# Patient Record
Sex: FEMALE | Race: WHITE | NOT HISPANIC OR LATINO | Employment: UNEMPLOYED | URBAN - METROPOLITAN AREA
[De-identification: names, ages, dates, MRNs, and addresses within clinical notes are randomized per-mention and may not be internally consistent; named-entity substitution may affect disease eponyms.]

---

## 2018-01-11 ENCOUNTER — OFFICE VISIT CONVERTED (OUTPATIENT)
Dept: SURGERY | Facility: CLINIC | Age: 53
End: 2018-01-11
Attending: SURGERY

## 2019-03-18 ENCOUNTER — OFFICE VISIT CONVERTED (OUTPATIENT)
Dept: PLASTIC SURGERY | Facility: CLINIC | Age: 54
End: 2019-03-18
Attending: PLASTIC SURGERY

## 2019-03-29 ENCOUNTER — CONVERSION ENCOUNTER (OUTPATIENT)
Dept: FAMILY MEDICINE CLINIC | Facility: CLINIC | Age: 54
End: 2019-03-29

## 2019-03-29 ENCOUNTER — OFFICE VISIT CONVERTED (OUTPATIENT)
Dept: FAMILY MEDICINE CLINIC | Facility: CLINIC | Age: 54
End: 2019-03-29
Attending: NURSE PRACTITIONER

## 2019-05-23 ENCOUNTER — PROCEDURE VISIT CONVERTED (OUTPATIENT)
Dept: PLASTIC SURGERY | Facility: CLINIC | Age: 54
End: 2019-05-23
Attending: PLASTIC SURGERY

## 2019-05-31 ENCOUNTER — OFFICE VISIT CONVERTED (OUTPATIENT)
Dept: PLASTIC SURGERY | Facility: CLINIC | Age: 54
End: 2019-05-31
Attending: PLASTIC SURGERY

## 2020-01-20 ENCOUNTER — OFFICE VISIT CONVERTED (OUTPATIENT)
Dept: FAMILY MEDICINE CLINIC | Facility: CLINIC | Age: 55
End: 2020-01-20
Attending: NURSE PRACTITIONER

## 2020-01-21 ENCOUNTER — HOSPITAL ENCOUNTER (OUTPATIENT)
Dept: FAMILY MEDICINE CLINIC | Facility: CLINIC | Age: 55
Discharge: HOME | End: 2020-01-21
Attending: NURSE PRACTITIONER

## 2020-01-21 LAB
ALBUMIN SERPL-MCNC: 4.4 G/DL (ref 3.5–5)
ALBUMIN/GLOB SERPL: 1.5 {RATIO} (ref 1.4–2.6)
ALP SERPL-CCNC: 50 U/L (ref 53–141)
ALT SERPL-CCNC: 11 U/L (ref 10–40)
ANION GAP SERPL CALC-SCNC: 15 MMOL/L (ref 8–19)
AST SERPL-CCNC: 18 U/L (ref 15–50)
BASOPHILS # BLD AUTO: 0.02 10*3/UL (ref 0–0.2)
BASOPHILS NFR BLD AUTO: 0.3 % (ref 0–3)
BILIRUB SERPL-MCNC: 0.48 MG/DL (ref 0.2–1.3)
BUN SERPL-MCNC: 14 MG/DL (ref 5–25)
BUN/CREAT SERPL: 26 {RATIO} (ref 6–20)
CALCIUM SERPL-MCNC: 9.7 MG/DL (ref 8.7–10.4)
CHLORIDE SERPL-SCNC: 100 MMOL/L (ref 99–111)
CHOLEST SERPL-MCNC: 170 MG/DL (ref 107–200)
CHOLEST/HDLC SERPL: 2.9 {RATIO} (ref 3–6)
CONV ABS IMM GRAN: 0.02 10*3/UL (ref 0–0.2)
CONV CO2: 26 MMOL/L (ref 22–32)
CONV IMMATURE GRAN: 0.3 % (ref 0–1.8)
CONV TOTAL PROTEIN: 7.4 G/DL (ref 6.3–8.2)
CREAT UR-MCNC: 0.53 MG/DL (ref 0.5–0.9)
DEPRECATED RDW RBC AUTO: 43.8 FL (ref 36.4–46.3)
EOSINOPHIL # BLD AUTO: 0.1 10*3/UL (ref 0–0.7)
EOSINOPHIL # BLD AUTO: 1.7 % (ref 0–7)
ERYTHROCYTE [DISTWIDTH] IN BLOOD BY AUTOMATED COUNT: 12.8 % (ref 11.7–14.4)
GFR SERPLBLD BASED ON 1.73 SQ M-ARVRAT: >60 ML/MIN/{1.73_M2}
GLOBULIN UR ELPH-MCNC: 3 G/DL (ref 2–3.5)
GLUCOSE SERPL-MCNC: 96 MG/DL (ref 65–99)
HCT VFR BLD AUTO: 41 % (ref 37–47)
HDLC SERPL-MCNC: 58 MG/DL (ref 40–60)
HGB BLD-MCNC: 13.3 G/DL (ref 12–16)
LDLC SERPL CALC-MCNC: 96 MG/DL (ref 70–100)
LYMPHOCYTES # BLD AUTO: 1.99 10*3/UL (ref 1–5)
LYMPHOCYTES NFR BLD AUTO: 33.4 % (ref 20–45)
MCH RBC QN AUTO: 30.2 PG (ref 27–31)
MCHC RBC AUTO-ENTMCNC: 32.4 G/DL (ref 33–37)
MCV RBC AUTO: 93 FL (ref 81–99)
MONOCYTES # BLD AUTO: 0.45 10*3/UL (ref 0.2–1.2)
MONOCYTES NFR BLD AUTO: 7.6 % (ref 3–10)
NEUTROPHILS # BLD AUTO: 3.38 10*3/UL (ref 2–8)
NEUTROPHILS NFR BLD AUTO: 56.7 % (ref 30–85)
NRBC CBCN: 0 % (ref 0–0.7)
OSMOLALITY SERPL CALC.SUM OF ELEC: 284 MOSM/KG (ref 273–304)
PLATELET # BLD AUTO: 308 10*3/UL (ref 130–400)
PMV BLD AUTO: 9.7 FL (ref 9.4–12.3)
POTASSIUM SERPL-SCNC: 4 MMOL/L (ref 3.5–5.3)
RBC # BLD AUTO: 4.41 10*6/UL (ref 4.2–5.4)
SODIUM SERPL-SCNC: 137 MMOL/L (ref 135–147)
TRIGL SERPL-MCNC: 79 MG/DL (ref 40–150)
TSH SERPL-ACNC: 0.68 M[IU]/L (ref 0.27–4.2)
VLDLC SERPL-MCNC: 16 MG/DL (ref 5–37)
WBC # BLD AUTO: 5.96 10*3/UL (ref 4.8–10.8)

## 2020-01-22 LAB
CONV HEPATITIS C AB WITH REFLEX TO CONFIRMATION: <0.1 S/CO RATIO (ref 0–0.9)
CONV HEPATITIS COMMENT: NORMAL

## 2020-01-24 ENCOUNTER — HOSPITAL ENCOUNTER (OUTPATIENT)
Dept: MRI IMAGING | Facility: HOSPITAL | Age: 55
Discharge: HOME | End: 2020-01-24
Attending: NURSE PRACTITIONER

## 2020-02-03 ENCOUNTER — OFFICE VISIT CONVERTED (OUTPATIENT)
Dept: NEUROSURGERY | Facility: CLINIC | Age: 55
End: 2020-02-03
Attending: PHYSICIAN ASSISTANT

## 2020-02-20 ENCOUNTER — HOSPITAL ENCOUNTER (OUTPATIENT)
Dept: MAMMOGRAPHY | Facility: HOSPITAL | Age: 55
Discharge: HOME | End: 2020-02-20
Attending: NURSE PRACTITIONER

## 2020-05-13 ENCOUNTER — OFFICE VISIT CONVERTED (OUTPATIENT)
Dept: FAMILY MEDICINE CLINIC | Facility: CLINIC | Age: 55
End: 2020-05-13
Attending: NURSE PRACTITIONER

## 2020-06-19 ENCOUNTER — OFFICE VISIT CONVERTED (OUTPATIENT)
Dept: FAMILY MEDICINE CLINIC | Facility: CLINIC | Age: 55
End: 2020-06-19
Attending: NURSE PRACTITIONER

## 2020-08-10 ENCOUNTER — HOSPITAL ENCOUNTER (OUTPATIENT)
Dept: URGENT CARE | Facility: CLINIC | Age: 55
Discharge: HOME | End: 2020-08-10
Attending: NURSE PRACTITIONER

## 2020-08-12 LAB — SARS-COV-2 RNA SPEC QL NAA+PROBE: DETECTED

## 2020-09-01 ENCOUNTER — HOSPITAL ENCOUNTER (OUTPATIENT)
Dept: URGENT CARE | Facility: CLINIC | Age: 55
Discharge: HOME | End: 2020-09-01
Attending: NURSE PRACTITIONER

## 2020-09-01 ENCOUNTER — HOSPITAL ENCOUNTER (OUTPATIENT)
Dept: FAMILY MEDICINE CLINIC | Facility: CLINIC | Age: 55
Discharge: HOME | End: 2020-09-01
Attending: NURSE PRACTITIONER

## 2020-09-01 ENCOUNTER — OFFICE VISIT CONVERTED (OUTPATIENT)
Dept: FAMILY MEDICINE CLINIC | Facility: CLINIC | Age: 55
End: 2020-09-01
Attending: NURSE PRACTITIONER

## 2020-09-03 LAB
BACTERIA SPEC AEROBE CULT: NORMAL
SARS-COV-2 RNA SPEC QL NAA+PROBE: NOT DETECTED

## 2020-09-16 ENCOUNTER — HOSPITAL ENCOUNTER (OUTPATIENT)
Dept: ULTRASOUND IMAGING | Facility: HOSPITAL | Age: 55
Discharge: HOME | End: 2020-09-16
Attending: NURSE PRACTITIONER

## 2020-09-25 ENCOUNTER — OFFICE VISIT CONVERTED (OUTPATIENT)
Dept: OTOLARYNGOLOGY | Facility: CLINIC | Age: 55
End: 2020-09-25
Attending: NURSE PRACTITIONER

## 2020-10-02 ENCOUNTER — OFFICE VISIT CONVERTED (OUTPATIENT)
Dept: OTOLARYNGOLOGY | Facility: CLINIC | Age: 55
End: 2020-10-02
Attending: NURSE PRACTITIONER

## 2021-02-02 ENCOUNTER — OFFICE VISIT CONVERTED (OUTPATIENT)
Dept: FAMILY MEDICINE CLINIC | Facility: CLINIC | Age: 56
End: 2021-02-02
Attending: NURSE PRACTITIONER

## 2021-02-04 ENCOUNTER — HOSPITAL ENCOUNTER (OUTPATIENT)
Dept: FAMILY MEDICINE CLINIC | Facility: CLINIC | Age: 56
Discharge: HOME | End: 2021-02-04
Attending: NURSE PRACTITIONER

## 2021-02-04 LAB
ALBUMIN SERPL-MCNC: 4.7 G/DL (ref 3.5–5)
ALBUMIN/GLOB SERPL: 1.6 {RATIO} (ref 1.4–2.6)
ALP SERPL-CCNC: 70 U/L (ref 53–141)
ALT SERPL-CCNC: 24 U/L (ref 10–40)
ANION GAP SERPL CALC-SCNC: 13 MMOL/L (ref 8–19)
AST SERPL-CCNC: 27 U/L (ref 15–50)
BASOPHILS # BLD AUTO: 0.02 10*3/UL (ref 0–0.2)
BASOPHILS NFR BLD AUTO: 0.4 % (ref 0–3)
BILIRUB SERPL-MCNC: 0.24 MG/DL (ref 0.2–1.3)
BUN SERPL-MCNC: 15 MG/DL (ref 5–25)
BUN/CREAT SERPL: 22 {RATIO} (ref 6–20)
CALCIUM SERPL-MCNC: 9.8 MG/DL (ref 8.7–10.4)
CHLORIDE SERPL-SCNC: 104 MMOL/L (ref 99–111)
CHOLEST SERPL-MCNC: 192 MG/DL (ref 107–200)
CHOLEST/HDLC SERPL: 3.9 {RATIO} (ref 3–6)
CONV ABS IMM GRAN: 0.01 10*3/UL (ref 0–0.2)
CONV CO2: 26 MMOL/L (ref 22–32)
CONV IMMATURE GRAN: 0.2 % (ref 0–1.8)
CONV TOTAL PROTEIN: 7.7 G/DL (ref 6.3–8.2)
CREAT UR-MCNC: 0.67 MG/DL (ref 0.5–0.9)
DEPRECATED RDW RBC AUTO: 40.9 FL (ref 36.4–46.3)
EOSINOPHIL # BLD AUTO: 0.17 10*3/UL (ref 0–0.7)
EOSINOPHIL # BLD AUTO: 3.3 % (ref 0–7)
ERYTHROCYTE [DISTWIDTH] IN BLOOD BY AUTOMATED COUNT: 12.5 % (ref 11.7–14.4)
GFR SERPLBLD BASED ON 1.73 SQ M-ARVRAT: >60 ML/MIN/{1.73_M2}
GLOBULIN UR ELPH-MCNC: 3 G/DL (ref 2–3.5)
GLUCOSE SERPL-MCNC: 79 MG/DL (ref 65–99)
HCT VFR BLD AUTO: 43.1 % (ref 37–47)
HDLC SERPL-MCNC: 49 MG/DL (ref 40–60)
HGB BLD-MCNC: 14.2 G/DL (ref 12–16)
LDLC SERPL CALC-MCNC: 102 MG/DL (ref 70–100)
LYMPHOCYTES # BLD AUTO: 2.01 10*3/UL (ref 1–5)
LYMPHOCYTES NFR BLD AUTO: 38.4 % (ref 20–45)
MCH RBC QN AUTO: 29.3 PG (ref 27–31)
MCHC RBC AUTO-ENTMCNC: 32.9 G/DL (ref 33–37)
MCV RBC AUTO: 88.9 FL (ref 81–99)
MONOCYTES # BLD AUTO: 0.41 10*3/UL (ref 0.2–1.2)
MONOCYTES NFR BLD AUTO: 7.8 % (ref 3–10)
NEUTROPHILS # BLD AUTO: 2.61 10*3/UL (ref 2–8)
NEUTROPHILS NFR BLD AUTO: 49.9 % (ref 30–85)
NRBC CBCN: 0 % (ref 0–0.7)
OSMOLALITY SERPL CALC.SUM OF ELEC: 288 MOSM/KG (ref 273–304)
PLATELET # BLD AUTO: 337 10*3/UL (ref 130–400)
PMV BLD AUTO: 9.8 FL (ref 9.4–12.3)
POTASSIUM SERPL-SCNC: 3.8 MMOL/L (ref 3.5–5.3)
RBC # BLD AUTO: 4.85 10*6/UL (ref 4.2–5.4)
SODIUM SERPL-SCNC: 139 MMOL/L (ref 135–147)
TRIGL SERPL-MCNC: 206 MG/DL (ref 40–150)
TSH SERPL-ACNC: 0.75 M[IU]/L (ref 0.27–4.2)
VLDLC SERPL-MCNC: 41 MG/DL (ref 5–37)
WBC # BLD AUTO: 5.23 10*3/UL (ref 4.8–10.8)

## 2021-03-03 ENCOUNTER — HOSPITAL ENCOUNTER (OUTPATIENT)
Dept: MAMMOGRAPHY | Facility: HOSPITAL | Age: 56
Discharge: HOME | End: 2021-03-03
Attending: NURSE PRACTITIONER

## 2021-05-10 NOTE — H&P
History and Physical      Patient Name: Leola Montana   Patient ID: [id number]   Sex: Female   YOB: 1965    Primary Care Provider: Lena EDWARD   Referring Provider: Lena EDWARD    Visit Date: September 25, 2020    Provider: CHEYANNE Subramanian   Location: [facility]   Location Address: [address] [place] [address], [address] [place] [address]  [id number]   Location Phone: [phone]          Chief Complaint     1.  Thyroid nodules    2.  Globus sensation       History Of Present Illness  Leola Montana is a 54 year old /White female who presents to the office today as a consult from Lena EDWARD.      She presents the clinic today for evaluation of thyroid nodules.  She reports that in August she was diagnosed with COVID.  She states that during her illness she developed a loss of sense of taste and smell as well as a globus sensation that would occur whenever she would drink liquid.  She reports that she has regained approximately 20% of her taste and smell but she still occasionally has a sensation in her throat when she drinks liquids that there is something there.  She denies any throat pain, dysphagia, changes to her voice or ear pain.  She is not a smoker and has had no unintended weight loss.  She recently had an ultrasound done of her thyroid.  This showed that the overall thyroid gland was of normal size.  Identified in the right lobe was a hypoechoic solid nodule without calcifications measuring 8 mm and a second nodule with similar features measuring 5 mm.  Within the left thyroid lobe there is a hypoechoic solid nodule without calcifications measuring 8 mm.  These nodules met criteria for Ti RADS 4 classification and a repeat ultrasound in 12 months was recommended.  She reports that she had a thyroid ultrasound approximately 20 years ago that revealed some small thyroid nodules but she has had no other imaging since that time.  She has  no history of thyroid dysfunction.  There is no family history of thyroid cancer and she has no personal history of radiation exposure.  She denies any compressive symptoms or overlying skin changes.  She does report having frequent tonsil stones.  She states that she had recurrent tonsillitis as a child but never had her tonsils removed.  She is not having any issues with recurrent tonsillitis or strep throat as an adult.  She did have a sore throat while she was sick with COVID but that has since resolved.  She denies any recent issues with acid reflux or heartburn.  She states that this used to be an issue prior to having her gallbladder removed 2 years ago since that time she does not have any symptoms of GERD.       Past Medical History  Arthritis; Cervical pain (neck); Facial aging; Headache; Menopausal symptoms; Neural foraminal stenosis of cervical spine; Thyroid nodule; Varicose veins of left lower extremity with pain         Past Surgical History  Cholecystecomy; Gallbladder         Medication List  ibuprofen 600 mg oral tablet         Allergy List  NO KNOWN DRUG ALLERGIES         Family Medical History  Family history of breast cancer; Family history of prostate cancer; Family history of lung cancer         Social History  Alcohol (Current some day); Caffeine (Current - status unknown); Exercises regularly; Homemaker; lives with spouse; ; Second hand smoke exposure (Never); Tobacco (Former)         Immunizations  Name Date Admin   Influenza          Review of Systems  · Constitutional  o Denies  o : fever, night sweats, weight loss  · Eyes  o Denies  o : discharge from eye, impaired vision  · HENT  o Admits  o : *See HPI  · Cardiovascular  o Denies  o : chest pain, irregular heart beats  · Respiratory  o Denies  o : shortness of breath, wheezing, coughing up blood  · Gastrointestinal  o Denies  o : heartburn, reflux, vomiting blood  · Genitourinary  o Denies  o :  "frequency  · Integument  o Denies  o : rash, skin dryness  · Neurologic  o Denies  o : seizures, loss of balance, loss of consciousness, dizziness  · Endocrine  o Denies  o : cold intolerance, heat intolerance  · Heme-Lymph  o Denies  o : easy bleeding, anemia      Vitals  Date Time BP Position Site L\R Cuff Size HR RR TEMP (F) WT  HT  BMI kg/m2 BSA m2 O2 Sat        09/25/2020 09:55 AM        97.4 140lbs 4oz 5'  5\" 23.34 1.71           Physical Examination  · Constitutional  o Appearance  o : well developed, well-nourished, alert and in no acute distress, voice clear and strong  · Head and Face  o Head  o :   § Inspection  § : no deformities or lesions  o Face  o :   § Inspection  § : No facial lesions; House-Brackmann I/VI bilaterally  § Palpation  § : No TMJ crepitus nor  muscle tenderness bilaterally  · Eyes  o Vision  o :   § Visual Fields  § : Extraocular movements are intact. No spontaneous or gaze-induced nystagmus.  o Conjunctivae  o : clear  o Sclerae  o : clear  o Pupils and Irises  o : pupils equal, round, and reactive to light.   · Ears, Nose, Mouth and Throat  o Ears  o :   § External Ears  § : appearance within normal limits, no lesions present  § Otoscopic Examination  § : tympanic membrane appearance within normal limits bilaterally without perforations, well-aerated middle ears  § Hearing  § : intact to conversational voice both ears  o Nose  o :   § External Nose  § : appearance normal  § Intranasal Exam  § : mucosa within normal limits, vestibules normal, no intranasal lesions present, septum midline, sinuses non tender to percussion  o Oral Cavity  o :   § Oral Mucosa  § : oral mucosa normal without pallor or cyanosis  § Lips  § : lip appearance normal  § Teeth  § : normal dentition for age  § Gums  § : gums pink, non-swollen, no bleeding present  § Tongue  § : tongue appearance normal; normal mobility  § Palate  § : hard palate normal, soft palate appearance normal with symmetric " mobility  o Throat  o :   § Oropharynx  § : no inflammation or lesions present, tonsils within normal limits  · Neck  o Inspection/Palpation  o : normal appearance, no masses or tenderness, trachea midline; thyroid size normal, nontender, no nodules or masses present on palpation, no overlying skin changes  · Respiratory  o Respiratory Effort  o : breathing unlabored  o Inspection of Chest  o : normal appearance, no retractions  · Lymphatic  o Neck  o : no lymphadenopathy present  o Supraclavicular Nodes  o : no lymphadenopathy present  o Preauricular Nodes  o : no lymphadenopathy present  · Skin and Subcutaneous Tissue  o General Inspection  o : Regarding face and neck - there are no rashes present, no lesions present, and no areas of discoloration  · Neurologic  o Cranial Nerves  o : cranial nerves II-XII are grossly intact bilaterally  o Gait and Station  o : normal gait, able to stand without diffculty  · Psychiatric  o Judgement and Insight  o : judgment and insight intact  o Mood and Affect  o : mood normal, affect appropriate          Assessment  · Thyroid nodule     241.0/E04.1  · Globus sensation     306.4/R09.89    Problems Reconciled  Plan  · Orders  o Ultrasound Thyroid. (64682) - 241.0/E04.1 - 09/25/2021  · Medications  o Neilmed Sinus Rinse Complete sinus irrigation packet with rinse device   SIG: Take 1 packet with rinse device by sinus irrigation route 2 times a day   DISP: (60) Packet with rinse devices with 2 refills  Prescribed on 09/25/2020     o fluticasone propionate 50 mcg/actuation nasal spray,suspension   SIG: spray 1 - 2 sprays (50 - 100 mcg) in each nostril by intranasal route once daily for 30 days   DISP: (1) 9.9 ml aer w/adap with 2 refills  Prescribed on 09/25/2020     o Medications have been Reconciled  o Transition of Care or Provider Policy  · Instructions  o She presents the clinic today for evaluation of thyroid nodules. She reports that in August she was diagnosed with COVID. She  states that during her illness she developed a loss of sense of taste and smell as well as a globus sensation that would occur whenever she would drink liquid. She reports that she has regained approximately 20% of her taste and smell but she still occasionally has a sensation in her throat when she drinks liquids that there is something there. She denies any throat pain, dysphagia, changes to her voice or ear pain. She is not a smoker and has had no unintended weight loss. She recently had an ultrasound done of her thyroid. This showed that the overall thyroid gland was of normal size. Identified in the right lobe was a hypoechoic solid nodule without calcifications measuring 8 mm and a second nodule with similar features measuring 5 mm. Within the left thyroid lobe there is a hypoechoic solid nodule without calcifications measuring 8 mm. These nodules met criteria for Ti RADS 4 classification and a repeat ultrasound in 12 months was recommended. She reports that she had a thyroid ultrasound approximately 20 years ago that revealed some small thyroid nodules but she has had no other imaging since that time. She has no history of thyroid dysfunction. There is no family history of thyroid cancer and she has no personal history of radiation exposure. She denies any compressive symptoms or overlying skin changes. She does report having frequent tonsil stones. She states that she had recurrent tonsillitis as a child but never had her tonsils removed. She is not having any issues with recurrent tonsillitis or strep throat as an adult. She did have a sore throat while she was sick with COVID but that has since resolved. She denies any recent issues with acid reflux or heartburn. She states that this used to be an issue prior to having her gallbladder removed 2 years ago since that time she does not have any symptoms of GERD. On examination today I do not see any tonsil stones present. Her tonsils are of normal size, 2+  and cryptic but not currently infected. Her thyroid is of normal size and I am not able to palpate any thyroid nodules. There is no cervical lymphadenopathy and no overlying skin changes. We discussed reasons for globus sensation including allergy related postnasal drainage and acid reflux symptoms. We have discussed performing a flexible nasal pharyngoscopy but at this time she defers. I will have her start doing baking soda and warm water gargles for tonsil stones and also using a nasal saline sinus rinse kit to help clean out her nose. She reports that she has previously used Flonase and would like a refill on this so I will have her use the Flonase after she does the nasal wash. In regards to her thyroid nodule we will schedule a repeat thyroid ultrasound in 12 months and I will plan to see her back after the scan.  o Electronically Identified Patient Education Materials Provided Electronically  · Correspondence  o ENT Letter to Referring MD (Lena EDWARD) - 09/25/2020            Electronically Signed by: CHEYANNE Subramanian -Author on September 25, 2020 10:42:23 AM

## 2021-05-12 ENCOUNTER — CONVERSION ENCOUNTER (OUTPATIENT)
Dept: FAMILY MEDICINE CLINIC | Facility: CLINIC | Age: 56
End: 2021-05-12

## 2021-05-12 ENCOUNTER — OFFICE VISIT CONVERTED (OUTPATIENT)
Dept: FAMILY MEDICINE CLINIC | Facility: CLINIC | Age: 56
End: 2021-05-12
Attending: NURSE PRACTITIONER

## 2021-05-13 NOTE — PROGRESS NOTES
Progress Note      Patient Name: Leola Montana   Patient ID: [id number]   Sex: Female   YOB: 1965    Primary Care Provider: Lena EDWARD   Referring Provider: Lena EDWARD    Visit Date: September 1, 2020    Provider: CHEYANNE Wray   Location: [facility]   Location Address: [address] [place] [address], [address] [place] [address]  [id number]   Location Phone: [phone]          Chief Complaint  · feels like something is in throat when she is drinking beverages      History Of Present Illness  Leola Montana is a 54 year old /White female who presents for evaluation and treatment of:      She states that she was seen at [facility] on 8/10/2020 due to a loss of taste and smell.  She was treated for sinus infection with Augmentin but she was also tested for COVID-19.  She states she was positive for COVID-19 and she was told to quarantine for 14 days which she did.  She states that the health department told her she did not need to quarantine anymore after that.  She is still complaining of loss of taste and smell.  She is also complaining now that she feels like something is stuck in her throat only when she drinks liquids and not when she eats.  She denies sore throat, denies fever or chills.       Past Medical History  Disease Name Date Onset Notes   Arthritis --  --    Cervical pain (neck) 01/20/2020 --    Facial aging --  --    Headache 01/20/2020 --    Menopausal symptoms 05/13/2020 --    Neural foraminal stenosis of cervical spine 01/20/2020 --    Varicose veins of left lower extremity with pain 06/19/2020 --          Past Surgical History  Procedure Name Date Notes   Cholecystecomy --  --    Gallbladder 2018 --          Medication List  Name Date Started Instructions   ibuprofen 600 mg oral tablet 05/13/2020 take 1 tablet (600 mg) by oral route every 6 to 8 hours as needed with food   Voltaren 1 % topical gel 05/13/2020 apply 4 grams to the  "affected area(s) by topical route 4 times per day for 90 days         Allergy List  Allergen Name Date Reaction Notes   NO KNOWN DRUG ALLERGIES --  --  --          Family Medical History  Disease Name Relative/Age Notes   Lung Neoplasm, Malignant  --    Prostate cancer Father/   Father   Family history of breast cancer  Aunt         Social History  Finding Status Start/Stop Quantity Notes   Alcohol Current some day --/-- --  occasionally drinks  drinks rarely   Caffeine Current - status unknown --/-- --  drinks coffee; 1-2 times per day   Exercises regularly --  --/-- --  --    Homemaker --  --/-- --  --    lives with spouse --  --/-- --  --     --  --/-- --  --    Second hand smoke exposure Never --/-- --  no   Tobacco Former --/-- --  former smoker, smoked 5 years         Immunizations  NameDate Admin Mfg Trade Name Lot Number Route Inj VIS Given VIS Publication   [id number]/01/2019 [facility] [employer]  NE NE 01/20/2020    Comments: at [facility] per pt information         Review of Systems  · Constitutional  o Denies  o : fatigue, fever, chills, body aches, night sweats  · HENT  o Denies  o : headaches, sinus pain, nasal congestion, nasal discharge, sore throat, ear pain, ear fullness  · Cardiovascular  o Denies  o : lower extremity edema, claudication, chest pressure, palpitations  · Respiratory  o Denies  o : shortness of breath, wheezing, cough, hemoptysis, dyspnea on exertion  · Gastrointestinal  o Denies  o : nausea, vomiting, diarrhea, constipation, abdominal pain  · Integument  o Denies  o : rash, itching      Vitals  Date Time BP Position Site L\R Cuff Size HR RR TEMP (F) WT  HT  BMI kg/m2 BSA m2 O2 Sat        09/01/2020 09:12 AM 92/62 Sitting    69 - R  97.1 142lbs 4oz 5'  5\" 23.67 1.72 97 %          Physical Examination  · Constitutional  o Appearance  o : no acute distress, well-nourished  · Head and Face  o Head  o :   § Inspection  § : atraumatic, normocephalic  · Ears, Nose, Mouth and " Throat  o Ears  o :   § External Ears  § : normal  § Otoscopic Examination  § : tympanic membrane appearance within normal limits bilaterally  o Oral Cavity  o :   § Oral Mucosa  § : moist mucous membranes  o Throat  o :   § Oropharynx  § : mild oropharynx inflammation present-bilateral   · Neck  o Thyroid  o : mild thyromegaly present, nontender, no nodules or masses present on palpation, symmetric  · Respiratory  o Respiratory Effort  o : breathing comfortably, symmetric chest rise  o Auscultation of Lungs  o : clear to asculatation bilaterally, no wheezes, rales, or rhonchii  · Cardiovascular  o Heart  o :   § Auscultation of Heart  § : regular rate and rhythm, no murmurs, rubs, or gallops  o Peripheral Vascular System  o :   § Extremities  § : no edema  · Lymphatic  o Neck  o : no lymphadenopathy present  · Neurologic  o Mental Status Examination  o :   § Orientation  § : grossly oriented to person, place and time  o Gait and Station  o :   § Gait Screening  § : normal gait  · Psychiatric  o General  o : normal mood and affect          Results  · In-Office Procedures  o Lab procedure  § IOP - Rapid Strep (09019)   § Beta Strep Gp A Culture: Negative   § Internal Control Verified?: Yes       Assessment  · COVID-19 (History of 8/10/20)       COVID-19     519.8/U07.1  · Loss of taste     781.1/R43.2  · Loss of smell     781.1/R43.0  · Enlarged thyroid     240.9/E04.9  · Difficulty swallowing liquids     787.20/R13.10  · Throat symptom     784.99/R68.89    Problems Reconciled  Plan  · Orders  o ACO-39: Current medications updated and reviewed () - - 09/01/2020  o [facility] NCOV2 (send-out) (69570) - 519.8/U07.1, 781.1/R43.2, 781.1/R43.0 - 09/01/2020  o Thyroid Ultrasound. (71569) - 240.9/E04.9, 519.8/U07.1, 787.20/R13.10 - 09/01/2020   History of COVID19 on 8/10/20, pt is being sent for retesting, schedule a few weeks out  o Throat culture and sensitivity (62826) - 784.99/R68.89, 787.20/R13.10 -  09/01/2020  · Medications  o Medications have been Reconciled  o Transition of Care or Provider Policy  · Instructions  o Handouts were given to patient: COVID-19 care packet  o Rest. Increase Fluids.  o Patient was educated/instructed on their diagnosis, treatment and medications prior to discharge from the clinic today.  o Patient instructed to seek medical attention urgently for new or worsening symptoms.  o Call the office with any concerns or questions.  o Discussed Covid-19 precautions including, but not limited to, social distancing, avoid touching your face, and hand washing.   · Disposition  o Call or Return if symptoms worsen or persist.     Strep swab in office negative, will send for confirmation due to redness in her throat and symptoms.  We will call patient only get appointment set up for her to get COVID-19 testing today.    Discussed we will need to quarantine until a negative COVID-19 and symptoms have resolved.  COVID-19 care packet given to patient and discussed.  Patient instructed not to go anywhere except to seek medical care.  Instructed to seek medical care for any difficulty of breathing, unable to keep fluids down, or worsening of symptoms.             Electronically Signed by: CHEYANNE Wray -Author on September 1, 2020 09:54:33 AM

## 2021-05-13 NOTE — PROGRESS NOTES
Progress Note      Patient Name: Leola Montana   Patient ID: [id number]   Sex: Female   YOB: 1965    Primary Care Provider: Lena EDWARD   Referring Provider: Lena EDWARD    Visit Date: October 2, 2020    Provider: CHEYANNE Subramanian   Location: [facility]   Location Address: [address] [place] [address], [address]  [id number]   Location Phone: [phone]          Chief Complaint     1.  Globus sensation       History Of Present Illness  Leola Montana is a 54 year old /White female who presents to the office today for a follow-up visit.      She presents to the clinic today for follow-up regarding globus sensation.  She reports that she continues to have a globus sensation that she feels on the right side whenever she swallows liquids.  She states that this does not occur when swallowing solid foods.  She will occasionally feel it whenever she speaks.  She states that this has been occurring since August when she tested positive for COVID.  She states since having COVID that she still has not fully regained her sense of smell or taste.  She denies any dysphagia, choking, painful swallowing or changes in her voice or hoarseness.  She is not a smoker or heavy drinker.  Her weight has been stable and overall she feels well.  She is quite worried by the sensation in her throat and is requesting a flexible nasopharyngoscopy today.       Review of Systems  · Constitutional  o Denies  o : fever, night sweats, weight loss  · Eyes  o Denies  o : discharge from eye, impaired vision  · HENT  o Admits  o : *See HPI  · Cardiovascular  o Denies  o : chest pain, irregular heart beats  · Respiratory  o Denies  o : shortness of breath, wheezing, coughing up blood  · Gastrointestinal  o Denies  o : heartburn, reflux, vomiting blood  · Genitourinary  o Denies  o : frequency  · Integument  o Denies  o : rash, skin dryness  · Neurologic  o Denies  o : seizures,  "loss of balance, loss of consciousness, dizziness  · Endocrine  o Denies  o : cold intolerance, heat intolerance  · Heme-Lymph  o Denies  o : easy bleeding, anemia      Vitals  Date Time BP Position Site L\R Cuff Size HR RR TEMP (F) WT  HT  BMI kg/m2 BSA m2 O2 Sat FR L/min FiO2 HC       10/02/2020 09:41 AM        98.1 142lbs 8oz 5'  5\" 23.71 1.72             Physical Examination  · Constitutional  o Appearance  o : well developed, well-nourished, alert and in no acute distress, voice clear and strong  · Head and Face  o Head  o :   § Inspection  § : no deformities or lesions  o Face  o :   § Inspection  § : No facial lesions; House-Brackmann I/VI bilaterally  § Palpation  § : No TMJ crepitus nor  muscle tenderness bilaterally  · Eyes  o Vision  o :   § Visual Fields  § : Extraocular movements are intact. No spontaneous or gaze-induced nystagmus.  o Conjunctivae  o : clear  o Sclerae  o : clear  o Pupils and Irises  o : pupils equal, round, and reactive to light.   · Ears, Nose, Mouth and Throat  o Ears  o :   § External Ears  § : appearance within normal limits, no lesions present  § Otoscopic Examination  § : tympanic membrane appearance within normal limits bilaterally without perforations, well-aerated middle ears  § Hearing  § : intact to conversational voice both ears  o Nose  o :   § External Nose  § : appearance normal  § Intranasal Exam  § : mucosa within normal limits, vestibules normal, no intranasal lesions present, septum midline, sinuses non tender to percussion  o Oral Cavity  o :   § Oral Mucosa  § : oral mucosa normal without pallor or cyanosis  § Lips  § : lip appearance normal  § Teeth  § : normal dentition for age  § Gums  § : gums pink, non-swollen, no bleeding present  § Tongue  § : tongue appearance normal; normal mobility  § Palate  § : hard palate normal, soft palate appearance normal with symmetric mobility  o Throat  o :   § Oropharynx  § : no inflammation or lesions present, " tonsils within normal limits  § Hypopharynx  § : appearance within normal limits, superior epiglottis within normal limits  § Larynx  § : appearance within normal limits, vocal cords within normal limits, no lesions present  o Nasopharyngoscopy  o : The patients nares were anesthetized with Lidocaine with Afrin. After this was done, the flexible nasopharyngoscope was passed through the left side. The nose is free of any lesions, polyps or purulence. The base of tongue, vallecula, epiglottis, arytenoid cartilages are all normal appearing. The bilateral vocal folds are normal in appearance and abducted and abducted normally. The subglottis was widely patent. There is no lesions are erythema noted. Patient tolerated the procedure well.  · Neck  o Inspection/Palpation  o : normal appearance, no masses or tenderness, trachea midline; thyroid size normal, nontender, no nodules or masses present on palpation  · Respiratory  o Respiratory Effort  o : breathing unlabored  o Inspection of Chest  o : normal appearance, no retractions  · Lymphatic  o Neck  o : no lymphadenopathy present  o Supraclavicular Nodes  o : no lymphadenopathy present  o Preauricular Nodes  o : no lymphadenopathy present  · Skin and Subcutaneous Tissue  o General Inspection  o : Regarding face and neck - there are no rashes present, no lesions present, and no areas of discoloration  · Neurologic  o Cranial Nerves  o : cranial nerves II-XII are grossly intact bilaterally  o Gait and Station  o : normal gait, able to stand without diffculty  · Psychiatric  o Judgement and Insight  o : judgment and insight intact  o Mood and Affect  o : mood normal, affect appropriate          Assessment  · Globus pharyngeus     306.4/R09.89      Plan  · Orders  o Laryngoscopy (Flex) [facility] ([address]7429) - 306.4/R09.89 - 10/02/2020  · Medications  o famotidine 20 mg oral tablet   SIG: take 1 tablet (20 mg) by oral route 2 times per day for 45 days   DISP: (90) Tablet with 0  refills  Prescribed on 10/02/2020     o Medications have been Reconciled  o Transition of Care or Provider Policy  · Instructions  o She presents to the clinic today for follow-up regarding globus sensation. She reports that she continues to have a globus sensation that she feels on the right side whenever she swallows liquids. She states that this does not occur when swallowing solid foods. She will occasionally feel it whenever she speaks. She states that this has been occurring since August when she tested positive for COVID. She states since having COVID that she still has not fully regained her sense of smell or taste. She denies any dysphagia, choking, painful swallowing or changes in her voice or hoarseness. She is not a smoker or heavy drinker. Her weight has been stable and overall she feels well. She is quite worried by the sensation in her throat and is requesting a flexible nasopharyngoscopy today. On examination today I did perform flexible nasopharyngoscopy.The patients nares were anesthetized with Lidocaine with Afrin. After this was done, the flexible nasopharyngoscope was passed through the left side. The nose is free of any lesions, polyps or purulence. The base of tongue, vallecula, epiglottis, arytenoid cartilages are all normal appearing. The bilateral vocal folds are normal in appearance and abducted and abducted normally. The subglottis was widely patent. There is no lesions are erythema noted. Patient tolerated the procedure well. On palpation of her tonsils bilaterally they are soft and nontender to palpation. I explained to her that I do not see any abnormal findings today. We discussed that the globus sensation could be caused from silent reflux so we will treat her with a short course of an H2 blocker to see if this helps with her globus symptoms. She will follow-up if no improvement.  o Electronically Identified Patient Education Materials Provided  Electronically            Electronically Signed by: CHEYANNE Subramanian -Author on October 2, 2020 10:45:42 AM

## 2021-05-13 NOTE — PROGRESS NOTES
Progress Note      Patient Name: Leola Montana   Patient ID: [id number]   Sex: Female   YOB: 1965    Primary Care Provider: Lena EDWARD   Referring Provider: Lena EDWARD    Visit Date: May 13, 2020    Provider: CHEYANNE Wray   Location: [facility]   Location Address: [address] [place] [address], [address]  [id number]   Location Phone: [phone]          Chief Complaint  · Med refill      History Of Present Illness  Leola Montana is a 54 year old /White female who presents for evaluation and treatment of:      Follow-up and med refill.  She was given ibuprofen 600 mg every 6-8 hours as needed for her neck pain from her previous provider on [place] and she would like a refill.  She also is requesting a pain cream to try.    She is also complaining of menopausal symptoms, hot flashes and difficulty sleeping.  She is wanting to try a compound hormone replacement therapy because she states her sister uses this with good results.  She denies any personal or family history of breast or ovarian cancer.    She recently had her mammogram done on 3/2/2020 which was benign.    Her last Pap smear was 2/2019, within normal limits       Past Medical History  Arthritis; Cervical pain (neck); Facial aging; Headache; Menopausal symptoms; Neural foraminal stenosis of cervical spine         Past Surgical History  Cholecystecomy; Gallbladder         Allergy List  NO KNOWN DRUG ALLERGIES         Family Medical History  Lung Neoplasm, Malignant; Prostate cancer; Family history of breast cancer         Social History  Alcohol (Current some day); Caffeine (Current - status unknown); Exercises regularly; Homemaker; lives with spouse; ; Second hand smoke exposure (Never); Tobacco (Former)         Immunizations  Name Date Admin   Influenza          Review of Systems  · Constitutional  o Denies  o : fever, fatigue, weight loss, weight  "gain  · Cardiovascular  o Denies  o : lower extremity edema, claudication, chest pressure, palpitations  · Respiratory  o Denies  o : shortness of breath, wheezing, cough, hemoptysis, dyspnea on exertion  · Gastrointestinal  o Denies  o : nausea, vomiting, diarrhea, constipation, abdominal pain  · Integument  o Denies  o : rash, itching  · Musculoskeletal  o Admits  o : neck pain  o Denies  o : joint pain, joint swelling  · Endocrine  o Admits  o : hot flashes  o Denies  o : polyuria, polydipsia  · Psychiatric  o Admits  o : difficulty sleeping  o Denies  o : anxiety, depression, suicidal ideation, homicidal ideation      Vitals  Date Time BP Position Site L\R Cuff Size HR RR TEMP (F) WT  HT  BMI kg/m2 BSA m2 O2 Sat HC       05/13/2020 11:12 /76 Sitting    62 - R  97.7 136lbs 6oz 5'  5\" 22.69 1.68 100 %          Physical Examination  · Constitutional  o Appearance  o : no acute distress, well-nourished  · Head and Face  o Head  o :   § Inspection  § : atraumatic, normocephalic  · Neck  o Thyroid  o : gland size normal, nontender, no nodules or masses present on palpation, symmetric  · Respiratory  o Respiratory Effort  o : breathing comfortably, symmetric chest rise  o Auscultation of Lungs  o : clear to asculatation bilaterally, no wheezes, rales, or rhonchii  · Cardiovascular  o Heart  o :   § Auscultation of Heart  § : regular rate and rhythm, no murmurs, rubs, or gallops  o Peripheral Vascular System  o :   § Extremities  § : no edema  · Lymphatic  o Neck  o : no lymphadenopathy present  · Skin and Subcutaneous Tissue  o General Inspection  o : no rashes present  · Neurologic  o Mental Status Examination  o :   § Orientation  § : grossly oriented to person, place and time  o Gait and Station  o :   § Gait Screening  § : normal gait  · Psychiatric  o General  o : normal mood and affect          Assessment  · Cervical pain (neck)     723.1/M54.2  · Menopausal symptoms     627.2/N95.1    Problems " Reconciled  Plan  · Orders  o ACO-39: Current medications updated and reviewed () - - 05/13/2020  o ACO-14: Influenza immunization administered or previously received () - - 05/13/2020  o OB/GYN CONSULTATION (OBGYN) - 627.2/N95.1 - 05/13/2020   pt is wanting compound HRT, request female provider. Last PAP 2/2019 WNL  · Medications  o Voltaren 1 % topical gel   SIG: apply 4 grams to the affected area(s) by topical route 4 times per day for 90 days   DISP: (1) 100 gm tube with 1 refills  Prescribed on 05/13/2020     o ibuprofen 600 mg oral tablet   SIG: take 1 tablet (600 mg) by oral route every 6 to 8 hours as needed with food   DISP: (270) tablet with 1 refills  Adjusted on 05/13/2020     o Medications have been Reconciled  o Transition of Care or Provider Policy  · Instructions  o Patient was educated/instructed on their diagnosis, treatment and medications prior to discharge from the clinic today.  o Patient instructed to seek medical attention urgently for new or worsening symptoms.  o Call the office with any concerns or questions.  o Discussed Covid-19 precautions including, but not limited to, social distancing, avoid touching your face, and hand washing.   · Disposition  o Return to clinic in 6 months            Electronically Signed by: CHEYANNE Wray -Author on May 13, 2020 12:23:29 PM

## 2021-05-13 NOTE — PROGRESS NOTES
Progress Note      Patient Name: Leola Montana   Patient ID: [id number]   Sex: Female   YOB: 1965    Primary Care Provider: Lena EDWARD   Referring Provider: Lena EDWARD    Visit Date: June 19, 2020    Provider: CHEYANNE Wray   Location: [facility]   Location Address: [address] [place] [address], [address]  [id number]   Location Phone: [phone]          Chief Complaint  · Discuss veins in leg      History Of Present Illness  Leola Montana is a 54 year old /White female who presents for evaluation and treatment of:      She is complaining of large veins in her left leg that run up her lower leg to her thigh.  She states she has had them for a long time but they have recently gotten larger and are painful.       Past Medical History  Arthritis; Cervical pain (neck); Facial aging; Headache; Menopausal symptoms; Neural foraminal stenosis of cervical spine; Varicose veins of left lower extremity with pain         Past Surgical History  Cholecystecomy; Gallbladder         Medication List  ibuprofen 600 mg oral tablet; Voltaren 1 % topical gel         Allergy List  NO KNOWN DRUG ALLERGIES         Family Medical History  Lung Neoplasm, Malignant; Prostate cancer; Family history of breast cancer         Social History  Alcohol (Current some day); Caffeine (Current - status unknown); Exercises regularly; Homemaker; lives with spouse; ; Second hand smoke exposure (Never); Tobacco (Former)         Immunizations  Name Date Admin   Influenza          Review of Systems  · Constitutional  o Denies  o : fatigue, fever, weight loss, weight gain  · Eyes  o Denies  o : eye discomfort, eye pain  · HENT  o Denies  o : vertigo, nasal congestion  · Cardiovascular  o Admits  o : varicosities  o Denies  o : chest pain, irregular heart beats, lower extremity edema  · Respiratory  o Denies  o : shortness of breath, cough  · Genitourinary  o Denies  o :  "frequency, dysuria  · Integument  o Denies  o : rash, itching  · Neurologic  o Denies  o : muscular weakness, memory difficulties  · Musculoskeletal  o Denies  o : joint swelling, muscle pain  · Psychiatric  o Denies  o : anxiety, depression  · Heme-Lymph  o Denies  o : lightheadedness, easy bleeding  · Allergic-Immunologic  o Denies  o : sinus allergy symptoms, allergic dermatitis      Vitals  Date Time BP Position Site L\R Cuff Size HR RR TEMP (F) WT  HT  BMI kg/m2 BSA m2 O2 Sat        06/19/2020 01:30 /60 Sitting    89 - R 14 97.5 134lbs 0oz 5'  5\" 22.3 1.67 95 %          Physical Examination  · Constitutional  o Appearance  o : no acute distress, well-nourished  · Head and Face  o Head  o :   § Inspection  § : atraumatic, normocephalic  · Neck  o Thyroid  o : gland size normal, nontender, no nodules or masses present on palpation, symmetric  · Respiratory  o Respiratory Effort  o : breathing comfortably, symmetric chest rise  o Auscultation of Lungs  o : clear to asculatation bilaterally, no wheezes, rales, or rhonchii  · Cardiovascular  o Heart  o :   § Auscultation of Heart  § : regular rate and rhythm, no murmurs, rubs, or gallops  o Peripheral Vascular System  o :   § Extremities  § : no edema  · Lymphatic  o Neck  o : no lymphadenopathy present  · Skin and Subcutaneous Tissue  o General Inspection  o : no rashes present  · Neurologic  o Mental Status Examination  o :   § Orientation  § : grossly oriented to person, place and time  o Gait and Station  o :   § Gait Screening  § : normal gait  · Vascular  o Comprehensive  o : Large varicose veins running from left lower leg up to left inner thigh  · Psychiatric  o General  o : normal mood and affect              Assessment  · Varicose veins of left lower extremity with pain     454.8/I83.812      Plan  · Orders  o ACO-39: Current medications updated and reviewed () - - 06/19/2020  o ACO-14: Influenza immunization administered or previously " received () - - 06/19/2020  o VASCULAR SURGERY CONSULTATION (VASCU) - 454.8/I83.812 - 06/19/2020  · Medications  o Medications have been Reconciled  o Transition of Care or Provider Policy  · Instructions  o Patient was educated/instructed on their diagnosis, treatment and medications prior to discharge from the clinic today.  o Patient instructed to seek medical attention urgently for new or worsening symptoms.  o Call the office with any concerns or questions.  · Disposition  o Call or Return if symptoms worsen or persist.     Due to varicose veins being painful will refer her to vascular surgeon.    We discussed that she should wear compression hose but she admits that would not be practical for her.             Electronically Signed by: CHEYANNE Wray -Author on June 19, 2020 01:45:27 PM

## 2021-05-14 VITALS — BODY MASS INDEX: 23.37 KG/M2 | HEIGHT: 65 IN | WEIGHT: 140.25 LBS | TEMPERATURE: 97.4 F

## 2021-05-14 VITALS
DIASTOLIC BLOOD PRESSURE: 68 MMHG | HEART RATE: 72 BPM | TEMPERATURE: 97.3 F | HEIGHT: 65 IN | OXYGEN SATURATION: 100 % | WEIGHT: 145.12 LBS | SYSTOLIC BLOOD PRESSURE: 106 MMHG | BODY MASS INDEX: 24.18 KG/M2

## 2021-05-14 VITALS — WEIGHT: 142.5 LBS | BODY MASS INDEX: 23.74 KG/M2 | HEIGHT: 65 IN | TEMPERATURE: 98.1 F

## 2021-05-14 VITALS
BODY MASS INDEX: 23.7 KG/M2 | WEIGHT: 142.25 LBS | HEIGHT: 65 IN | OXYGEN SATURATION: 97 % | HEART RATE: 69 BPM | DIASTOLIC BLOOD PRESSURE: 62 MMHG | TEMPERATURE: 97.1 F | SYSTOLIC BLOOD PRESSURE: 92 MMHG

## 2021-05-14 NOTE — PROGRESS NOTES
Progress Note      Patient Name: Leola Montana   Patient ID: [id number]   Sex: Female   YOB: 1965    Primary Care Provider: Lena EDWARD   Referring Provider: Lena EDWARD    Visit Date: February 2, 2021    Provider: CHEYANNE Wray   Location: [facility]   Location Address: [address] [place] [address], [address] [place] [address]  [id number]   Location Phone: [phone]          Chief Complaint  · follow up  · needs mammogram  · pain in right hip      History Of Present Illness  Leola Montana is a 55 year old /White female who presents for evaluation and treatment of:      She is here for an annual physical and lab work-up.  She also is requesting referral/order for mammogram.    She complains of right hip pain.  She states she has a lipoma on her right hip that is causing pain.  She states she had a lipoma on her neck/spine in the past about 10 years ago and had to have it removed due to the pain.  She does not want to have surgery right now and does not want to have x-rays at this time.    Her last Pap smear was in 2019.  She wants to know when she is should have another Pap smear.  I advised her if her Pap smears have been normal that she can do a Pap smear only every 2 to 3 years.       Past Medical History  Disease Name Date Onset Notes   Arthritis --  --    Cervical pain (neck) 01/20/2020 --    Facial aging --  --    Globus pharyngeus --  --    Headache 01/20/2020 --    Menopausal symptoms 05/13/2020 --    Neural foraminal stenosis of cervical spine 01/20/2020 --    Thyroid nodule --  --    Varicose veins of left lower extremity with pain 06/19/2020 --          Past Surgical History  Procedure Name Date Notes   Cholecystecomy --  --    Gallbladder 2018 --          Medication List  Name Date Started Instructions   famotidine 20 mg oral tablet 10/02/2020 take 1 tablet (20 mg) by oral route 2 times per day for 45 days   fluticasone propionate  50 mcg/actuation nasal spray,suspension 09/25/2020 spray 1 - 2 sprays (50 - 100 mcg) in each nostril by intranasal route once daily for 30 days   ibuprofen 600 mg oral tablet 05/13/2020 take 1 tablet (600 mg) by oral route every 6 to 8 hours as needed with food   Neilmed Sinus Rinse Complete sinus irrigation packet with rinse device 09/25/2020 Take 1 packet with rinse device by sinus irrigation route 2 times a day         Allergy List  Allergen Name Date Reaction Notes   NO KNOWN DRUG ALLERGIES --  --  --          Family Medical History  Disease Name Relative/Age Notes   Family history of breast cancer  Aunt   Family history of prostate cancer Father/   --    Family history of lung cancer Father/   --          Social History  Finding Status Start/Stop Quantity Notes   Alcohol Current some day --/-- --  occasionally drinks  drinks rarely   Caffeine Current - status unknown --/-- --  drinks coffee; 1-2 times per day   Exercises regularly --  --/-- --  --    Homemaker --  --/-- --  --    lives with spouse --  --/-- --  --     --  --/-- --  --    Second hand smoke exposure Never --/-- --  no   Tobacco Former --/-- --  former smoker, smoked 5 years, quit 30 years ago         Immunizations  NameDate Admin Mfg Trade Name Lot Number Route Inj VIS Given VIS Publication   Oangecpew06/01/2020 PMC [employer]  NE NE 02/02/2021    Comments:          Review of Systems  · Constitutional  o Denies  o : fever, fatigue, weight loss, weight gain  · Cardiovascular  o Denies  o : lower extremity edema, claudication, chest pressure, palpitations  · Respiratory  o Denies  o : shortness of breath, wheezing, cough, hemoptysis, dyspnea on exertion  · Gastrointestinal  o Denies  o : nausea, vomiting, diarrhea, constipation, abdominal pain  · Genitourinary  o Denies  o : urgency, frequency  · Integument  o Denies  o : rash, itching  · Musculoskeletal  o Admits  o : hip pain  o Denies  o : limitation of  "motion  · Psychiatric  o Denies  o : anxiety, depression, suicidal ideation, homicidal ideation      Vitals  Date Time BP Position Site L\R Cuff Size HR RR TEMP (F) WT  HT  BMI kg/m2 BSA m2 O2 Sat FR L/min FiO2 HC       02/02/2021 11:11 /68 Sitting    72 - R  97.3 145lbs 2oz 5'  5\" 24.15 1.74 100 %            Physical Examination  · Constitutional  o Appearance  o : no acute distress, well-nourished  · Head and Face  o Head  o :   § Inspection  § : atraumatic, normocephalic  · Neck  o Thyroid  o : gland size normal, nontender, no nodules or masses present on palpation, symmetric  · Respiratory  o Respiratory Effort  o : breathing comfortably, symmetric chest rise  o Auscultation of Lungs  o : clear to asculatation bilaterally, no wheezes, rales, or rhonchii  · Cardiovascular  o Heart  o :   § Auscultation of Heart  § : regular rate and rhythm, no murmurs, rubs, or gallops  o Peripheral Vascular System  o :   § Extremities  § : no edema  · Lymphatic  o Neck  o : no lymphadenopathy present  · Skin and Subcutaneous Tissue  o General Inspection  o : Lipoma noted to right side/upper hip area.  · Neurologic  o Mental Status Examination  o :   § Orientation  § : grossly oriented to person, place and time  o Gait and Station  o :   § Gait Screening  § : normal gait  · Psychiatric  o General  o : normal mood and affect          Assessment  · Visit for screening mammogram     V76.12/Z12.31  · Annual physical exam     V70.0/Z00.00  · Right hip pain     719.45/M25.551  · Lipoma, right hip/side     214.9/D17.9  Recommended that she try taking some Tylenol or ibuprofen as needed. She will notify me if pain becomes worse.      Plan  · Orders  o Screening Mammography; Bilateral 3D (55882, 55111, ) - V76.12/Z12.31 - 02/02/2021  o Physical, Primary Care Panel (CBC, CMP, Lipid, TSH) [facility] (57753, 35808, 46329, 59053) - V70.0/Z00.00 - 02/02/2021  o ACO-18: Negative screen for clinical depression using a standardized tool " () - - 02/02/2021   0 pts.  o ACO-14: Influenza immunization administered or previously received [facility] () - - 02/02/2021 09/2020  o ACO-19: Colorectal cancer screening results documented and reviewed (3017F) - - 02/02/2021 04/27/2017- repeat in 10 years  o ACO-39: Current medications updated and reviewed (1159F, ) - - 02/02/2021  · Medications  o Medications have been Reconciled  o Transition of Care or Provider Policy  · Instructions  o Reviewed health maintenance flowsheet and updated information. Orders were placed and/or patient's response was documented.  o Patient was educated/instructed on their diagnosis, treatment and medications prior to discharge from the clinic today.  o Patient instructed to seek medical attention urgently for new or worsening symptoms.  o Call the office with any concerns or questions.  · Disposition  o Annual wellness exam in one year  o Schedule Pap smear            Electronically Signed by: CHEYANNE Wray -Author on February 2, 2021 12:45:56 PM

## 2021-05-15 VITALS
SYSTOLIC BLOOD PRESSURE: 100 MMHG | RESPIRATION RATE: 14 BRPM | HEIGHT: 65 IN | WEIGHT: 134 LBS | TEMPERATURE: 97.5 F | BODY MASS INDEX: 22.33 KG/M2 | DIASTOLIC BLOOD PRESSURE: 60 MMHG | HEART RATE: 89 BPM | OXYGEN SATURATION: 95 %

## 2021-05-15 VITALS
HEIGHT: 65 IN | OXYGEN SATURATION: 100 % | TEMPERATURE: 98.1 F | WEIGHT: 136.25 LBS | SYSTOLIC BLOOD PRESSURE: 114 MMHG | BODY MASS INDEX: 22.7 KG/M2 | HEART RATE: 65 BPM | DIASTOLIC BLOOD PRESSURE: 76 MMHG

## 2021-05-15 VITALS
HEIGHT: 65 IN | WEIGHT: 136.37 LBS | HEART RATE: 62 BPM | BODY MASS INDEX: 22.72 KG/M2 | SYSTOLIC BLOOD PRESSURE: 116 MMHG | DIASTOLIC BLOOD PRESSURE: 76 MMHG | OXYGEN SATURATION: 100 % | TEMPERATURE: 97.7 F

## 2021-05-15 VITALS
OXYGEN SATURATION: 98 % | WEIGHT: 130 LBS | SYSTOLIC BLOOD PRESSURE: 114 MMHG | HEART RATE: 63 BPM | DIASTOLIC BLOOD PRESSURE: 75 MMHG | HEIGHT: 65 IN | BODY MASS INDEX: 21.66 KG/M2

## 2021-05-15 VITALS
BODY MASS INDEX: 21.68 KG/M2 | SYSTOLIC BLOOD PRESSURE: 110 MMHG | TEMPERATURE: 98 F | WEIGHT: 130.12 LBS | OXYGEN SATURATION: 99 % | HEART RATE: 84 BPM | HEIGHT: 65 IN | DIASTOLIC BLOOD PRESSURE: 54 MMHG

## 2021-05-15 VITALS — HEART RATE: 77 BPM | BODY MASS INDEX: 23.16 KG/M2 | WEIGHT: 139 LBS | HEIGHT: 65 IN

## 2021-05-16 VITALS — RESPIRATION RATE: 14 BRPM | BODY MASS INDEX: 23.32 KG/M2 | WEIGHT: 140 LBS | HEIGHT: 65 IN

## 2021-05-22 ENCOUNTER — TRANSCRIBE ORDERS (OUTPATIENT)
Dept: ADMINISTRATIVE | Facility: HOSPITAL | Age: 56
End: 2021-05-22

## 2021-05-22 DIAGNOSIS — E04.1 THYROID NODULE: Primary | ICD-10-CM

## 2021-06-14 ENCOUNTER — OFFICE VISIT (OUTPATIENT)
Dept: SURGERY | Facility: CLINIC | Age: 56
End: 2021-06-14

## 2021-06-14 VITALS — BODY MASS INDEX: 23.59 KG/M2 | WEIGHT: 141.6 LBS | HEIGHT: 65 IN

## 2021-06-14 DIAGNOSIS — D17.23 LIPOMA OF RIGHT LOWER EXTREMITY: Primary | ICD-10-CM

## 2021-06-14 PROBLEM — M19.90 ARTHRITIS: Status: ACTIVE | Noted: 2021-06-14

## 2021-06-14 PROBLEM — R23.8 FACIAL AGING: Status: ACTIVE | Noted: 2021-06-14

## 2021-06-14 PROBLEM — F45.8 GASTROINTESTINAL MALFUNCTION ARISING FROM MENTAL FACTORS: Status: ACTIVE | Noted: 2021-06-14

## 2021-06-14 PROBLEM — M25.551 RIGHT HIP PAIN: Status: ACTIVE | Noted: 2021-02-02

## 2021-06-14 PROBLEM — M48.02 NEURAL FORAMINAL STENOSIS OF CERVICAL SPINE: Status: ACTIVE | Noted: 2020-01-20

## 2021-06-14 PROBLEM — N95.1 MENOPAUSAL SYMPTOMS: Status: ACTIVE | Noted: 2020-05-13

## 2021-06-14 PROBLEM — I83.812 VARICOSE VEINS OF LEFT LOWER EXTREMITY WITH PAIN: Status: ACTIVE | Noted: 2020-06-19

## 2021-06-14 PROBLEM — R51.9 HEADACHE: Status: ACTIVE | Noted: 2020-01-20

## 2021-06-14 PROBLEM — E04.1 THYROID NODULE: Status: ACTIVE | Noted: 2021-06-14

## 2021-06-14 PROBLEM — D17.9 LIPOMA: Status: ACTIVE | Noted: 2021-02-02

## 2021-06-14 PROCEDURE — 99204 OFFICE O/P NEW MOD 45 MIN: CPT | Performed by: SURGERY

## 2021-06-14 NOTE — PROGRESS NOTES
"Chief Complaint: Skin Lesion (Right abdomen)    Subjective         History of Present Illness  Leola Montana is a 55 y.o. female presents to [facility] to be seen for a right hip lipoma.  It has been present for over 5 years and has gotten slightly larger in size.  She is having some pain with this and would like to have it removed..    Objective     Past Medical History:   Diagnosis Date   • Arthritis    • Cervical pain (neck) 01/20/2020   • Facial aging    • Globus pharyngeus    • Headache    • Lipoma of right upper extremity 02/02/2021    hip/side   • Menopausal symptom 05/13/2020   • Neural foraminal stenosis of cervical spine 01/20/2020   • Right hip pain 02/02/2021   • Thyroid nodule    • Varicose veins of lower extremity with pain, left 06/19/2020       Past Surgical History:   Procedure Laterality Date   • CHOLECYSTECTOMY     • GALLBLADDER SURGERY  2018   • LIPOMA EXCISION      neck 10 yrs ago       No current outpatient medications on file.    No Known Allergies     Family History   Problem Relation Age of Onset   • Prostate cancer Father    • Lung cancer Father    • Breast cancer Other        Social History     Socioeconomic History   • Marital status:      Spouse name: Not on file   • Number of children: Not on file   • Years of education: Not on file   • Highest education level: Not on file   Tobacco Use   • Smoking status: Former Smoker   • Tobacco comment: smoked 5 years, quite 30 years ago   Substance and Sexual Activity   • Alcohol use: Yes     Comment: occasionally drinks        Vital Signs:   Ht 165.1 cm (65\")   Wt 64.2 kg (141 lb 9.6 oz)   BMI 23.56 kg/m²    Review of Systems   Constitutional: Negative for fatigue and fever.   HENT: Negative for ear pain and sore throat.    Eyes: Negative for blurred vision.   Respiratory: Negative for cough and shortness of breath.    Cardiovascular: Negative for chest pain and leg swelling.   Gastrointestinal: Negative " for abdominal pain, constipation, diarrhea, nausea and vomiting.   Musculoskeletal: Negative for arthralgias and myalgias.   Skin: Positive for skin lesions. Negative for rash.   Neurological: Negative for weakness and headache.   Hematological: Negative for adenopathy. Does not bruise/bleed easily.   Psychiatric/Behavioral: Negative for sleep disturbance and depressed mood.       Physical Exam  Vitals and nursing note reviewed.   Constitutional:       General: She is not in acute distress.     Appearance: Normal appearance. She is well-developed.   HENT:      Head: Normocephalic and atraumatic.   Eyes:      Extraocular Movements: Extraocular movements intact.      Pupils: Pupils are equal, round, and reactive to light.   Cardiovascular:      Pulses: Normal pulses.   Pulmonary:      Effort: Pulmonary effort is normal. No retractions.      Breath sounds: Normal air entry. No wheezing.   Abdominal:      General: Bowel sounds are normal. There is no distension.      Palpations: Abdomen is soft.      Tenderness: There is no abdominal tenderness.      Hernia: No hernia is present.   Musculoskeletal:         General: No swelling or deformity.      Cervical back: Neck supple.   Skin:     General: Skin is warm and dry.      Findings: No erythema.      Comments: 2 cm lipoma on right hip.   Neurological:      General: No focal deficit present.      Mental Status: She is alert and oriented to person, place, and time.      Motor: Motor function is intact.   Psychiatric:         Mood and Affect: Mood normal.         Thought Content: Thought content normal.          Result Review :          Assessment and Plan    Diagnoses and all orders for this visit:    1. Lipoma of right lower extremity (Primary)  Assessment & Plan:  We will plan for excision of right hip lipoma    Orders:  -     Case Request; Standing  -     Follow anesthesia standing orders.; Standing  -     Obtain informed consent; Standing  -     SCD (sequential  compression device)- to be placed on patient in Pre-op; Standing  -     Verify / Perform Chlorhexidine Skin Prep; Standing  -     Verify / Perform Chlorhexidine Skin Prep if Indicated (If Not Already Completed); Standing  -     Instructions on coughing, deep breathing, and incentive spirometry.; Standing  -     ceFAZolin (ANCEF) 2 g in sodium chloride 0.9 % 100 mL IVPB  -     Case Request      Follow Up   No follow-ups on file.  Patient was given instructions and counseling regarding her condition or for health maintenance advice. Please see specific information pulled into the AVS if appropriate.

## 2021-07-15 VITALS
TEMPERATURE: 97.2 F | WEIGHT: 141.12 LBS | SYSTOLIC BLOOD PRESSURE: 112 MMHG | HEIGHT: 65 IN | OXYGEN SATURATION: 100 % | HEART RATE: 60 BPM | DIASTOLIC BLOOD PRESSURE: 72 MMHG | BODY MASS INDEX: 23.51 KG/M2

## 2021-08-04 ENCOUNTER — TELEPHONE (OUTPATIENT)
Dept: SURGERY | Facility: CLINIC | Age: 56
End: 2021-08-04

## 2021-08-06 LAB — HM PAP SMEAR: NORMAL

## 2021-08-10 DIAGNOSIS — E04.1 THYROID NODULE: Primary | ICD-10-CM

## 2021-08-13 NOTE — PROGRESS NOTES
"Chief Complaint: Follow-up    Subjective         History of Present Illness  Leola Montana is a 55 y.o. female presents to [facility] to be seen for a right hip lipoma.  It has been present for over 5 years and has gotten minimally larger in size.  It causes her no pain but she was concerned.  Discussed imaging options versus excision versus watching this and she is chosen to watch that she does not want to have surgery or any imaging done at this time.    Objective     Past Medical History:   Diagnosis Date   • Arthritis    • Cervical pain (neck) 01/20/2020   • Facial aging    • Globus pharyngeus    • Headache    • Lipoma of right upper extremity 02/02/2021    hip/side   • Menopausal symptom 05/13/2020   • Neural foraminal stenosis of cervical spine 01/20/2020   • Right hip pain 02/02/2021   • Thyroid nodule    • Varicose veins of lower extremity with pain, left 06/19/2020       Past Surgical History:   Procedure Laterality Date   • CHOLECYSTECTOMY     • GALLBLADDER SURGERY  2018   • LIPOMA EXCISION      neck 10 yrs ago       No current outpatient medications on file.    No Known Allergies     Family History   Problem Relation Age of Onset   • Prostate cancer Father    • Lung cancer Father    • Breast cancer Other        Social History     Socioeconomic History   • Marital status:      Spouse name: Not on file   • Number of children: Not on file   • Years of education: Not on file   • Highest education level: Not on file   Tobacco Use   • Smoking status: Former Smoker   • Smokeless tobacco: Never Used   • Tobacco comment: smoked 5 years, quite 30 years ago   Vaping Use   • Vaping Use: Never used   Substance and Sexual Activity   • Alcohol use: Yes     Comment: occasionally drinks        Vital Signs:   Resp 15   Ht 165.1 cm (65\")   Wt 62.1 kg (137 lb)   BMI 22.80 kg/m²    Review of Systems   Constitutional: Negative for fatigue and fever.   HENT: Negative for ear pain and " sore throat.    Eyes: Negative for blurred vision.   Respiratory: Negative for cough and shortness of breath.    Cardiovascular: Negative for chest pain and leg swelling.   Gastrointestinal: Negative for abdominal pain, constipation, diarrhea, nausea and vomiting.   Musculoskeletal: Negative for arthralgias and myalgias.   Skin: Positive for skin lesions. Negative for rash.   Neurological: Negative for weakness and headache.   Hematological: Negative for adenopathy. Does not bruise/bleed easily.   Psychiatric/Behavioral: Negative for sleep disturbance and depressed mood.       Physical Exam  Vitals and nursing note reviewed.   Constitutional:       General: She is not in acute distress.     Appearance: Normal appearance. She is well-developed.   HENT:      Head: Normocephalic and atraumatic.   Eyes:      Extraocular Movements: Extraocular movements intact.      Pupils: Pupils are equal, round, and reactive to light.   Cardiovascular:      Pulses: Normal pulses.   Pulmonary:      Effort: Pulmonary effort is normal. No retractions.      Breath sounds: Normal air entry. No wheezing.   Abdominal:      General: Bowel sounds are normal. There is no distension.      Palpations: Abdomen is soft.      Tenderness: There is no abdominal tenderness.      Hernia: No hernia is present.   Musculoskeletal:         General: No swelling or deformity.      Cervical back: Neck supple.   Skin:     General: Skin is warm and dry.      Findings: No erythema.      Comments: 2 cm lipoma on right hip.   Neurological:      General: No focal deficit present.      Mental Status: She is alert and oriented to person, place, and time.      Motor: Motor function is intact.   Psychiatric:         Mood and Affect: Mood normal.         Thought Content: Thought content normal.          Result Review :          Assessment and Plan    Diagnoses and all orders for this visit:    1. Lipoma of right lower extremity (Primary)    Follow-up as needed    Follow  Up   Return if symptoms worsen or fail to improve.  Patient was given instructions and counseling regarding her condition or for health maintenance advice. Please see specific information pulled into the AVS if appropriate.

## 2021-08-16 ENCOUNTER — OFFICE VISIT (OUTPATIENT)
Dept: SURGERY | Facility: CLINIC | Age: 56
End: 2021-08-16

## 2021-08-16 ENCOUNTER — HOSPITAL ENCOUNTER (EMERGENCY)
Facility: HOSPITAL | Age: 56
Discharge: HOME | End: 2021-08-17
Attending: EMERGENCY MEDICINE | Admitting: EMERGENCY MEDICINE

## 2021-08-16 ENCOUNTER — APPOINTMENT (OUTPATIENT)
Dept: GENERAL RADIOLOGY | Facility: HOSPITAL | Age: 56
End: 2021-08-16

## 2021-08-16 VITALS — BODY MASS INDEX: 22.82 KG/M2 | HEIGHT: 65 IN | WEIGHT: 137 LBS | RESPIRATION RATE: 15 BRPM

## 2021-08-16 DIAGNOSIS — R00.1 BRADYCARDIA: ICD-10-CM

## 2021-08-16 DIAGNOSIS — S82.031A CLOSED DISPLACED TRANSVERSE FRACTURE OF RIGHT PATELLA, INITIAL ENCOUNTER: Primary | ICD-10-CM

## 2021-08-16 DIAGNOSIS — D17.23 LIPOMA OF RIGHT LOWER EXTREMITY: Primary | ICD-10-CM

## 2021-08-16 PROCEDURE — 25010000002 HYDROMORPHONE 1 MG/ML SOLUTION: Performed by: EMERGENCY MEDICINE

## 2021-08-16 PROCEDURE — 93010 ELECTROCARDIOGRAM REPORT: CPT | Performed by: INTERNAL MEDICINE

## 2021-08-16 PROCEDURE — 73562 X-RAY EXAM OF KNEE 3: CPT

## 2021-08-16 PROCEDURE — 99284 EMERGENCY DEPT VISIT MOD MDM: CPT

## 2021-08-16 PROCEDURE — 93005 ELECTROCARDIOGRAM TRACING: CPT | Performed by: EMERGENCY MEDICINE

## 2021-08-16 PROCEDURE — 99212 OFFICE O/P EST SF 10 MIN: CPT | Performed by: SURGERY

## 2021-08-16 PROCEDURE — 96374 THER/PROPH/DIAG INJ IV PUSH: CPT

## 2021-08-16 PROCEDURE — 96372 THER/PROPH/DIAG INJ SC/IM: CPT

## 2021-08-16 RX ORDER — ONDANSETRON 4 MG/1
4 TABLET, ORALLY DISINTEGRATING ORAL EVERY 8 HOURS PRN
Qty: 9 TABLET | Refills: 0 | Status: SHIPPED | OUTPATIENT
Start: 2021-08-16 | End: 2021-08-31

## 2021-08-16 RX ORDER — NAPROXEN 500 MG/1
500 TABLET ORAL 2 TIMES DAILY WITH MEALS
Qty: 14 TABLET | Refills: 0 | Status: SHIPPED | OUTPATIENT
Start: 2021-08-16 | End: 2021-08-20 | Stop reason: HOSPADM

## 2021-08-16 RX ORDER — HYDROCODONE BITARTRATE AND ACETAMINOPHEN 5; 325 MG/1; MG/1
1 TABLET ORAL EVERY 6 HOURS PRN
Qty: 12 TABLET | Refills: 0 | Status: SHIPPED | OUTPATIENT
Start: 2021-08-16 | End: 2021-08-20 | Stop reason: HOSPADM

## 2021-08-16 RX ORDER — HYDROCODONE BITARTRATE AND ACETAMINOPHEN 7.5; 325 MG/1; MG/1
1 TABLET ORAL ONCE
Status: DISCONTINUED | OUTPATIENT
Start: 2021-08-16 | End: 2021-08-17 | Stop reason: HOSPADM

## 2021-08-16 RX ORDER — ONDANSETRON 4 MG/1
4 TABLET, ORALLY DISINTEGRATING ORAL ONCE
Status: DISCONTINUED | OUTPATIENT
Start: 2021-08-16 | End: 2021-08-17 | Stop reason: HOSPADM

## 2021-08-16 RX ADMIN — SODIUM CHLORIDE 1000 ML: 9 INJECTION, SOLUTION INTRAVENOUS at 23:46

## 2021-08-16 RX ADMIN — ATROPINE SULFATE 0.5 MG: 0.1 INJECTION, SOLUTION INTRAVENOUS at 23:48

## 2021-08-16 RX ADMIN — HYDROMORPHONE HYDROCHLORIDE 1 MG: 1 INJECTION, SOLUTION INTRAMUSCULAR; INTRAVENOUS; SUBCUTANEOUS at 23:10

## 2021-08-17 ENCOUNTER — TELEPHONE (OUTPATIENT)
Dept: FAMILY MEDICINE CLINIC | Facility: CLINIC | Age: 56
End: 2021-08-17

## 2021-08-17 ENCOUNTER — TELEPHONE (OUTPATIENT)
Dept: ORTHOPEDIC SURGERY | Facility: CLINIC | Age: 56
End: 2021-08-17

## 2021-08-17 VITALS
SYSTOLIC BLOOD PRESSURE: 102 MMHG | BODY MASS INDEX: 23.16 KG/M2 | HEIGHT: 65 IN | DIASTOLIC BLOOD PRESSURE: 49 MMHG | TEMPERATURE: 98.9 F | RESPIRATION RATE: 18 BRPM | WEIGHT: 139 LBS | HEART RATE: 90 BPM | OXYGEN SATURATION: 100 %

## 2021-08-17 DIAGNOSIS — S82.001A CLOSED DISPLACED FRACTURE OF RIGHT PATELLA, UNSPECIFIED FRACTURE MORPHOLOGY, INITIAL ENCOUNTER: Primary | ICD-10-CM

## 2021-08-17 LAB — QT INTERVAL: 458 MS

## 2021-08-17 PROCEDURE — 25010000003 ATROPINE SULFATE: Performed by: EMERGENCY MEDICINE

## 2021-08-17 NOTE — TELEPHONE ENCOUNTER
Spoke with patient and Karissa. Karissa already worked the referral and sent it off for scheduling. Patient will be called with appt date and time once scheduled.

## 2021-08-17 NOTE — ED PROVIDER NOTES
Time: 9:09 PM EDT  Arrived by: ambulance  Chief Complaint:   Chief Complaint   Patient presents with   • Fall     History provided by: pt and EMS  History is limited by: N/A     History of Present Illness:  Patient is a 55 y.o. year old female that presents to the emergency department with FALL.    Pt states she slipped tonight while standing and landed on her right knee around 730 pm. Pt denies LOC or hitting her head.      History provided by:  Patient and EMS personnel   used: No    Fall  Mechanism of injury: fall    Injury location:  Leg  Leg injury location:  R knee  Incident location:  Outdoors  Time since incident:  2 hours  Arrived directly from scene: yes    Fall:     Fall occurred:  Standing    Height of fall:  Unknown    Impact surface:  [place]    Point of impact:  Knees    Entrapped after fall: no    Protective equipment: none    Suspicion of alcohol use: no    Suspicion of drug use: no    Tetanus status:  Unknown  Prior to arrival data:     Bystander interventions:  None    Patient ambulatory at scene: yes      Blood loss:  None    Responsiveness at scene:  Alert    Orientation at scene:  Person, place, situation and time    Loss of consciousness: no      Amnesic to event: no      Airway interventions:  None    Breathing interventions:  None    IV access status:  None    IO access:  None    Fluids administered:  None    Cardiac interventions:  None    Medications administered:  None    Immobilization:  RLE splint    Airway condition since incident:  Stable    Breathing condition since incident:  Stable    Circulation condition since incident:  Stable    Mental status condition since incident:  Stable    Disability condition since incident:  Stable  Associated symptoms: no abdominal pain, no back pain, no headaches, no nausea, no neck pain, no seizures and no vomiting        Similar Symptoms Previously: none  Recently seen: not recently seen in this ED    Patient Care Team  Primary  Care Provider: Lena Prakash APRN    Past Medical History:     No Known Allergies  Past Medical History:   Diagnosis Date   • Arthritis    • Cervical pain (neck) 01/20/2020   • Facial aging    • Globus pharyngeus    • Headache    • Lipoma of right upper extremity 02/02/2021    hip/side   • Menopausal symptom 05/13/2020   • Neural foraminal stenosis of cervical spine 01/20/2020   • Right hip pain 02/02/2021   • Thyroid nodule    • Varicose veins of lower extremity with pain, left 06/19/2020     Past Surgical History:   Procedure Laterality Date   • CHOLECYSTECTOMY     • GALLBLADDER SURGERY  2018   • LIPOMA EXCISION      neck 10 yrs ago     Family History   Problem Relation Age of Onset   • Prostate cancer Father    • Lung cancer Father    • Breast cancer Other        Home Medications:  Prior to Admission medications    Not on File        Social History:   Social History     Tobacco Use   • Smoking status: Former Smoker   • Smokeless tobacco: Never Used   • Tobacco comment: smoked 5 years, quite 30 years ago   Vaping Use   • Vaping Use: Never used   Substance Use Topics   • Alcohol use: Yes     Comment: occasionally drinks    • Drug use: Not on file     Recent travel: not applicable     Review of Systems:  Review of Systems   Constitutional: Negative for chills and fever.   HENT: Negative for congestion, ear pain, rhinorrhea, sore throat, tinnitus and trouble swallowing.    Eyes: Negative for photophobia and pain.   Respiratory: Negative for choking and shortness of breath.    Gastrointestinal: Negative for abdominal pain, diarrhea, nausea and vomiting.   Endocrine: Negative for polydipsia.   Genitourinary: Negative for difficulty urinating, dysuria and hematuria.   Musculoskeletal: Positive for joint swelling (right knee). Negative for back pain and neck pain.   Skin: Negative for rash.   Neurological: Negative for dizziness, seizures, speech difficulty, weakness, light-headedness, numbness and headaches.  "  Hematological: Negative for adenopathy.   Psychiatric/Behavioral: Negative for behavioral problems, confusion, self-injury and suicidal ideas.        Physical Exam:  /49   Pulse 90   Temp 98.9 °F (37.2 °C)   Resp 18   Ht 165.1 cm (65\")   Wt 63 kg (139 lb)   LMP 08/07/2021 (Approximate)   SpO2 100%   BMI 23.13 kg/m²     Physical Exam  Vitals and nursing note reviewed.   Constitutional:       General: She is awake.      Appearance: Normal appearance.   HENT:      Head: Normocephalic and atraumatic.      Right Ear: External ear normal.      Left Ear: External ear normal.      Nose: Nose normal.      Mouth/Throat:      Mouth: Mucous membranes are moist.      Pharynx: Oropharynx is clear.   Eyes:      General: Lids are normal.      Extraocular Movements: Extraocular movements intact.      Conjunctiva/sclera: Conjunctivae normal.      Pupils: Pupils are equal, round, and reactive to light.   Cardiovascular:      Rate and Rhythm: Normal rate and regular rhythm.      Pulses: Normal pulses.      Heart sounds: Normal heart sounds.   Pulmonary:      Effort: Pulmonary effort is normal.      Breath sounds: Normal breath sounds and air entry.   Abdominal:      Palpations: Abdomen is soft.   Musculoskeletal:         General: Normal range of motion.      Cervical back: Full passive range of motion without pain, normal range of motion and neck supple.      Right knee: Swelling present.   Skin:     General: Skin is warm and dry.   Neurological:      General: No focal deficit present.      Mental Status: She is alert and oriented to person, place, and time. Mental status is at baseline.      Cranial Nerves: Cranial nerves are intact.      Sensory: Sensation is intact.      Motor: Motor function is intact.      Coordination: Coordination is intact.   Psychiatric:         Attention and Perception: Attention and perception normal.         Mood and Affect: Mood and affect normal.         Speech: Speech normal.         " Behavior: Behavior normal. Behavior is cooperative.         Thought Content: Thought content normal.         Cognition and Memory: Cognition and memory normal.                Medications in the Emergency Department:  Medications   HYDROmorphone (DILAUDID) injection 1 mg (1 mg Intramuscular Given 8/16/21 2310)   atropine sulfate injection 0.5 mg (0.5 mg Intravenous Given 8/16/21 5649)   sodium chloride 0.9 % bolus 1,000 mL (0 mL Intravenous Stopped 8/17/21 0016)        Labs  Lab Results (last 24 hours)     ** No results found for the last 24 hours. **           Imaging:  XR Knee 3 View Right    Result Date: 8/16/2021  PROCEDURE: XR KNEE 3 VW RIGHT  COMPARISON: None  INDICATIONS: RIGHT KNEE INJURY FROM FALL  FINDINGS:  There is a fracture through the mid aspect of the patella with separation of the 2 large fragments 2 cm.  The inferior fragment is tilted toward the joint.  There is a large amount of soft tissue swelling.  No other fracture is identified.  CONCLUSION: Fracture through the mid patella with separation of the 2 patellar fragments by 2 cm.      AMANDO PADILLA MD       Electronically Signed and Approved By: AMANDO PADILLA MD on 8/16/2021 at 22:27               Procedures:  Procedures    Progress  ED Course as of Aug 17 0515   Mon Aug 16, 2021   0166 Spoke to Dr. Cuevas and discussed a long leg splint with a follow-up with him tomorrow to schedule surgery.    [RK]   2339 Per nursing staff, the pt is in bradycardia.      [RK]   2344 EKG: Sinus bradycardia at 40, nml p waves, nml QRS, nml QT, nml ST segments - rate changed from previous    [RK]   2355 Upon reevaluation, pt's heart rate and BP improved after administration of Atropine     [RK]      ED Course User Index  [RK] Keri Norton                            Medical Decision Making:  MDM  Number of Diagnoses or Management Options     Amount and/or Complexity of Data Reviewed  Tests in the radiology section of CPT®: reviewed  Tests in the medicine  section of CPT®: reviewed  Decide to obtain previous medical records or to obtain history from someone other than the patient: yes  Obtain history from someone other than the patient: yes    Risk of Complications, Morbidity, and/or Mortality  Presenting problems: moderate  Management options: high    Critical Care  Total time providing critical care: 30-74 minutes (30 CCT for cardio-vascular system failure and Atropine administration for severe bradycardia)  Time exclusive of separately billable procedures.         Fracture care with an episode after splinting of near syncope and bradycardia which responded well to atropine. Observed 1 hour without decline.     Final diagnoses:   Closed displaced transverse fracture of right patella, initial encounter   Bradycardia        Disposition:  ED Disposition     ED Disposition Condition Comment    Discharge Stable           Documentation assistance provided by Keri Norton acting as scribe for Scot Means DO. Information recorded by the scribe was done at my direction and has been verified and validated by me.          Keri Norton  08/16/21 1684       Keri Norton  08/16/21 2811       Scot Means DO  08/17/21 0578

## 2021-08-17 NOTE — TELEPHONE ENCOUNTER
Patient's  came in the request a referral to ortho for his wife. She went to the [facility] ER for a broken ankle. States they were told she needs a referral from her PCP. Can her records just be looked at or does patient need to schedule an appt with you to get the referral? Please advise.

## 2021-08-17 NOTE — TELEPHONE ENCOUNTER
The records show that she fractured her right knee not her ankle.  She does not need to come in to see me, reviewed the ER records and I will refer her to Ortho. I put it in as urgent. Please let Karissa know to work on it ASAP. Thanks.

## 2021-08-17 NOTE — TELEPHONE ENCOUNTER
RT KNEE, [facility] ER 8/16. HAS  PRIME, GETTING IN CONTACT WITH JADYN CEBALLOS TO GET AN AUTH.

## 2021-08-17 NOTE — ED NOTES
Pt to ED via ambulance from home. Pt fell from standing. Denies loc, denies hitting head, denies blood thinners. Pt c/o right knee pain.   EMS gave 50mcg fentanyl and 4mg zofran. Right leg stabilized by EMS.  + pedal pulse.     Kathy Samuel, RN  08/16/21 2053

## 2021-08-17 NOTE — ED NOTES
Pt became bradycardic HR 40s and complaining of chest tightness. MD notified. See new orders.     Kathy Samuel RN  08/16/21 6885

## 2021-08-19 ENCOUNTER — PREP FOR SURGERY (OUTPATIENT)
Dept: OTHER | Facility: HOSPITAL | Age: 56
End: 2021-08-19

## 2021-08-19 ENCOUNTER — OFFICE VISIT (OUTPATIENT)
Dept: ORTHOPEDIC SURGERY | Facility: CLINIC | Age: 56
End: 2021-08-19

## 2021-08-19 VITALS — OXYGEN SATURATION: 98 % | HEIGHT: 65 IN | HEART RATE: 100 BPM | WEIGHT: 139 LBS | BODY MASS INDEX: 23.16 KG/M2

## 2021-08-19 DIAGNOSIS — S82.001A RIGHT PATELLA FRACTURE: Primary | ICD-10-CM

## 2021-08-19 DIAGNOSIS — S82.001A CLOSED DISPLACED FRACTURE OF RIGHT PATELLA, UNSPECIFIED FRACTURE MORPHOLOGY, INITIAL ENCOUNTER: Primary | ICD-10-CM

## 2021-08-19 PROBLEM — S82.042A DISPLACED COMMINUTED FRACTURE OF LEFT PATELLA, INITIAL ENCOUNTER FOR CLOSED FRACTURE: Status: ACTIVE | Noted: 2021-08-19

## 2021-08-19 PROCEDURE — 99204 OFFICE O/P NEW MOD 45 MIN: CPT | Performed by: ORTHOPAEDIC SURGERY

## 2021-08-19 RX ORDER — CEFAZOLIN SODIUM 2 G/100ML
2 INJECTION, SOLUTION INTRAVENOUS ONCE
Status: CANCELLED | OUTPATIENT
Start: 2021-08-19 | End: 2021-08-19

## 2021-08-19 NOTE — PROGRESS NOTES
"  Pain of the Right Knee     Subjective      Leola Montana presents to [facility] for evaluation of the right knee. The patient slipped and fell landing directly on her knee. She was seen in the ER and evaluated with x-rays. Her leg was placed into a splint and given crutches. She is ambulating on crutches today. She has no other complaints.     No Known Allergies     Social History     Socioeconomic History   • Marital status:      Spouse name: Not on file   • Number of children: Not on file   • Years of education: Not on file   • Highest education level: Not on file   Tobacco Use   • Smoking status: Former Smoker   • Smokeless tobacco: Never Used   • Tobacco comment: smoked 5 years, quite 30 years ago   Vaping Use   • Vaping Use: Never used   Substance and Sexual Activity   • Alcohol use: Yes     Comment: occasionally drinks         Review of Systems     Objective   Vital Signs:   Pulse 100   Ht 165.1 cm (65\")   Wt 63 kg (139 lb)   SpO2 98%   BMI 23.13 kg/m²       Physical Exam  Constitutional:       Appearance: Normal appearance. He is well-developed and normal weight.   HENT:      Head: Normocephalic.      Right Ear: Hearing and external ear normal.      Left Ear: Hearing and external ear normal.      Nose: Nose normal.   Eyes:      Conjunctiva/sclera: Conjunctivae normal.   Cardiovascular:      Rate and Rhythm: Normal rate.   Pulmonary:      Effort: Pulmonary effort is normal.      Breath sounds: No wheezing or rales.   Abdominal:      Palpations: Abdomen is soft.      Tenderness: There is no abdominal tenderness.   Musculoskeletal:      Cervical back: Normal range of motion.   Skin:     Findings: No rash.   Neurological:      Mental Status: He is alert and oriented to person, place, and time.   Psychiatric:         Mood and Affect: Mood and affect normal.         Judgment: Judgment normal.       Ortho Exam      Right lower extremity- Positive EHL, FHL, GS and TA. " Sensation intact to all 5 nerves of the foot. Positive pulses. Neurovascularly intact. Can wiggle her toes. Splint intact.     Procedures      Imaging Results (Most Recent)     None           Result Review :       XR Knee 3 View Right    Result Date: 8/16/2021  Narrative: PROCEDURE: XR KNEE 3 VW RIGHT  COMPARISON: None  INDICATIONS: RIGHT KNEE INJURY FROM FALL  FINDINGS:  There is a fracture through the mid aspect of the patella with separation of the 2 large fragments 2 cm.  The inferior fragment is tilted toward the joint.  There is a large amount of soft tissue swelling.  No other fracture is identified.  CONCLUSION: Fracture through the mid patella with separation of the 2 patellar fragments by 2 cm.      AMANDO PADILLA MD       Electronically Signed and Approved By: AMANDO PADILLA MD on 8/16/2021 at 22:27                      Assessment and Plan     DX: Right patella fracture    Discussed the treatment options with the patient, operative vs non-operative. Discussed the risks and benefits of ORIF Right patella fracture. The patient expressed understanding and wished to proceed.     Discussed surgery., Risks/benefits discussed with patient including, but not limited to: infection, bleeding, neurovascular damage, malunion, nonunion, aesthetic deformity, need for further surgery, and death., Discussed with patient the implant type being used during surgery and patient understands and desires to proceed., Surgery pamphlet given. and Call or return if worsening symptoms.    Follow Up     2 weeks postoperatively.       Patient was given instructions and counseling regarding her condition or for health maintenance advice. Please see specific information pulled into the AVS if appropriate.     Scribed for Scot Cuevas MD by Maggie Hurtado.  08/19/21   10:32 EDT    I have personally performed the services described in this document as scribed by the above individual and it is both accurate and complete. Scot HRAVEY  MD Mason 08/20/21

## 2021-08-19 NOTE — H&P (VIEW-ONLY)
"  Pain of the Right Knee     Subjective      Leola Montana presents to [facility] for evaluation of the right knee. The patient slipped and fell landing directly on her knee. She was seen in the ER and evaluated with x-rays. Her leg was placed into a splint and given crutches. She is ambulating on crutches today. She has no other complaints.     No Known Allergies     Social History     Socioeconomic History   • Marital status:      Spouse name: Not on file   • Number of children: Not on file   • Years of education: Not on file   • Highest education level: Not on file   Tobacco Use   • Smoking status: Former Smoker   • Smokeless tobacco: Never Used   • Tobacco comment: smoked 5 years, quite 30 years ago   Vaping Use   • Vaping Use: Never used   Substance and Sexual Activity   • Alcohol use: Yes     Comment: occasionally drinks         Review of Systems     Objective   Vital Signs:   Pulse 100   Ht 165.1 cm (65\")   Wt 63 kg (139 lb)   SpO2 98%   BMI 23.13 kg/m²       Physical Exam  Constitutional:       Appearance: Normal appearance. He is well-developed and normal weight.   HENT:      Head: Normocephalic.      Right Ear: Hearing and external ear normal.      Left Ear: Hearing and external ear normal.      Nose: Nose normal.   Eyes:      Conjunctiva/sclera: Conjunctivae normal.   Cardiovascular:      Rate and Rhythm: Normal rate.   Pulmonary:      Effort: Pulmonary effort is normal.      Breath sounds: No wheezing or rales.   Abdominal:      Palpations: Abdomen is soft.      Tenderness: There is no abdominal tenderness.   Musculoskeletal:      Cervical back: Normal range of motion.   Skin:     Findings: No rash.   Neurological:      Mental Status: He is alert and oriented to person, place, and time.   Psychiatric:         Mood and Affect: Mood and affect normal.         Judgment: Judgment normal.       Ortho Exam      Right lower extremity- Positive EHL, FHL, GS and TA. " Sensation intact to all 5 nerves of the foot. Positive pulses. Neurovascularly intact. Can wiggle her toes. Splint intact.     Procedures      Imaging Results (Most Recent)     None           Result Review :       XR Knee 3 View Right    Result Date: 8/16/2021  Narrative: PROCEDURE: XR KNEE 3 VW RIGHT  COMPARISON: None  INDICATIONS: RIGHT KNEE INJURY FROM FALL  FINDINGS:  There is a fracture through the mid aspect of the patella with separation of the 2 large fragments 2 cm.  The inferior fragment is tilted toward the joint.  There is a large amount of soft tissue swelling.  No other fracture is identified.  CONCLUSION: Fracture through the mid patella with separation of the 2 patellar fragments by 2 cm.      AMANDO PADILLA MD       Electronically Signed and Approved By: AMANDO PADILLA MD on 8/16/2021 at 22:27                      Assessment and Plan     DX: Right patella fracture    Discussed the treatment options with the patient, operative vs non-operative. Discussed the risks and benefits of ORIF Right patella fracture. The patient expressed understanding and wished to proceed.     Discussed surgery., Risks/benefits discussed with patient including, but not limited to: infection, bleeding, neurovascular damage, malunion, nonunion, aesthetic deformity, need for further surgery, and death., Discussed with patient the implant type being used during surgery and patient understands and desires to proceed., Surgery pamphlet given. and Call or return if worsening symptoms.    Follow Up     2 weeks postoperatively.       Patient was given instructions and counseling regarding her condition or for health maintenance advice. Please see specific information pulled into the AVS if appropriate.     Scribed for Scot Cuevas MD by Maggie Hurtado.  08/19/21   10:32 EDT    I have personally performed the services described in this document as scribed by the above individual and it is both accurate and complete. Scot HARVEY  MD Mason 08/20/21

## 2021-08-19 NOTE — PRE-PROCEDURE INSTRUCTIONS
PATIENT INSTRUCTED TO BE:    - NPO AFTER MIDNIGHT EXCEPT CAN HAVE CLEAR LIQUIDS 2 HOURS PRIOR TO SURGERY ARRIVAL TIME     - TO HOLD ALL VITAMINS, SUPPLEMENTS, NSAIDS FOR ONE WEEK PRIOR TO THEIR SURGICAL PROCEDURE    - INSTRUCTED TO TAKE THE FOLLOWING MEDICATIONS THE DAY OF SURGERY:     HYDROCODONE PRN, ZOFRAN PRN    PATIENT VERBALIZED UNDERSTANDING

## 2021-08-20 ENCOUNTER — ANESTHESIA EVENT (OUTPATIENT)
Dept: PERIOP | Facility: HOSPITAL | Age: 56
End: 2021-08-20

## 2021-08-20 ENCOUNTER — APPOINTMENT (OUTPATIENT)
Dept: GENERAL RADIOLOGY | Facility: HOSPITAL | Age: 56
End: 2021-08-20

## 2021-08-20 ENCOUNTER — HOSPITAL ENCOUNTER (OUTPATIENT)
Facility: HOSPITAL | Age: 56
Setting detail: HOSPITAL OUTPATIENT SURGERY
Discharge: HOME | End: 2021-08-20
Attending: ORTHOPAEDIC SURGERY | Admitting: ORTHOPAEDIC SURGERY

## 2021-08-20 ENCOUNTER — ANESTHESIA (OUTPATIENT)
Dept: PERIOP | Facility: HOSPITAL | Age: 56
End: 2021-08-20

## 2021-08-20 VITALS
HEART RATE: 70 BPM | OXYGEN SATURATION: 96 % | TEMPERATURE: 97.9 F | SYSTOLIC BLOOD PRESSURE: 108 MMHG | RESPIRATION RATE: 20 BRPM | HEIGHT: 65 IN | BODY MASS INDEX: 23.47 KG/M2 | WEIGHT: 140.87 LBS | DIASTOLIC BLOOD PRESSURE: 63 MMHG

## 2021-08-20 DIAGNOSIS — S82.001A RIGHT PATELLA FRACTURE: ICD-10-CM

## 2021-08-20 LAB — B-HCG UR QL: NEGATIVE

## 2021-08-20 PROCEDURE — 25010000002 DEXAMETHASONE PER 1 MG: Performed by: NURSE ANESTHETIST, CERTIFIED REGISTERED

## 2021-08-20 PROCEDURE — 76942 ECHO GUIDE FOR BIOPSY: CPT | Performed by: ORTHOPAEDIC SURGERY

## 2021-08-20 PROCEDURE — 25010000002 PROPOFOL 10 MG/ML EMULSION: Performed by: NURSE ANESTHETIST, CERTIFIED REGISTERED

## 2021-08-20 PROCEDURE — C1713 ANCHOR/SCREW BN/BN,TIS/BN: HCPCS | Performed by: ORTHOPAEDIC SURGERY

## 2021-08-20 PROCEDURE — 76000 FLUOROSCOPY <1 HR PHYS/QHP: CPT

## 2021-08-20 PROCEDURE — 25010000002 MIDAZOLAM PER 1MG: Performed by: ANESTHESIOLOGY

## 2021-08-20 PROCEDURE — 25010000002 ONDANSETRON PER 1 MG: Performed by: NURSE ANESTHETIST, CERTIFIED REGISTERED

## 2021-08-20 PROCEDURE — 27524 TREAT KNEECAP FRACTURE: CPT | Performed by: ORTHOPAEDIC SURGERY

## 2021-08-20 PROCEDURE — 73560 X-RAY EXAM OF KNEE 1 OR 2: CPT

## 2021-08-20 PROCEDURE — 81025 URINE PREGNANCY TEST: CPT | Performed by: ANESTHESIOLOGY

## 2021-08-20 PROCEDURE — 25010000003 CEFAZOLIN IN DEXTROSE 2-4 GM/100ML-% SOLUTION: Performed by: ORTHOPAEDIC SURGERY

## 2021-08-20 PROCEDURE — 25010000002 FENTANYL CITRATE (PF) 50 MCG/ML SOLUTION: Performed by: NURSE ANESTHETIST, CERTIFIED REGISTERED

## 2021-08-20 PROCEDURE — L1830 KO IMMOB CANVAS LONG PRE OTS: HCPCS | Performed by: ORTHOPAEDIC SURGERY

## 2021-08-20 PROCEDURE — C1769 GUIDE WIRE: HCPCS | Performed by: ORTHOPAEDIC SURGERY

## 2021-08-20 DEVICE — SCRW CANN SHRT THRD 1/3 4X36MM: Type: IMPLANTABLE DEVICE | Site: PATELLA | Status: FUNCTIONAL

## 2021-08-20 DEVICE — SCRW CANN SHRT THRD 1/3 4X38MM: Type: IMPLANTABLE DEVICE | Site: PATELLA | Status: FUNCTIONAL

## 2021-08-20 DEVICE — IMPLANTABLE DEVICE: Type: IMPLANTABLE DEVICE | Site: PATELLA | Status: FUNCTIONAL

## 2021-08-20 RX ORDER — DEXMEDETOMIDINE HYDROCHLORIDE 100 UG/ML
INJECTION, SOLUTION INTRAVENOUS AS NEEDED
Status: DISCONTINUED | OUTPATIENT
Start: 2021-08-20 | End: 2021-08-20 | Stop reason: SURG

## 2021-08-20 RX ORDER — ONDANSETRON 2 MG/ML
4 INJECTION INTRAMUSCULAR; INTRAVENOUS ONCE AS NEEDED
Status: DISCONTINUED | OUTPATIENT
Start: 2021-08-20 | End: 2021-08-20 | Stop reason: HOSPADM

## 2021-08-20 RX ORDER — PROMETHAZINE HYDROCHLORIDE 25 MG/1
25 SUPPOSITORY RECTAL ONCE AS NEEDED
Status: DISCONTINUED | OUTPATIENT
Start: 2021-08-20 | End: 2021-08-20 | Stop reason: HOSPADM

## 2021-08-20 RX ORDER — CEFAZOLIN SODIUM 2 G/100ML
2 INJECTION, SOLUTION INTRAVENOUS ONCE
Status: COMPLETED | OUTPATIENT
Start: 2021-08-20 | End: 2021-08-20

## 2021-08-20 RX ORDER — HYDROCODONE BITARTRATE AND ACETAMINOPHEN 7.5; 325 MG/1; MG/1
1 TABLET ORAL EVERY 4 HOURS PRN
Qty: 30 TABLET | Refills: 0 | Status: SHIPPED | OUTPATIENT
Start: 2021-08-20 | End: 2021-08-31

## 2021-08-20 RX ORDER — SODIUM CHLORIDE, SODIUM LACTATE, POTASSIUM CHLORIDE, CALCIUM CHLORIDE 600; 310; 30; 20 MG/100ML; MG/100ML; MG/100ML; MG/100ML
9 INJECTION, SOLUTION INTRAVENOUS CONTINUOUS PRN
Status: DISCONTINUED | OUTPATIENT
Start: 2021-08-20 | End: 2021-08-20 | Stop reason: HOSPADM

## 2021-08-20 RX ORDER — ACETAMINOPHEN 500 MG
1000 TABLET ORAL ONCE
Status: COMPLETED | OUTPATIENT
Start: 2021-08-20 | End: 2021-08-20

## 2021-08-20 RX ORDER — FENTANYL CITRATE 50 UG/ML
INJECTION, SOLUTION INTRAMUSCULAR; INTRAVENOUS AS NEEDED
Status: DISCONTINUED | OUTPATIENT
Start: 2021-08-20 | End: 2021-08-20 | Stop reason: SURG

## 2021-08-20 RX ORDER — OXYCODONE HYDROCHLORIDE 5 MG/1
5 TABLET ORAL
Status: DISCONTINUED | OUTPATIENT
Start: 2021-08-20 | End: 2021-08-20 | Stop reason: HOSPADM

## 2021-08-20 RX ORDER — PROPOFOL 10 MG/ML
VIAL (ML) INTRAVENOUS AS NEEDED
Status: DISCONTINUED | OUTPATIENT
Start: 2021-08-20 | End: 2021-08-20 | Stop reason: SURG

## 2021-08-20 RX ORDER — PROMETHAZINE HYDROCHLORIDE 12.5 MG/1
25 TABLET ORAL ONCE AS NEEDED
Status: DISCONTINUED | OUTPATIENT
Start: 2021-08-20 | End: 2021-08-20 | Stop reason: HOSPADM

## 2021-08-20 RX ORDER — BUPIVACAINE HYDROCHLORIDE AND EPINEPHRINE 5; 5 MG/ML; UG/ML
INJECTION, SOLUTION EPIDURAL; INTRACAUDAL; PERINEURAL
Status: COMPLETED | OUTPATIENT
Start: 2021-08-20 | End: 2021-08-20

## 2021-08-20 RX ORDER — PHENYLEPHRINE HCL IN 0.9% NACL 1 MG/10 ML
SYRINGE (ML) INTRAVENOUS AS NEEDED
Status: DISCONTINUED | OUTPATIENT
Start: 2021-08-20 | End: 2021-08-20 | Stop reason: SURG

## 2021-08-20 RX ORDER — LIDOCAINE HYDROCHLORIDE 20 MG/ML
INJECTION, SOLUTION INFILTRATION; PERINEURAL AS NEEDED
Status: DISCONTINUED | OUTPATIENT
Start: 2021-08-20 | End: 2021-08-20 | Stop reason: SURG

## 2021-08-20 RX ORDER — MEPERIDINE HYDROCHLORIDE 25 MG/ML
12.5 INJECTION INTRAMUSCULAR; INTRAVENOUS; SUBCUTANEOUS
Status: DISCONTINUED | OUTPATIENT
Start: 2021-08-20 | End: 2021-08-20 | Stop reason: HOSPADM

## 2021-08-20 RX ORDER — MAGNESIUM HYDROXIDE 1200 MG/15ML
LIQUID ORAL AS NEEDED
Status: DISCONTINUED | OUTPATIENT
Start: 2021-08-20 | End: 2021-08-20 | Stop reason: HOSPADM

## 2021-08-20 RX ORDER — DEXAMETHASONE SODIUM PHOSPHATE 4 MG/ML
INJECTION, SOLUTION INTRA-ARTICULAR; INTRALESIONAL; INTRAMUSCULAR; INTRAVENOUS; SOFT TISSUE AS NEEDED
Status: DISCONTINUED | OUTPATIENT
Start: 2021-08-20 | End: 2021-08-20 | Stop reason: SURG

## 2021-08-20 RX ORDER — MIDAZOLAM HYDROCHLORIDE 2 MG/2ML
2 INJECTION, SOLUTION INTRAMUSCULAR; INTRAVENOUS ONCE
Status: COMPLETED | OUTPATIENT
Start: 2021-08-20 | End: 2021-08-20

## 2021-08-20 RX ORDER — ONDANSETRON 2 MG/ML
INJECTION INTRAMUSCULAR; INTRAVENOUS AS NEEDED
Status: DISCONTINUED | OUTPATIENT
Start: 2021-08-20 | End: 2021-08-20 | Stop reason: SURG

## 2021-08-20 RX ADMIN — DEXMEDETOMIDINE HYDROCHLORIDE 10 MCG: 100 INJECTION, SOLUTION INTRAVENOUS at 12:47

## 2021-08-20 RX ADMIN — Medication 100 MCG: at 13:11

## 2021-08-20 RX ADMIN — BUPIVACAINE HYDROCHLORIDE AND EPINEPHRINE BITARTRATE 30 ML: 5; .005 INJECTION, SOLUTION EPIDURAL; INTRACAUDAL; PERINEURAL at 12:42

## 2021-08-20 RX ADMIN — SODIUM CHLORIDE, POTASSIUM CHLORIDE, SODIUM LACTATE AND CALCIUM CHLORIDE: 600; 310; 30; 20 INJECTION, SOLUTION INTRAVENOUS at 13:14

## 2021-08-20 RX ADMIN — Medication 100 MCG: at 13:40

## 2021-08-20 RX ADMIN — Medication 100 MCG: at 13:20

## 2021-08-20 RX ADMIN — LIDOCAINE HYDROCHLORIDE 80 MG: 20 INJECTION, SOLUTION INFILTRATION; PERINEURAL at 12:53

## 2021-08-20 RX ADMIN — Medication 200 MCG: at 13:46

## 2021-08-20 RX ADMIN — CEFAZOLIN SODIUM 2 G: 2 INJECTION, SOLUTION INTRAVENOUS at 12:46

## 2021-08-20 RX ADMIN — DEXAMETHASONE SODIUM PHOSPHATE 4 MG: 4 INJECTION INTRA-ARTICULAR; INTRALESIONAL; INTRAMUSCULAR; INTRAVENOUS; SOFT TISSUE at 13:10

## 2021-08-20 RX ADMIN — Medication 100 MCG: at 13:36

## 2021-08-20 RX ADMIN — MIDAZOLAM HYDROCHLORIDE 2 MG: 1 INJECTION, SOLUTION INTRAMUSCULAR; INTRAVENOUS at 12:27

## 2021-08-20 RX ADMIN — PROPOFOL 200 MG: 10 INJECTION, EMULSION INTRAVENOUS at 12:53

## 2021-08-20 RX ADMIN — DEXMEDETOMIDINE HYDROCHLORIDE 5 MCG: 100 INJECTION, SOLUTION INTRAVENOUS at 13:52

## 2021-08-20 RX ADMIN — Medication 100 MCG: at 13:37

## 2021-08-20 RX ADMIN — ACETAMINOPHEN 1000 MG: 500 TABLET ORAL at 11:02

## 2021-08-20 RX ADMIN — FENTANYL CITRATE 100 MCG: 50 INJECTION INTRAMUSCULAR; INTRAVENOUS at 12:53

## 2021-08-20 RX ADMIN — ONDANSETRON 4 MG: 2 INJECTION INTRAMUSCULAR; INTRAVENOUS at 13:10

## 2021-08-20 RX ADMIN — SODIUM CHLORIDE, POTASSIUM CHLORIDE, SODIUM LACTATE AND CALCIUM CHLORIDE 9 ML/HR: 600; 310; 30; 20 INJECTION, SOLUTION INTRAVENOUS at 11:02

## 2021-08-20 NOTE — ANESTHESIA POSTPROCEDURE EVALUATION
Patient: Leola Montana    Procedure Summary     Date: 08/20/21 Room / Location: [facility] OR 03 / [facility] MAIN OR    Anesthesia Start: 1246 Anesthesia Stop: 1415    Procedure: PATELLA OPEN REDUCTION INTERNAL FIXATION (Right Knee) Diagnosis:       Right patella fracture      (Right patella fracture [S82.001A])    Surgeons: Scot Cuevas MD Provider: Yamil Womack MD    Anesthesia Type: general, general with block ASA Status: 2          Anesthesia Type: general, general with block    Vitals  Vitals Value Taken Time   /52 08/20/21 1445   Temp 36.2 °C (97.1 °F) 08/20/21 1416   Pulse 75 08/20/21 1455   Resp 14 08/20/21 1435   SpO2 99 % 08/20/21 1455   Vitals shown include unvalidated device data.        Post Anesthesia Care and Evaluation    Patient location during evaluation: bedside  Patient participation: complete - patient participated  Level of consciousness: responsive to verbal stimuli and sleepy but conscious  Pain score: 2  Pain management: adequate  Airway patency: patent  Anesthetic complications: No anesthetic complications  PONV Status: none  Cardiovascular status: acceptable and stable  Respiratory status: acceptable and nasal cannula  Hydration status: acceptable    Comments: An Anesthesiologist personally participated in the most demanding procedures (including induction and emergence if applicable) in the anesthesia plan, monitored the course of anesthesia administration at frequent intervals and remained physically present and available for immediate diagnosis and treatment of emergencies.

## 2021-08-20 NOTE — DISCHARGE INSTRUCTIONS
DISCHARGE INSTRUCTIONS  ORTHOPEDICS      ? For your surgery you had:  ? General anesthesia (you may have a sore throat for the first 24 hours)  ? IV sedation.  ? Local anesthesia  ? Monitored anesthesia care  ? You received a medicated patch for nausea prevention today (behind the ear). It is recommended that you remove it 24-48 hours post-operatively. It must be removed within 72 hours.   ? You received an anesthesia medication today that can cause hormonal forms of birth control to be ineffective. You should use a different form of birth control (to prevent pregnancy) for 7 days.   ? You may experience dizziness, drowsiness, or light-headedness for several hours following surgery  ? Do not stay alone today or tonight.  ? Limit your activity for 24 hours.  ? Resume your diet slowly.  Follow whatever special dietary instructions you may have been given by the doctor.  ? You should not drive or operate machinery or drink alcohol for 24 hours or while you are taking pain medication.  ? You should not sign any legally binding documents.  ? If you had an axillary or regional block, you will not have control of the involved limb for up to 12 hours.  Protect the arm with a sling or follow your physician's specific instructions.  ? You may remove dressing:  [] in 24 hours  [] in 48 hours  [] Other:    ? You may shower or bathe:    ? Sleep with the injured part elevated on a pillow.  ? Medications per physician's instructions as indicated on Discharge Medication Information Sheet.  ? Follow verbal instructions of your doctor.  ? Carry the upper arm in a sling so that the hand and wrist are above the level of the heart.  ? Sit with the lower leg propped up on a footstool or chair with pillows.  ? Exercise fingers or toes for 10 minutes every hour while awake. ? Ice bag to injured area for 72 hours.  Apply 20 minutes on - 20 minutes off.  Never place ice directly on skin or cast.    Avoid getting cast or dressing wet.  ? The  Cold Therapy System can help reduce swelling and decrease pain.  Utilize device for 30-60 minutes per session, with 30-60 minute breaks in between sessions.  It is recommended to use, as directed, for the first 72 hours after surgery until bedtime.  After 72 hours, continue using the device as needed until your follow-up appointment with your physician.  Never place directly on skin.  Please refer to the instruction sheet given.  ? In addition to these instructions, follow the discharge instructions on postoperative arthroscopic surgery.  SPECIAL INSTRUCTIONS:           Last dose of pain medication was given at:    NOTIFY THE PHYSICIAN IF YOU EXPERIENCE:  1. Numbness of fingers or toes.  2. Inability to move fingers or toes.  3. Extreme coldness, paleness or blue dis-coloration of fingers or toes.  4. Excessive swelling of affected surgical site or swelling that causes the cast to rub or cut into skin.  5. Pain unrelieved by pain medication  6. Nausea/vomiting not relieved by prescribed medication  7. Unable to urinate in 6 hours after surgery  8. Temperature greater than 101 degree Fahrenheit or chills  9. If unable to reach your doctor, please go to the closest emergency room  You should see Dr. MORIN for follow-up care   On      September 3RD. 8AM    Phone number: [phone]

## 2021-08-20 NOTE — ANESTHESIA PREPROCEDURE EVALUATION
Anesthesia Evaluation     Patient summary reviewed and Nursing notes reviewed   no history of anesthetic complications:  NPO Solid Status: > 8 hours  NPO Liquid Status: > 2 hours           Airway   Mallampati: II  Dental      Pulmonary - negative pulmonary ROS   Cardiovascular - negative cardio ROS  Exercise tolerance: good (4-7 METS)        Neuro/Psych  (+) headaches, psychiatric history,     GI/Hepatic/Renal/Endo - negative ROS     Musculoskeletal     (+) neck pain,   Abdominal    Substance History - negative use     OB/GYN negative ob/gyn ROS         Other   arthritis,                    Anesthesia Plan    ASA 2     general and general with block   (Patient understands anesthesia not responsible for dental damage. Regional anesthesia options discussed with patient. Pt accepts regional block.)  intravenous induction     Anesthetic plan, all risks, benefits, and alternatives have been provided, discussed and informed consent has been obtained with: patient.  Use of blood products discussed with patient .   Plan discussed with CRNA.

## 2021-08-20 NOTE — ANESTHESIA PROCEDURE NOTES
Peripheral Block      Patient reassessed immediately prior to procedure    Patient location during procedure: pre-op  Start time: 8/20/2021 12:38 PM  Stop time: 8/20/2021 12:43 PM  Reason for block: at surgeon's request and post-op pain management  Performed by  Anesthesiologist: Yamil Womack MD  Preanesthetic Checklist  Completed: patient identified, IV checked, site marked, risks and benefits discussed, surgical consent, monitors and equipment checked, pre-op evaluation and timeout performed  Prep:  Pt Position: supine  Sterile barriers:alcohol skin prep, partial drape, cap, washed/disinfected hands, mask and gloves  Prep: ChloraPrep  Patient monitoring: blood pressure monitoring, continuous pulse oximetry and EKG  Procedure  Sedation:yes  Performed under: local infiltration  Guidance:ultrasound guided and nerve stimulator  ULTRASOUND INTERPRETATION.  Using ultrasound guidance a 20 G gauge needle was placed in close proximity to the femoral nerve, at which point, under ultrasound guidance anesthetic was injected in the area of the nerve and spread of the anesthesia was seen on ultrasound in close proximity thereto.  There were no abnormalities seen on ultrasound; a digital image was taken; and the patient tolerated the procedure with no complications. Images:still images obtained, printed/placed on chart    Laterality:right  Block Type:femoral  Injection Technique:single-shot  Needle Type:echogenic  Needle Gauge:20 G (4in)  Resistance on Injection: none    Medications Used: bupivacaine-EPINEPHrine PF (MARCAINE w/EPI) 0.5% -[id number] injection, 30 mL  Med admintered at 8/20/2021 12:42 PM      Post Assessment  Injection Assessment: negative aspiration for heme, no paresthesia on injection and incremental injection  Patient Tolerance:comfortable throughout block  Complications:no  Additional Notes  The block or continuous infusion is requested by the referring physician for management of postoperative pain, or  pain related to a procedure. Ultrasound guidance (deemed medically necessary). Painless injection, pt was awake and conversant during the procedure without complications. Needle and surrounding structures visualized throughout procedure. No adverse reactions or complications seen during this period. Post-procedure image showed no signs of complication, and anatomy was consistent with an uncomplicated nerve blockade.

## 2021-08-20 NOTE — OP NOTE
PATELLA OPEN REDUCTION INTERNAL FIXATION  Procedure Report    Patient Name:  Leola Montana  YOB: 1965    Date of Surgery:  8/20/2021     Indications: The patient had a mechanical fall onto her right knee and sustained a displaced patella fracture.  She was seen in the emergency department where a splint was placed.  She was evaluated and plan for surgical fixation patella.  We discussed the risk of surgery including bleeding, infection, damage to neurovascular structures, anesthesia complications, malunion, nonunion, symptomatic hardware, continued pain and disability, need for additional procedures among others.  Informed consent was obtained and they wished to proceed.    Pre-op Diagnosis:   Right patella fracture [S82.001A]       Post-Op Diagnosis Codes:     * Right patella fracture [S82.001A]    Procedure/CPT® Codes:      Procedure(s):  PATELLA OPEN REDUCTION INTERNAL FIXATION    Staff:  Surgeon(s):  Scot Cuevas MD    Assistant: Adela Martinez    Anesthesia: General with Block    Estimated Blood Loss: 25 mL    Implants:    Implant Name Type Inv. Item Serial No.  Lot No. LRB No. Used Action   WR CERCLG COILS 1MM 10METER - [id number] Implant WR CERCLG COILS 1MM 10METER  DEPUY SYNTHES  Right 1 Implanted   SCRW CELINA SHRT THRD 1/3 4X36MM - [id number] Implant SCRW CELINA SHRT THRD 1/3 4X36MM  DEPUY SYNTHES  Right 1 Implanted   SCRW CELINA SHRT THRD 1/3 4X38MM - [id number] Implant SCRW CELINA SHRT THRD 1/3 4X38MM  DEPUY SYNTHES  Right 1 Implanted       Specimen:          None        Findings: Right patella fracture    Complications: None    Description of Procedure: Operative site was marked in the preoperative holding area.  The patient was brought to the operating room and placed supine on the operating room table.  General anesthesia was given.  All bony prominences were padded.  Preoperative femoral nerve block performed by anesthesia.  The patient was placed supine on the OR table.   The extremity was prepped and draped in usual fashion, preoperative antibiotic was given.  The limb was exsanguinated and tourniquet was inflated.  An anterior incision was made over the knee.  A hematoma was drained at the fracture site.  There is a transverse patella fracture with a small amount of comminution medially.  The small medial comminuted fragment of the patella was excised.  The fracture edges were debrided and reduced.  There was secured with a point-to-point reduction clamp.  2 1.25 mm K wires were placed parallel from proximal to distal.  This was confirmed on the lateral view and the fracture was well reduced.  We could also fill through the medial defect that the fracture was well reduced.  The screw lengths were measured drilled and then partially-threaded cannulated screws were inserted from distal to proximal.  1.0 mm wire was then placed into pieces through the wires.  They were then attached and tightened to form a tension band.  The wires were cut and buried in the quadriceps tendon.  Final fluoroscopic images were taken.  The fracture was well fixed.  The retinacular tissues which was torn medially and laterally was repaired with #2 FiberWire in figure-of-eight fashion.  Tourniquet was released and bleeding was minimal.  The knee was irrigated and was closed with 2-0 undyed Vicryl and staples.  An Aquacel dressing and sterile dressing was placed.  An ACL type rehab brace locked in full extension was placed.  Patient will from anesthesia in stable condition.  There were no complications and all counts were correct.   patient was stable to recovery.    Assistant: Adela Martinez  was responsible for performing the following activities: Retraction, Suction, Irrigation and Placing Dressing and their skilled assistance was necessary for the success of this case.    Scot Cuevas MD     Date: 8/20/2021  Time: 14:24 EDT

## 2021-08-21 RX ORDER — OXYCODONE AND ACETAMINOPHEN 7.5; 325 MG/1; MG/1
1 TABLET ORAL EVERY 4 HOURS PRN
Qty: 18 TABLET | Refills: 0 | Status: SHIPPED | OUTPATIENT
Start: 2021-08-21 | End: 2021-08-31

## 2021-08-31 ENCOUNTER — OFFICE VISIT (OUTPATIENT)
Dept: FAMILY MEDICINE CLINIC | Facility: CLINIC | Age: 56
End: 2021-08-31

## 2021-08-31 VITALS
OXYGEN SATURATION: 100 % | HEIGHT: 65 IN | BODY MASS INDEX: 23.13 KG/M2 | SYSTOLIC BLOOD PRESSURE: 102 MMHG | DIASTOLIC BLOOD PRESSURE: 68 MMHG | TEMPERATURE: 98.8 F | HEART RATE: 87 BPM

## 2021-08-31 DIAGNOSIS — S82.001S CLOSED DISPLACED FRACTURE OF RIGHT PATELLA, UNSPECIFIED FRACTURE MORPHOLOGY, SEQUELA: ICD-10-CM

## 2021-08-31 DIAGNOSIS — R53.83 FATIGUE, UNSPECIFIED TYPE: ICD-10-CM

## 2021-08-31 DIAGNOSIS — I95.9 HYPOTENSION, UNSPECIFIED HYPOTENSION TYPE: Primary | ICD-10-CM

## 2021-08-31 DIAGNOSIS — N93.9 EXCESSIVE VAGINAL BLEEDING: ICD-10-CM

## 2021-08-31 LAB
25(OH)D3 SERPL-MCNC: 36.7 NG/ML (ref 30–100)
ALBUMIN SERPL-MCNC: 4.5 G/DL (ref 3.5–5.2)
ALBUMIN/GLOB SERPL: 1.4 G/DL
ALP SERPL-CCNC: 115 U/L (ref 39–117)
ALT SERPL W P-5'-P-CCNC: 42 U/L (ref 1–33)
ANION GAP SERPL CALCULATED.3IONS-SCNC: 15.1 MMOL/L (ref 5–15)
AST SERPL-CCNC: 30 U/L (ref 1–32)
BASOPHILS # BLD AUTO: 0.03 10*3/MM3 (ref 0–0.2)
BASOPHILS NFR BLD AUTO: 0.3 % (ref 0–1.5)
BILIRUB SERPL-MCNC: 0.3 MG/DL (ref 0–1.2)
BUN SERPL-MCNC: 14 MG/DL (ref 6–20)
BUN/CREAT SERPL: 23.7 (ref 7–25)
CALCIUM SPEC-SCNC: 9.7 MG/DL (ref 8.6–10.5)
CHLORIDE SERPL-SCNC: 100 MMOL/L (ref 98–107)
CO2 SERPL-SCNC: 21.9 MMOL/L (ref 22–29)
CREAT SERPL-MCNC: 0.59 MG/DL (ref 0.57–1)
DEPRECATED RDW RBC AUTO: 43.7 FL (ref 37–54)
EOSINOPHIL # BLD AUTO: 0.14 10*3/MM3 (ref 0–0.4)
EOSINOPHIL NFR BLD AUTO: 1.5 % (ref 0.3–6.2)
ERYTHROCYTE [DISTWIDTH] IN BLOOD BY AUTOMATED COUNT: 12.9 % (ref 12.3–15.4)
FERRITIN SERPL-MCNC: 132 NG/ML (ref 13–150)
FOLATE SERPL-MCNC: >20 NG/ML (ref 4.78–24.2)
GFR SERPL CREATININE-BSD FRML MDRD: 106 ML/MIN/1.73
GLOBULIN UR ELPH-MCNC: 3.3 GM/DL
GLUCOSE SERPL-MCNC: 88 MG/DL (ref 65–99)
HCT VFR BLD AUTO: 41.4 % (ref 34–46.6)
HGB BLD-MCNC: 13.2 G/DL (ref 12–15.9)
IRON 24H UR-MRATE: 44 MCG/DL (ref 37–145)
IRON SATN MFR SERPL: 11 % (ref 20–50)
LYMPHOCYTES # BLD AUTO: 1.56 10*3/MM3 (ref 0.7–3.1)
LYMPHOCYTES NFR BLD AUTO: 17.3 % (ref 19.6–45.3)
MCH RBC QN AUTO: 29.3 PG (ref 26.6–33)
MCHC RBC AUTO-ENTMCNC: 31.9 G/DL (ref 31.5–35.7)
MCV RBC AUTO: 91.8 FL (ref 79–97)
MONOCYTES # BLD AUTO: 0.59 10*3/MM3 (ref 0.1–0.9)
MONOCYTES NFR BLD AUTO: 6.5 % (ref 5–12)
NEUTROPHILS NFR BLD AUTO: 6.63 10*3/MM3 (ref 1.7–7)
NEUTROPHILS NFR BLD AUTO: 73.4 % (ref 42.7–76)
PLATELET # BLD AUTO: 496 10*3/MM3 (ref 140–450)
PMV BLD AUTO: 9.4 FL (ref 6–12)
POTASSIUM SERPL-SCNC: 4.3 MMOL/L (ref 3.5–5.2)
PROT SERPL-MCNC: 7.8 G/DL (ref 6–8.5)
RBC # BLD AUTO: 4.51 10*6/MM3 (ref 3.77–5.28)
SODIUM SERPL-SCNC: 137 MMOL/L (ref 136–145)
TIBC SERPL-MCNC: 401 MCG/DL (ref 298–536)
TRANSFERRIN SERPL-MCNC: 269 MG/DL (ref 200–360)
VIT B12 BLD-MCNC: 663 PG/ML (ref 211–946)
WBC # BLD AUTO: 9.04 10*3/MM3 (ref 3.4–10.8)

## 2021-08-31 PROCEDURE — 82728 ASSAY OF FERRITIN: CPT | Performed by: NURSE PRACTITIONER

## 2021-08-31 PROCEDURE — 82306 VITAMIN D 25 HYDROXY: CPT | Performed by: NURSE PRACTITIONER

## 2021-08-31 PROCEDURE — 82607 VITAMIN B-12: CPT | Performed by: NURSE PRACTITIONER

## 2021-08-31 PROCEDURE — 99213 OFFICE O/P EST LOW 20 MIN: CPT | Performed by: NURSE PRACTITIONER

## 2021-08-31 PROCEDURE — 82746 ASSAY OF FOLIC ACID SERUM: CPT | Performed by: NURSE PRACTITIONER

## 2021-08-31 PROCEDURE — 83540 ASSAY OF IRON: CPT | Performed by: NURSE PRACTITIONER

## 2021-08-31 PROCEDURE — 85025 COMPLETE CBC W/AUTO DIFF WBC: CPT | Performed by: NURSE PRACTITIONER

## 2021-08-31 PROCEDURE — 84466 ASSAY OF TRANSFERRIN: CPT | Performed by: NURSE PRACTITIONER

## 2021-08-31 PROCEDURE — 80053 COMPREHEN METABOLIC PANEL: CPT | Performed by: NURSE PRACTITIONER

## 2021-08-31 RX ORDER — ASPIRIN 81 MG/1
81 TABLET ORAL DAILY
COMMUNITY
End: 2021-10-05

## 2021-08-31 NOTE — PATIENT INSTRUCTIONS
Hypotension  As your heart beats, it forces blood through your body. This force is called blood pressure. If you have hypotension, you have low blood pressure. When your blood pressure is too low, you may not get enough blood to your brain or other parts of your body. This may cause you to feel weak, light-headed, have a fast heartbeat, or even pass out (faint). Low blood pressure may be harmless, or it may cause serious problems.  What are the causes?  · Blood loss.  · Not enough water in the body (dehydration).  · Heart problems.  · Hormone problems.  · Pregnancy.  · A very bad infection.  · Not having enough of certain nutrients.  · Very bad allergic reactions.  · Certain medicines.  What increases the risk?  · Age. The risk increases as you get older.  · Conditions that affect the heart or the brain and spinal cord (central nervous system).  · Taking certain medicines.  · Being pregnant.  What are the signs or symptoms?  · Feeling:  ? Weak.  ? Light-headed.  ? Dizzy.  ? Tired (fatigued).  · Blurred vision.  · Fast heartbeat.  · Passing out, in very bad cases.  How is this treated?  · Changing your diet. This may involve eating more salt (sodium) or drinking more water.  · Taking medicines to raise your blood pressure.  · Changing how much you take (the dosage) of some of your medicines.  · Wearing compression stockings. These stockings help to prevent blood clots and reduce swelling in your legs.  In some cases, you may need to go to the hospital for:  · Fluid replacement. This means you will receive fluids through an IV tube.  · Blood replacement. This means you will receive donated blood through an IV tube (transfusion).  · Treating an infection or heart problems, if this applies.  · Monitoring. You may need to be monitored while medicines that you are taking wear off.  Follow these instructions at home:  Eating and drinking    · Drink enough fluids to keep your pee (urine) pale yellow.  · Eat a healthy diet.  Follow instructions from your doctor about what you can eat or drink. A healthy diet includes:  ? Fresh fruits and vegetables.  ? Whole grains.  ? Low-fat (lean) meats.  ? Low-fat dairy products.  · Eat extra salt only as told. Do not add extra salt to your diet unless your doctor tells you to.  · Eat small meals often.  · Avoid standing up quickly after you eat.  Medicines  · Take over-the-counter and prescription medicines only as told by your doctor.  ? Follow instructions from your doctor about changing how much you take of your medicines, if this applies.  ? Do not stop or change any of your medicines on your own.  General instructions    · Wear compression stockings as told by your doctor.  · Get up slowly from lying down or sitting.  · Avoid hot showers and a lot of heat as told by your doctor.  · Return to your normal activities as told by your doctor. Ask what activities are safe for you.  · Do not use any products that contain nicotine or tobacco, such as cigarettes, e-cigarettes, and chewing tobacco. If you need help quitting, ask your doctor.  · Keep all follow-up visits as told by your doctor. This is important.  Contact a doctor if:  · You throw up (vomit).  · You have watery poop (diarrhea).  · You have a fever for more than 2-3 days.  · You feel more thirsty than normal.  · You feel weak and tired.  Get help right away if:  · You have chest pain.  · You have a fast or uneven heartbeat.  · You lose feeling (have numbness) in any part of your body.  · You cannot move your arms or your legs.  · You have trouble talking.  · You get sweaty or feel light-headed.  · You pass out.  · You have trouble breathing.  · You have trouble staying awake.  · You feel mixed up (confused).  Summary  · Hypotension is also called low blood pressure. It is when the force of blood pumping through your arteries is too weak.  · Hypotension may be harmless, or it may cause serious problems.  · Treatment may include changing  your diet and medicines, and wearing compression stockings.  · In very bad cases, you may need to go to the hospital.  This information is not intended to replace advice given to you by your health care provider. Make sure you discuss any questions you have with your health care provider.  Document Revised: 06/13/2019 Document Reviewed: 06/13/2019  [employer] Patient Education © 2021 [employer]    Fatigue  If you have fatigue, you feel tired all the time and have a lack of energy or a lack of motivation. Fatigue may make it difficult to start or complete tasks because of exhaustion. In general, occasional or mild fatigue is often a normal response to activity or life. However, long-lasting (chronic) or extreme fatigue may be a symptom of a medical condition.  Follow these instructions at home:  General instructions  · Watch your fatigue for any changes.  · Go to bed and get up at the same time every day.  · Avoid fatigue by pacing yourself during the day and getting enough sleep at night.  · Maintain a healthy weight.  Medicines  · Take over-the-counter and prescription medicines only as told by your health care provider.  · Take a multivitamin, if told by your health care provider.   · Do not use herbal or dietary supplements unless they are approved by your health care provider.  Activity    · Exercise regularly, as told by your health care provider.  · Use or practice techniques to help you relax, such as yoga, graciela chi, meditation, or massage therapy.  Eating and drinking    · Avoid heavy meals in the evening.  · Eat a well-balanced diet, which includes lean proteins, whole grains, plenty of fruits and vegetables, and low-fat dairy products.  · Avoid consuming too much caffeine.  · Avoid the use of alcohol.  · Drink enough fluid to keep your urine pale yellow.  Lifestyle  · Change situations that cause you stress. Try to keep your work and personal schedule in balance.  · Do not use any products that contain  nicotine or tobacco, such as cigarettes and e-cigarettes. If you need help quitting, ask your health care provider.  · Do not use drugs.  Contact a health care provider if:  · Your fatigue does not get better.  · You have a fever.  · You suddenly lose or gain weight.  · You have headaches.  · You have trouble falling asleep or sleeping through the night.  · You feel angry, guilty, anxious, or sad.  · You are unable to have a bowel movement (constipation).  · Your skin is dry.  · You have swelling in your legs or another part of your body.  Get help right away if:  · You feel confused.  · Your vision is blurry.  · You feel faint or you pass out.  · You have a severe headache.  · You have severe pain in your abdomen, your back, or the area between your waist and hips (pelvis).  · You have chest pain, shortness of breath, or an irregular or fast heartbeat.  · You are unable to urinate, or you urinate less than normal.  · You have abnormal bleeding, such as bleeding from the rectum, vagina, nose, lungs, or nipples.  · You vomit blood.  · You have thoughts about hurting yourself or others.  If you ever feel like you may hurt yourself or others, or have thoughts about taking your own life, get help right away. You can go to your nearest emergency department or call:  · Your local emergency services (911 in the [place]  · A suicide crisis helpline, such as the National Suicide Prevention Lifeline at 1-792.603.3275. This is open 24 hours a day.  Summary  · If you have fatigue, you feel tired all the time and have a lack of energy or a lack of motivation.  · Fatigue may make it difficult to start or complete tasks because of exhaustion.  · Long-lasting (chronic) or extreme fatigue may be a symptom of a medical condition.  · Exercise regularly, as told by your health care provider.  · Change situations that cause you stress. Try to keep your work and personal schedule in balance.  This information is not intended to replace  advice given to you by your health care provider. Make sure you discuss any questions you have with your health care provider.  Document Revised: 07/08/2020 Document Reviewed: 09/12/2018  [employer] Patient Education © 2021 [employer]

## 2021-08-31 NOTE — PROGRESS NOTES
"Chief Complaint  Follow-up (surgery on right knee) and Hypotension    Subjective          Leola Montana presents to [facility]  History of Present Illness  She presents today for follow-up regarding her blood pressure after having surgery.  She had fell and fractured her right patella on 2021.  She had surgery on the right patella on 2021.  She was started on Eliquis for DVT preventation.  She started having excessive vaginal bleeding so she went to the urgent care on 2021.  She was told to stop the Eliquis and she was started on aspirin 81 mg daily for prevention.  She states the vaginal bleeding has stopped.  She states that her blood pressure has been too low and when she was told to follow-up with her PCP.  She is not on any other medications.  She states she does feel very tired.  She states she does drink enough water.  Her blood pressure is noted to be 102/68 today.  She is complaining of swelling of her right lower leg. She states she is no longer taking pain medications. She states she takes Tylenol as needed with good relief.    Patient was also seen by Swetha Mueller, NP student.    Objective   Vital Signs:   /68   Pulse 87   Temp 98.8 °F (37.1 °C)   Ht 165.1 cm (65\")   SpO2 100%   BMI 23.13 kg/m²     Physical Exam  Vitals reviewed.   Constitutional:       Appearance: Normal appearance. She is well-developed.   Neck:      Thyroid: No thyroid mass, thyromegaly or thyroid tenderness.   Cardiovascular:      Rate and Rhythm: Normal rate and regular rhythm.      Heart sounds: No murmur heard.   No friction rub. No gallop.    Pulmonary:      Effort: Pulmonary effort is normal.      Breath sounds: Normal breath sounds. No wheezing or rhonchi.   Musculoskeletal:      Right lower le+ Edema present.      Comments: Right leg with immobilized brace, pt is in wheelchair. No redness or tenderness to lower extremity.     Lymphadenopathy:      Cervical: No " cervical adenopathy.   Skin:     General: Skin is warm and dry.   Neurological:      Mental Status: She is alert and oriented to person, place, and time.      Cranial Nerves: No cranial nerve deficit.   Psychiatric:         Mood and Affect: Mood and affect normal.         Behavior: Behavior normal.         Thought Content: Thought content normal. Thought content does not include homicidal or suicidal ideation.         Judgment: Judgment normal.        Result Review :                 Assessment and Plan    Diagnoses and all orders for this visit:    1. Hypotension, unspecified hypotension type (Primary)  Comments:  Stable, will continue to monitor.   Orders:  -     CBC & Differential  -     Comprehensive Metabolic Panel    2. Fatigue, unspecified type  Comments:  Due to fatigue after recent excessive bleeding, we will check labs today.   Orders:  -     CBC & Differential  -     Ferritin  -     Iron Profile  -     Vitamin B12 & Folate  -     Comprehensive Metabolic Panel  -     Vitamin D 25 Hydroxy    3. Excessive vaginal bleeding  -     CBC & Differential  -     Ferritin  -     Iron Profile    4. Closed displaced fracture of right patella, unspecified fracture morphology, sequela  Comments:  Advised to elevated RLE as often as possible to decrease swelling.   Orders:  -     Vitamin D 25 Hydroxy        Follow Up   Return if symptoms worsen or fail to improve.  Patient was given instructions and counseling regarding her condition or for health maintenance advice. Please see specific information pulled into the AVS if appropriate.

## 2021-09-02 ENCOUNTER — TELEPHONE (OUTPATIENT)
Dept: FAMILY MEDICINE CLINIC | Facility: CLINIC | Age: 56
End: 2021-09-02

## 2021-09-02 NOTE — TELEPHONE ENCOUNTER
Caller: eLola Montana    Relationship: Self    Best call back number: [phone]    What test was performed: BLOOD WORK     When was the test performed: 8/31/21    Where was the test performed: IN OFFICE    Additional notes: PATIENT STATED SHE REVIEWED LABS ON MYCHART AND HAS QUESTIONS

## 2021-09-02 NOTE — TELEPHONE ENCOUNTER
Pt was offered an appointment. Pt decline due to having had recent surgery. Pt is in no hurry and will wait for MA to return call.

## 2021-09-03 ENCOUNTER — OFFICE VISIT (OUTPATIENT)
Dept: ORTHOPEDIC SURGERY | Facility: CLINIC | Age: 56
End: 2021-09-03

## 2021-09-03 VITALS — HEIGHT: 65 IN | RESPIRATION RATE: 14 BRPM | BODY MASS INDEX: 23.16 KG/M2 | WEIGHT: 139 LBS

## 2021-09-03 DIAGNOSIS — M25.561 RIGHT KNEE PAIN, UNSPECIFIED CHRONICITY: Primary | ICD-10-CM

## 2021-09-03 DIAGNOSIS — Z98.890 S/P ORIF (OPEN REDUCTION INTERNAL FIXATION) FRACTURE: ICD-10-CM

## 2021-09-03 DIAGNOSIS — Z87.81 S/P ORIF (OPEN REDUCTION INTERNAL FIXATION) FRACTURE: ICD-10-CM

## 2021-09-03 PROCEDURE — 99024 POSTOP FOLLOW-UP VISIT: CPT | Performed by: ORTHOPAEDIC SURGERY

## 2021-09-03 NOTE — TELEPHONE ENCOUNTER
Caller: Leola Montana    Relationship: Self    Best call back number: [phone]    What is the best time to reach you:     Who are you requesting to speak with (clinical staff, provider,  specific staff member): MS. [facility]ULIPJFELIBERTO'S NURSE.     What was the call regarding: THE PATIENT HAS CALLED, STATING SHE REVIEWED HER BLOOD WORK RESULTS ONLINE BUT IS NOW REQUESTING TO SPEAK WITH CLINICAL IN REGARDS TO RESULTS.     Do you require a callback: YES

## 2021-09-03 NOTE — TELEPHONE ENCOUNTER
Spoke with pt and let her know what her lab results was. Also let her know that she could find out what Lena had said about her lab results on her mychart. Pt understood and will be going to get the over the counter vitamins.

## 2021-09-03 NOTE — PROGRESS NOTES
"Chief Complaint  Pain of the Right Knee     Subjective      Leola Montana presents to [facility] for follow up evaluation of the right knee. She is S/P Right Patella Open Reduction Internal Fixation 2021. Her staples were removed today. She has been wearing a brace. The patient reports she would like a smaller brace. She is overall doing well and her pain is controlled.     No Known Allergies     Social History     Socioeconomic History   • Marital status:      Spouse name: Not on file   • Number of children: Not on file   • Years of education: Not on file   • Highest education level: Not on file   Tobacco Use   • Smoking status: Former Smoker     Packs/day: 0.20     Years: 5.00     Pack years: 1.00     Types: Cigarettes     Quit date:      Years since quittin.6   • Smokeless tobacco: Never Used   • Tobacco comment: smoked 5 years, quite 30 years ago   Vaping Use   • Vaping Use: Never used   Substance and Sexual Activity   • Alcohol use: Yes     Comment: occasionally drinks    • Drug use: Never   • Sexual activity: Defer        Review of Systems     Objective   Vital Signs:   Resp 14   Ht 165.1 cm (65\")   Wt 63 kg (139 lb)   BMI 23.13 kg/m²       Physical Exam  Constitutional:       Appearance: Normal appearance. The patient is well-developed and normal weight.   HENT:      Head: Normocephalic.      Right Ear: Hearing and external ear normal.      Left Ear: Hearing and external ear normal.      Nose: Nose normal.   Eyes:      Conjunctiva/sclera: Conjunctivae normal.   Cardiovascular:      Rate and Rhythm: Normal rate.   Pulmonary:      Effort: Pulmonary effort is normal.      Breath sounds: No wheezing or rales.   Abdominal:      Palpations: Abdomen is soft.      Tenderness: There is no abdominal tenderness.   Musculoskeletal:      Cervical back: Normal range of motion.   Skin:     Findings: No rash.   Neurological:      Mental Status: The patient is alert and " oriented to person, place, and time.   Psychiatric:         Mood and Affect: Mood and affect normal.         Judgment: Judgment normal.       Ortho Exam      Right knee- incision well healing. No signs of infection. Neurovascularly intact. Positive EHL, FHL, GS and TA. Sensation intact to all 5 nerves of the foot. Positive pulses. Mild swelling. Stable to varus/valgus stress. Stable to anterior/posterior drawer. Difficulty holding a straight leg raise.     Procedures    X-Ray Report:  Right knee(s) X-Ray  Indication: Evaluation of right knee pain  AP and Lateral view(s)  Findings: patella fracture in good alignment. No hardware failure.   Prior studies available for comparison: yes           Imaging Results (Most Recent)     None           Result Review :       XR Knee 1 or 2 View Right    Result Date: 8/20/2021  Narrative: PROCEDURE: XR KNEE 1 OR 2 VW RIGHT  COMPARISON: [facility], , XR KNEE 3 VW RIGHT, 8/16/2021, 22:08.  INDICATIONS: RIGHT PATELLA FX/FLUORO TIME 27.2/1.51 mGy/3 IMAGES  FINDINGS:  Intraoperative fluoroscopy provided for interval fixation of the patella fracture.  The patella is fixed using 2 screws and wires.  CONCLUSION: Intraoperative fluoroscopy.  Refer to the operative note for details.      REUBEN ARANA MD       Electronically Signed and Approved By: REUBEN ARANA MD on 8/20/2021 at 14:13             XR Knee 3 View Right    Result Date: 8/16/2021  Narrative: PROCEDURE: XR KNEE 3 VW RIGHT  COMPARISON: None  INDICATIONS: RIGHT KNEE INJURY FROM FALL  FINDINGS:  There is a fracture through the mid aspect of the patella with separation of the 2 large fragments 2 cm.  The inferior fragment is tilted toward the joint.  There is a large amount of soft tissue swelling.  No other fracture is identified.  CONCLUSION: Fracture through the mid patella with separation of the 2 patellar fragments by 2 cm.      AMANDO PADILLA MD       Electronically Signed and Approved By: AMANDO PADILLA MD on  8/16/2021 at 22:27             FL < 1 Hour    Result Date: 8/20/2021  Narrative: This procedure was auto-finalized with no dictation required.    Peripheral Block    Result Date: 8/20/2021  Narrative: Yamil Womack MD     8/20/2021 12:47 PM Peripheral Block Patient reassessed immediately prior to procedure Patient location during procedure: pre-op Start time: 8/20/2021 12:38 PM Stop time: 8/20/2021 12:43 PM Reason for block: at surgeon's request and post-op pain management Performed by Anesthesiologist: Yamil Womack MD Preanesthetic Checklist Completed: patient identified, IV checked, site marked, risks and benefits discussed, surgical consent, monitors and equipment checked, pre-op evaluation and timeout performed Prep: Pt Position: supine Sterile barriers:alcohol skin prep, partial drape, cap, washed/disinfected hands, mask and gloves Prep: ChloraPrep Patient monitoring: blood pressure monitoring, continuous pulse oximetry and EKG Procedure Sedation:yes Performed under: local infiltration Guidance:ultrasound guided and nerve stimulator ULTRASOUND INTERPRETATION.  Using ultrasound guidance a 20 G gauge needle was placed in close proximity to the femoral nerve, at which point, under ultrasound guidance anesthetic was injected in the area of the nerve and spread of the anesthesia was seen on ultrasound in close proximity thereto.  There were no abnormalities seen on ultrasound; a digital image was taken; and the patient tolerated the procedure with no complications. Images:still images obtained, printed/placed on chart Laterality:right Block Type:femoral Injection Technique:single-shot Needle Type:echogenic Needle Gauge:20 G (4in) Resistance on Injection: none Medications Used: bupivacaine-EPINEPHrine PF (MARCAINE w/EPI) 0.5% -1:[id number] injection, 30 mL Med admintered at 8/20/2021 12:42 PM Post Assessment Injection Assessment: negative aspiration for heme, no paresthesia on injection and incremental injection  Patient Tolerance:comfortable throughout block Complications:no Additional Notes The block or continuous infusion is requested by the referring physician for management of postoperative pain, or pain related to a procedure. Ultrasound guidance (deemed medically necessary). Painless injection, pt was awake and conversant during the procedure without complications. Needle and surrounding structures visualized throughout procedure. No adverse reactions or complications seen during this period. Post-procedure image showed no signs of complication, and anatomy was consistent with an uncomplicated nerve blockade.              Assessment and Plan     DX:  S/P Right Patella Open Reduction Internal Fixation    Discussed the treatment plan with the patient.  Order for physical therapy given today. Plan to continue her brace with 30 degrees of flexion. Can discontinue aspirin. Will likely transition to a smaller brace at her follow up.     Call or return if worsening symptoms.    Follow Up     4 weeks with repeat x-rays      Patient was given instructions and counseling regarding her condition or for health maintenance advice. Please see specific information pulled into the AVS if appropriate.     Scribed for Scot Cuevas MD by Maggie Hurtado.  09/03/21   08:02 EDT  I have personally performed the services described in this document as scribed by the above individual and it is both accurate and complete. Scot Cuevas MD 09/06/21

## 2021-09-27 ENCOUNTER — HOSPITAL ENCOUNTER (OUTPATIENT)
Dept: ULTRASOUND IMAGING | Facility: HOSPITAL | Age: 56
Discharge: HOME | End: 2021-09-27
Admitting: NURSE PRACTITIONER

## 2021-09-27 DIAGNOSIS — E04.1 THYROID NODULE: ICD-10-CM

## 2021-09-27 PROCEDURE — 76536 US EXAM OF HEAD AND NECK: CPT

## 2021-09-30 PROBLEM — Z47.89 AFTERCARE FOLLOWING SURGERY OF THE MUSCULOSKELETAL SYSTEM: Status: ACTIVE | Noted: 2021-09-30

## 2021-10-01 ENCOUNTER — OFFICE VISIT (OUTPATIENT)
Dept: ORTHOPEDIC SURGERY | Facility: CLINIC | Age: 56
End: 2021-10-01

## 2021-10-01 VITALS — BODY MASS INDEX: 24.03 KG/M2 | HEIGHT: 65 IN | WEIGHT: 144.2 LBS | HEART RATE: 85 BPM | OXYGEN SATURATION: 100 %

## 2021-10-01 DIAGNOSIS — Z47.89 AFTERCARE FOLLOWING SURGERY OF THE MUSCULOSKELETAL SYSTEM: Primary | ICD-10-CM

## 2021-10-01 DIAGNOSIS — Z47.89 AFTERCARE FOLLOWING SURGERY OF THE MUSCULOSKELETAL SYSTEM: ICD-10-CM

## 2021-10-01 PROCEDURE — 99024 POSTOP FOLLOW-UP VISIT: CPT | Performed by: PHYSICIAN ASSISTANT

## 2021-10-01 NOTE — PROGRESS NOTES
"Chief Complaint  Pain and Follow-up of the Right Knee    Subjective          Leola Montana is a 55 y.o. female  presents to [facility] for   History of Present Illness    Patient presents with her  for follow-up evaluation of ORIF right patella fracture, 2021.  Patient states that she has been doing well she states that she has not had any pain she states that she continues to attend physical therapy she admits to tightness but denies pain, denies need for pain medication or NSAIDs.  Patient has been using the ACL brace she states it has been falling down her leg, not helping her knee.  Dr. Cuevas discussed a smaller brace for today's visit.  Patient denies new injury or symptoms of pain, she continues to gain range of motion and strength.  No new complaints.  No Known Allergies     Social History     Socioeconomic History   • Marital status:      Spouse name: Not on file   • Number of children: Not on file   • Years of education: Not on file   • Highest education level: Not on file   Tobacco Use   • Smoking status: Former Smoker     Packs/day: 0.20     Years: 5.00     Pack years: 1.00     Types: Cigarettes     Quit date:      Years since quittin.7   • Smokeless tobacco: Never Used   • Tobacco comment: smoked 5 years, quite 30 years ago   Vaping Use   • Vaping Use: Never used   Substance and Sexual Activity   • Alcohol use: Yes     Comment: occasionally drinks    • Drug use: Never   • Sexual activity: Defer        REVIEW OF SYSTEMS    Constitutional: Denies fevers, chills, weight loss  Cardiovascular: Denies chest pain, shortness of breath  Skin: Denies rashes, acute skin changes  Neurologic: Denies headache, loss of consciousness  MSK: Right knee pain      Objective   Vital Signs:   Pulse 85   Ht 165.1 cm (65\")   Wt 65.4 kg (144 lb 3.2 oz)   SpO2 100%   BMI 24.00 kg/m²     Body mass index is 24 kg/m².    Physical Exam    Right knee: Incision is " well-healed, no erythema, ecchymosis, no swelling, no signs of infection, nontender to palpation, full extension, flexion 80, nontender calf, negative Homans' sign, patient able to hold straight leg raise.  Neurovascularly intact.    Procedures    Imaging Results (Most Recent)     Procedure Component Value Units Date/Time    XR Knee AP & Lateral [[id number]] Resulted: 10/01/21 1437     Updated: 10/01/21 1437    Narrative:      • View:AP and Lateral view(s)  • Site: Right knee  • Indication: Right knee pain  • Study: X-rays ordered, taken in the office, and reviewed today  • X-ray: Good healing of patella fracture, intact screw and wire to ORIF   patella fracture, no signs of hardware failure.  Good alignment.  • Comparative data: No comparative data found             Result Review :   The following data was reviewed by: JASMYNE Almanzar on 10/01/2021:  Data reviewed: Radiologic studies Reviewed by me with the patient             Assessment and Plan    Diagnoses and all orders for this visit:    1. Aftercare following surgery of right patella ORIF, 8/20/2021 (Primary)  -     XR Knee AP & Lateral  -     Ambulatory Referral to Physical Therapy Evaluate and treat (2-3x/week for 6-8 weeks)    2. Aftercare following surgery of the musculoskeletal system  -     XR Knee AP & Lateral  -     Ambulatory Referral to Physical Therapy Evaluate and treat (2-3x/week for 6-8 weeks)        Reviewed x-rays with the patient and her spouse, advised them patient may discontinue ACL brace she will be placed into a hinged knee brace, use this with all activity, continue physical therapy and new orders written, if new or concerning symptoms occur follow-up sooner otherwise follow-up in 4 weeks with x-rays.    Call or return if worsening symptoms.    Follow Up   Return in about 4 weeks (around 10/29/2021).  Patient was given instructions and counseling regarding her condition or for health maintenance advice. Please see specific  information pulled into the AVS if appropriate.

## 2021-10-05 ENCOUNTER — TRANSCRIBE ORDERS (OUTPATIENT)
Dept: OBSTETRICS AND GYNECOLOGY | Facility: CLINIC | Age: 56
End: 2021-10-05

## 2021-10-05 ENCOUNTER — OFFICE VISIT (OUTPATIENT)
Dept: OBSTETRICS AND GYNECOLOGY | Facility: CLINIC | Age: 56
End: 2021-10-05

## 2021-10-05 VITALS
DIASTOLIC BLOOD PRESSURE: 64 MMHG | BODY MASS INDEX: 22.66 KG/M2 | SYSTOLIC BLOOD PRESSURE: 100 MMHG | HEIGHT: 66 IN | WEIGHT: 141 LBS

## 2021-10-05 DIAGNOSIS — D21.9 LEIOMYOMA: ICD-10-CM

## 2021-10-05 DIAGNOSIS — N95.0 POSTMENOPAUSAL BLEEDING: Primary | ICD-10-CM

## 2021-10-05 PROCEDURE — 99214 OFFICE O/P EST MOD 30 MIN: CPT | Performed by: OBSTETRICS & GYNECOLOGY

## 2021-10-05 NOTE — PROGRESS NOTES
"GYN Visit    CC:   Chief Complaint   Patient presents with   • PMB   • Vaginal Odor       HPI:   55 y.o.No obstetric history on file. Contraception or HRT: Post menopausal, using no HRT    Broke knee cap Aug 2021, surgery, started on Eloquis, then VB.  Bright red, like a period, lasted 4-5 days.  Called Ortho and they stopped Eloquis after 2 days, then only brown.  Now w odor vaginal.  No itch.  No pelvic pain.    History: PMHx, Meds, Allergies, PSHx, Social Hx, and POBHx all reviewed and updated.    PHYSICAL EXAM:  /64   Ht 166.4 cm (65.5\")   Wt 64 kg (141 lb)   BMI 23.11 kg/m²   General- NAD, alert and oriented, appropriate  Psych- Normal mood, good memory    External genitalia- Normal female, no lesions  Urethra/meatus- Normal, no masses, non tender, no prolapse  Bladder- Normal, no masses, non tender, no prolapse  Vagina- Normal, mild atrophy, no lesions, no discharge, no prolapse  Cvx- Normal, no lesions, no discharge, No cervical motion tenderness.  SMALL AMOUNT OF BLOOD IN MUCOUS OF CVX.  NO ACTIVE VB.  Uterus- Normal size, shape & consistency.  Non tender, mobile, & no prolapse  Adnexa- No mass, non tender  Anus/Rectum/Perineum- Not performed    Lymphatic- No palpable groin nodes  Ext- No edema, no cyanosis    Skin- No lesions, no rashes, no acanthosis nigricans        ASSESSMENT AND PLAN:  Diagnoses and all orders for this visit:    1. Postmenopausal bleeding (Primary)  -     US Pelvis Transvaginal Non OB; Future  Self reported hx of fibroids    DW pt DDX of  bleeding.  hx of fibroids, none felt today and w recent Eloquis use, suspect that as etiology.  DDx includes malignancy.        Follow Up:  Return in about 2 weeks (around 10/19/2021) for Follow up US possible EMBx.    I spent 30 minutes caring for Leola on this date of service. This time includes time spent by me in the following activities:preparing for the visit, obtaining and/or reviewing a separately obtained history, performing a " medically appropriate examination and/or evaluation , counseling and educating the patient/family/caregiver, ordering medications, tests, or procedures and documenting information in the medical record      Niurka Abreu DO  10/05/2021    [facility]  [address] [place] [address]  Dept: [phone]  Dept Fax: [phone]  Loc: [phone]  Loc Fax: [phone]

## 2021-10-15 ENCOUNTER — OFFICE VISIT (OUTPATIENT)
Dept: OTOLARYNGOLOGY | Facility: CLINIC | Age: 56
End: 2021-10-15

## 2021-10-15 VITALS — BODY MASS INDEX: 22.92 KG/M2 | HEIGHT: 66 IN | WEIGHT: 142.6 LBS | TEMPERATURE: 97.2 F

## 2021-10-15 DIAGNOSIS — E04.2 MULTINODULAR THYROID: Primary | ICD-10-CM

## 2021-10-15 DIAGNOSIS — R09.89 GLOBUS SENSATION: ICD-10-CM

## 2021-10-15 PROCEDURE — 99213 OFFICE O/P EST LOW 20 MIN: CPT | Performed by: NURSE PRACTITIONER

## 2021-10-15 NOTE — PROGRESS NOTES
Patient Name: Leola Montana   Visit Date: 10/15/2021   Patient ID: [id number]  Provider: CHEYANNE Subramanian    Sex: female  [place] [facility]   YOB: 1965  Location Address: [address] [place] [address], [address],?[place]?[address]    Primary Care Provider Lena Prakash APRN  Location Phone: [phone]    Referring Provider: Vasyl Galindo DO        Chief Complaint  Other (1 year f/u thyroid)    Subjective          Leola Montana is a 55 y.o. female who presents to [facility] for a follow-up visit of thyroid nodules and globus sensation.  She was last seen 1 year ago at which time she was having right-sided globus sensation.  Flexible laryngoscopy revealed normal findings.  I did start her on PPI therapy as I suspected she was having some silent reflux.  She reports that her globus sensation is completely resolved.  We are also watching 3 thyroid nodules so she presents today for follow-up with a 1 year thyroid ultrasound.  She did have this ultrasound performed on 2021.  It revealed a normal size thyroid gland.  In the right lobe she has an 8 mm thyroid nodule that appears similar to the previous exam from 1 year ago.  Additionally there is a 6 mm right-sided thyroid nodule that is also unchanged.  In the left thyroid lobe there is a 7 mm nodule that was previously seen.  There are macrocalcifications that appear new but this lesion is still considered a TI-RADS category 4 nodule.  Recommendation is repeat thyroid ultrasound in 12 months per ACR guidelines.    No current outpatient medications on file prior to visit.     No current facility-administered medications on file prior to visit.        Social History     Tobacco Use   • Smoking status: Former Smoker     Packs/day: 0.20     Years: 5.00     Pack years: 1.00     Types: Cigarettes     Quit date:      Years since quittin.8   • Smokeless tobacco: Never Used   • Tobacco  "comment: smoked 5 years, quite 30 years ago   Vaping Use   • Vaping Use: Never used   Substance Use Topics   • Alcohol use: Yes     Comment: occasionally drinks    • Drug use: Never        Objective     Vital Signs:   Temp 97.2 °F (36.2 °C) (Temporal)   Ht 166.4 cm (65.5\")   Wt 64.7 kg (142 lb 9.6 oz)   BMI 23.37 kg/m²       Physical Exam  Constitutional:       General: She is not in acute distress.     Appearance: Normal appearance. She is not ill-appearing.   HENT:      Head: Normocephalic and atraumatic.      Jaw: There is normal jaw occlusion. No tenderness or pain on movement.      Salivary Glands: Right salivary gland is not diffusely enlarged or tender. Left salivary gland is not diffusely enlarged or tender.      Right Ear: Tympanic membrane, ear canal and external ear normal.      Left Ear: Tympanic membrane, ear canal and external ear normal.      Nose: Nose normal. No septal deviation.      Right Sinus: No maxillary sinus tenderness or frontal sinus tenderness.      Left Sinus: No maxillary sinus tenderness or frontal sinus tenderness.      Mouth/Throat:      Lips: Pink. No lesions.      Mouth: Mucous membranes are moist. No oral lesions.      Dentition: Normal dentition.      Tongue: No lesions.      Palate: No mass and lesions.      Pharynx: Oropharynx is clear. Uvula midline.      Tonsils: No tonsillar exudate.   Eyes:      Extraocular Movements: Extraocular movements intact.      Conjunctiva/sclera: Conjunctivae normal.      Pupils: Pupils are equal, round, and reactive to light.   Neck:      Thyroid: No thyroid mass, thyromegaly or thyroid tenderness.      Trachea: Trachea normal.   Pulmonary:      Effort: Pulmonary effort is normal. No respiratory distress.   Musculoskeletal:         General: Normal range of motion.      Cervical back: Normal range of motion and neck supple. No tenderness.   Lymphadenopathy:      Cervical: No cervical adenopathy.   Skin:     General: Skin is warm and dry. "   Neurological:      General: No focal deficit present.      Mental Status: She is alert and oriented to person, place, and time.      Cranial Nerves: No cranial nerve deficit.      Motor: No weakness.      Gait: Gait normal.   Psychiatric:         Mood and Affect: Mood normal.         Behavior: Behavior normal.         Thought Content: Thought content normal.         Judgment: Judgment normal.                Result Review :          US Thyroid (09/27/2021 14:12)  See HPI     Assessment and Plan    Diagnoses and all orders for this visit:    1. Multinodular thyroid (Primary)    2. Globus sensation    Patient is doing better with her globus sensation.  She reports that this is completely resolved.  She does have 3 thyroid nodules that are essentially the same from 1 year ago.  We will order a repeat thyroid ultrasound to be performed in 12 months I will plan to see her back at that time for follow-up.  She will call sooner with any new symptoms.       Follow Up   Return in about 1 year (around 10/15/2022), or after thyroid US.  Patient was given instructions and counseling regarding her condition or for health maintenance advice. Please see specific information pulled into the AVS if appropriate.     CHEYANNE Subramanian thyr

## 2021-11-03 NOTE — PROGRESS NOTES
"GYN Visit  Endometrial Biopsy Procedure Note      CC:  Pt presents for FU possible Endometrial Bx  Chief Complaint   Patient presents with   • Follow-up     FU US     Consent signed: Yes    HPI:   55 y.o.     Here for FU after US and possible EMBx.    Pt now states has menses q3mo, last 4-5days, on heavy days changes q3hrs.  No pain.  Has hot flashes and night sweats, started last year. Mother in menopause at 54 y/o.  Sister menopause late 50's.  Was told 4-5yrs ago by labs was \"perimenopause\"    Seen 10/5 for what I thought was  bleeding (now perimenopausal) after orthopedic surgery (approx 2mo after last menses):   Broke knee cap Aug 2021, surgery, started on Eloquis, then VB.  Bright red, like a period, lasted 4-5 days.  Called Ortho and they stopped Eloquis after 2 days, then only brown.  No pelvic pain.  Exam then wnl w only small amt of blood in cvx mucous.    Has had no more VB since last OV. Has known hx of fibroids.    History: PMHx, Meds, Allergies, PSHx, Social Hx, and POBHx all reviewed and updated.    PHYSICAL EXAM:  /71   Pulse 82   Wt 64.4 kg (142 lb)   BMI 23.27 kg/m²   General- NAD, alert and oriented, appropriate  Psych- Normal mood, good memory  External genitalia- Without lesion   Vulva/Vagina/Perineum- Without lesion  Cvx- Without lesion  Uterus- Anteverted, CW FIBROIDS, IRREGULAR CONTOUR  Betadine/Hibiclens x3.  Tenaculum placed.  Uterus sounded to 8cm.    EMBx performed without difficulty.    Tissue sent for pathology.    Patient tolerated the procedure well.    Ext- No edema, no cyanosis    Skin- No lesions, no rashes, no acanthosis nigricans      ASSESSMENT AND PLAN:  Diagnoses and all orders for this visit:    1. Abnormal perimenopausal bleeding (Primary)  -     Tissue Pathology Exam  -     Endometrial Biopsy      At age and FHx suspect perimenopause, discussed unable to r/o malignancy wo bx.  Reviewed option check FSH and if no menopause then follow.  Pt desired EMBx.  She " understands approx 95% sensitive.  Will call w results.    • TRACK MENSES, RTO if <q21d, >7d long, heavy or painful.     • Post bx precautions, bleeding, pain, infection.     Follow Up:  Return menses become more frequent, long or heavy.    I spent 20 minutes caring for Anja on this date of service. This time includes time spent by me in the following activities:preparing for the visit, reviewing tests, obtaining and/or reviewing a separately obtained history, performing a medically appropriate examination and/or evaluation , counseling and educating the patient/family/caregiver, ordering medications, tests, or procedures, documenting information in the medical record and 20min discussion today re options.  EMBx done today instead of FU extra OV for Bx.      Niurka Abreu,   11/05/2021    [facility] [place] [facility]  [address] [place] [address]  Dept: [phone]  Dept Fax: [phone]  Loc: [phone]  Loc Fax: [phone]

## 2021-11-05 ENCOUNTER — OFFICE VISIT (OUTPATIENT)
Dept: ORTHOPEDIC SURGERY | Facility: CLINIC | Age: 56
End: 2021-11-05

## 2021-11-05 ENCOUNTER — OFFICE VISIT (OUTPATIENT)
Dept: OBSTETRICS AND GYNECOLOGY | Facility: CLINIC | Age: 56
End: 2021-11-05

## 2021-11-05 VITALS — OXYGEN SATURATION: 99 % | BODY MASS INDEX: 22.82 KG/M2 | HEART RATE: 79 BPM | WEIGHT: 142 LBS | HEIGHT: 66 IN

## 2021-11-05 VITALS
WEIGHT: 142 LBS | DIASTOLIC BLOOD PRESSURE: 71 MMHG | HEART RATE: 82 BPM | BODY MASS INDEX: 23.27 KG/M2 | SYSTOLIC BLOOD PRESSURE: 144 MMHG

## 2021-11-05 DIAGNOSIS — Z47.89 AFTERCARE FOLLOWING SURGERY OF THE MUSCULOSKELETAL SYSTEM: ICD-10-CM

## 2021-11-05 DIAGNOSIS — Z47.89 AFTERCARE FOLLOWING SURGERY OF THE MUSCULOSKELETAL SYSTEM: Primary | ICD-10-CM

## 2021-11-05 DIAGNOSIS — N92.4 ABNORMAL PERIMENOPAUSAL BLEEDING: Primary | ICD-10-CM

## 2021-11-05 DIAGNOSIS — M76.31 IT BAND SYNDROME, RIGHT: ICD-10-CM

## 2021-11-05 PROCEDURE — 99213 OFFICE O/P EST LOW 20 MIN: CPT | Performed by: OBSTETRICS & GYNECOLOGY

## 2021-11-05 PROCEDURE — 99024 POSTOP FOLLOW-UP VISIT: CPT | Performed by: PHYSICIAN ASSISTANT

## 2021-11-05 PROCEDURE — 88305 TISSUE EXAM BY PATHOLOGIST: CPT | Performed by: OBSTETRICS & GYNECOLOGY

## 2021-11-05 PROCEDURE — 58100 BIOPSY OF UTERUS LINING: CPT | Performed by: OBSTETRICS & GYNECOLOGY

## 2021-11-05 NOTE — PROGRESS NOTES
"Chief Complaint  Follow-up of the Right Knee    Subjective          Leola Montana is a 55 y.o. female  presents to [facility] for   History of Present Illness    Patient presents for follow-up evaluation of ORIF right patella fracture, 2021.  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  Patient denies swelling, denies pain, no new complaints today.  No Known Allergies     Social History     Socioeconomic History   • Marital status:    Tobacco Use   • Smoking status: Former Smoker     Packs/day: 0.20     Years: 5.00     Pack years: 1.00     Types: Cigarettes     Quit date:      Years since quittin.8   • Smokeless tobacco: Never Used   • Tobacco comment: smoked 5 years, quite 30 years ago   Vaping Use   • Vaping Use: Never used   Substance and Sexual Activity   • Alcohol use: Yes     Comment: occasionally drinks    • Drug use: Never   • Sexual activity: Defer        REVIEW OF SYSTEMS    Constitutional: Denies fevers, chills, weight loss  Cardiovascular: Denies chest pain, shortness of breath  Skin: Denies rashes, acute skin changes  Neurologic: Denies headache, loss of consciousness  MSK: Right knee pain      Objective   Vital Signs:   Pulse 79   Ht 166.4 cm (65.5\")   Wt 64.4 kg (142 lb)   SpO2 99%   BMI 23.27 kg/m²     Body mass index is 23.27 kg/m².    Physical Exam    Right knee: Incision is well-healed, no erythema, ecchymosis, no swelling, no effusion, no signs of infection, nontender to palpation, full extension, flexion 120, stable to varus/valgus stress, stable " anterior/posterior drawer.  Patient has tenderness to palpation of the lateral right hip down the side of her leg to the knee.    Procedures    Imaging Results (Most Recent)     Procedure Component Value Units Date/Time    XR Knee 3 View Right [[id number]] Resulted: 11/05/21 1159     Updated: 11/05/21 1200    Narrative:      • View:AP, Lateral and Sunrise view(s)  • Site: Right knee  • Indication: Right knee pain  • Study: X-rays ordered, taken in the office, and reviewed today  • X-ray: Good healing of patella fracture, intact screw and wire to ORIF   patella fracture, no signs of hardware failure, good alignment.  • Comparative data: No comparative data found             Result Review :   The following data was reviewed by: JASMYNE Almanzar on 11/05/2021:  Data reviewed: Radiologic studies Reviewed by me with the patient             Assessment and Plan    Diagnoses and all orders for this visit:    1. Aftercare following surgery of right patella ORIF, 8/20/2021 (Primary)  -     XR Knee 3 View Right    2. It band syndrome, right    3. Aftercare following surgery of the musculoskeletal system  -     XR Knee 3 View Right        Reviewed x-rays with the patient, advised her that she may start home exercise program, she was advised to avoid high impact exercises, continue to work on strength and range of motion.  Discussed diagnosis of possible IT band syndrome, patient was given home exercises for this, follow-up sooner if any new or concerning symptoms occur otherwise follow-up in 6 weeks for reevaluation and x-rays.    Call or return if worsening symptoms.    Follow Up   Return in about 6 weeks (around 12/17/2021) for Recheck.  Patient was given instructions and counseling regarding her condition or for health maintenance advice. Please see specific information pulled into the AVS if appropriate.

## 2021-11-09 LAB
CYTO UR: NORMAL
LAB AP CASE REPORT: NORMAL
LAB AP CLINICAL INFORMATION: NORMAL
PATH REPORT.FINAL DX SPEC: NORMAL
PATH REPORT.GROSS SPEC: NORMAL

## 2021-11-10 ENCOUNTER — TELEPHONE (OUTPATIENT)
Dept: OBSTETRICS AND GYNECOLOGY | Facility: CLINIC | Age: 56
End: 2021-11-10

## 2021-11-10 NOTE — TELEPHONE ENCOUNTER
Patient advised of results and recommendations. She understands to track her cycles and call if they are <21 days apart, >7 days in length, or heavy. Patient voiced understanding.

## 2021-11-10 NOTE — TELEPHONE ENCOUNTER
----- Message from Niurka Abreu DO sent at 11/9/2021  5:37 PM EST -----  Call pt with normal biopsy  Shows proliferative cells which IS consistent with the fact she probably has NOT gone into complete menopause yet.  Track menses.  RTO if frequent, long or heavy.

## 2021-12-14 ENCOUNTER — OFFICE VISIT (OUTPATIENT)
Dept: ORTHOPEDIC SURGERY | Facility: CLINIC | Age: 56
End: 2021-12-14

## 2021-12-14 VITALS — HEART RATE: 79 BPM | HEIGHT: 65 IN | BODY MASS INDEX: 24.16 KG/M2 | OXYGEN SATURATION: 99 % | WEIGHT: 145 LBS

## 2021-12-14 DIAGNOSIS — Z47.89 AFTERCARE FOLLOWING SURGERY OF THE MUSCULOSKELETAL SYSTEM: ICD-10-CM

## 2021-12-14 DIAGNOSIS — M76.31 IT BAND SYNDROME, RIGHT: ICD-10-CM

## 2021-12-14 DIAGNOSIS — Z47.89 AFTERCARE FOLLOWING SURGERY OF THE MUSCULOSKELETAL SYSTEM: Primary | ICD-10-CM

## 2021-12-14 PROCEDURE — 99213 OFFICE O/P EST LOW 20 MIN: CPT | Performed by: PHYSICIAN ASSISTANT

## 2021-12-14 NOTE — PROGRESS NOTES
"Chief Complaint  Pain and Follow-up of the Right Knee    Subjective          Leola Montana is a 55 y.o. female  presents to [facility] for   History of Present Illness    Patient presents for follow-up evaluation of ORIF right patella fracture, 2021.  Patient states she has continued to do well she states the pain from her IT band syndrome has improved she states she has remained very active she does workout every day and denies pain in her knee, denies locking catching or buckling of the knee.  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  No Known Allergies     Social History     Socioeconomic History   • Marital status:    Tobacco Use   • Smoking status: Former Smoker     Packs/day: 0.20     Years: 5.00     Pack years: 1.00     Types: Cigarettes     Quit date:      Years since quittin.9   • Smokeless tobacco: Never Used   • Tobacco comment: smoked 5 years, quite 30 years ago   Vaping Use   • Vaping Use: Never used   Substance and Sexual Activity   • Alcohol use: Yes     Comment: occasionally drinks    • Drug use: Never   • Sexual activity: Defer        REVIEW OF SYSTEMS    Constitutional: Denies fevers, chills, weight loss  Cardiovascular: Denies chest pain, shortness of breath  Skin: Denies rashes, acute skin changes  Neurologic: Denies headache, loss of consciousness  MSK: Right knee pain      Objective   Vital Signs:   Pulse 79   Ht 165.1 cm (65\")   Wt 65.8 kg (145 lb)   SpO2 99%   BMI 24.13 kg/m²     Body mass index is 24.13 kg/m².    Physical Exam    Right knee: Well-healed incision to the knee, no erythema, no ecchymosis, no swelling, no effusion, no signs of infection, nontender to palpation, full extension, patient able to hold straight leg raise, 5 out of 5 strength with straight leg raise, flexion 130, stable to varus/valgus stress, stable anterior/posterior " drawer.  Nontender hip/thigh.    Procedures    Imaging Results (Most Recent)     Procedure Component Value Units Date/Time    XR Knee 3 View Right [[id number]] Resulted: 12/14/21 0930     Updated: 12/14/21 0931    Narrative:      • View:AP and Lateral view(s)  • Site: Right knee  • Indication: Right knee pain  • Study: X-rays ordered, taken in the office, and reviewed today  • X-ray: Well-healed patella fracture, intact screws and wiring, no signs   of hardware failure, good alignment.  • Comparative data: No comparative data found             Result Review :   The following data was reviewed by: JASMYNE Almanzar on 12/14/2021:  Data reviewed: Radiologic studies Reviewed by me with the patient             Assessment and Plan    Diagnoses and all orders for this visit:    1. Aftercare following surgery of right patella ORIF, 8/20/2021 (Primary)  -     XR Knee 3 View Right    2. It band syndrome, right    3. Aftercare following surgery of the musculoskeletal system  -     XR Knee 3 View Right        Discussed diagnosis and treatment options with the patient she is advised to continue to weight-bear and activity as tolerated if any new or concerning symptoms occur to follow-up sooner otherwise follow-up as needed, patient agrees.    Call or return if worsening symptoms.    Follow Up   Return if symptoms worsen or fail to improve.  Patient was given instructions and counseling regarding her condition or for health maintenance advice. Please see specific information pulled into the AVS if appropriate.

## 2022-01-18 ENCOUNTER — OFFICE VISIT (OUTPATIENT)
Dept: FAMILY MEDICINE CLINIC | Facility: CLINIC | Age: 57
End: 2022-01-18

## 2022-01-18 VITALS
SYSTOLIC BLOOD PRESSURE: 120 MMHG | HEART RATE: 66 BPM | BODY MASS INDEX: 24.29 KG/M2 | TEMPERATURE: 98 F | DIASTOLIC BLOOD PRESSURE: 70 MMHG | HEIGHT: 65 IN | OXYGEN SATURATION: 98 % | WEIGHT: 145.8 LBS

## 2022-01-18 DIAGNOSIS — Z00.00 ANNUAL PHYSICAL EXAM: Primary | ICD-10-CM

## 2022-01-18 DIAGNOSIS — Z23 NEED FOR TDAP VACCINATION: ICD-10-CM

## 2022-01-18 DIAGNOSIS — Z12.31 ENCOUNTER FOR SCREENING MAMMOGRAM FOR MALIGNANT NEOPLASM OF BREAST: ICD-10-CM

## 2022-01-18 DIAGNOSIS — E61.1 IRON DEFICIENCY: ICD-10-CM

## 2022-01-18 DIAGNOSIS — Z01.00 EYE EXAM, ROUTINE: ICD-10-CM

## 2022-01-18 DIAGNOSIS — E04.1 THYROID NODULE: ICD-10-CM

## 2022-01-18 DIAGNOSIS — E55.9 VITAMIN D INSUFFICIENCY: ICD-10-CM

## 2022-01-18 PROCEDURE — 90471 IMMUNIZATION ADMIN: CPT | Performed by: NURSE PRACTITIONER

## 2022-01-18 PROCEDURE — 99396 PREV VISIT EST AGE 40-64: CPT | Performed by: NURSE PRACTITIONER

## 2022-01-18 PROCEDURE — 90715 TDAP VACCINE 7 YRS/> IM: CPT | Performed by: NURSE PRACTITIONER

## 2022-01-18 NOTE — ASSESSMENT & PLAN NOTE
She is currently taking over-the-counter iron supplement.  We will recheck her iron levels with her next labs.

## 2022-01-18 NOTE — PROGRESS NOTES
"Chief Complaint  Annual Exam    Subjective          Leola Montana presents to [facility]  History of Present Illness   56-year-old female presents today for an annual physical.  She would like to have some routine labs.  She does have a history of a thyroid nodule, follows with ENT.    She states her last Tdap was in 2011.    Her last mammogram was on 3/4/2021.    She states she needs a referral to an optometrist.    Vitamin D insufficiency: She is currently on vitamin D 2000 units daily for maintenance.    Iron deficiency anemia: Her last iron levels were just mildly low and she is taking an over-the-counter iron tablet.  Objective   Vital Signs:   /70   Pulse 66   Temp 98 °F (36.7 °C)   Ht 165.1 cm (65\")   Wt 66.1 kg (145 lb 12.8 oz)   SpO2 98%   BMI 24.26 kg/m²     Physical Exam  Vitals reviewed.   Constitutional:       Appearance: Normal appearance. She is well-developed.   Neck:      Thyroid: No thyroid mass, thyromegaly or thyroid tenderness.   Cardiovascular:      Rate and Rhythm: Normal rate and regular rhythm.      Heart sounds: No murmur heard.  No friction rub. No gallop.    Pulmonary:      Effort: Pulmonary effort is normal.      Breath sounds: Normal breath sounds. No wheezing or rhonchi.   Lymphadenopathy:      Cervical: No cervical adenopathy.   Skin:     General: Skin is warm and dry.   Neurological:      Mental Status: She is alert and oriented to person, place, and time.      Cranial Nerves: No cranial nerve deficit.   Psychiatric:         Mood and Affect: Mood and affect normal.         Behavior: Behavior normal.         Thought Content: Thought content normal. Thought content does not include homicidal or suicidal ideation.         Judgment: Judgment normal.        Result Review :                 Assessment and Plan    Diagnoses and all orders for this visit:    1. Annual physical exam (Primary)  -     CBC Auto Differential; Future  -     Comprehensive " Metabolic Panel; Future  -     Lipid Panel; Future  -     TSH+Free T4; Future  We discussed annual mammograms, Pap smears every 2 to 3 years, Tdap every 10 years.  2. Encounter for screening mammogram for malignant neoplasm of breast  -     Mammo Screening Digital Tomosynthesis Bilateral With CAD; Future    3. Need for Tdap vaccination  Comments:  Tdap given today.    4. Eye exam, routine  -     Ambulatory Referral to Optometry    5. Thyroid nodule  Comments:  We will plan to check her thyroid levels with her next labs.  Orders:  -     TSH+Free T4; Future    6. Vitamin D insufficiency  Assessment & Plan:  We will recheck her vitamin D level with her next labs.    Orders:  -     Vitamin D 25 Hydroxy; Future    7. Iron deficiency  Assessment & Plan:  She is currently taking over-the-counter iron supplement.  We will recheck her iron levels with her next labs.    Orders:  -     CBC Auto Differential; Future  -     Iron Profile; Future  -     Ferritin; Future    Other orders  -     Tdap Vaccine Greater Than or Equal To 6yo IM  Patient states she will return another day for fasting labs.    Follow Up   Return in about 1 year (around 1/18/2023) for Annual physical.  Patient was given instructions and counseling regarding her condition or for health maintenance advice. Please see specific information pulled into the AVS if appropriate.

## 2022-01-20 ENCOUNTER — LAB (OUTPATIENT)
Dept: FAMILY MEDICINE CLINIC | Facility: CLINIC | Age: 57
End: 2022-01-20

## 2022-01-20 DIAGNOSIS — E55.9 VITAMIN D INSUFFICIENCY: ICD-10-CM

## 2022-01-20 DIAGNOSIS — E04.1 THYROID NODULE: ICD-10-CM

## 2022-01-20 DIAGNOSIS — Z00.00 ANNUAL PHYSICAL EXAM: ICD-10-CM

## 2022-01-20 DIAGNOSIS — E61.1 IRON DEFICIENCY: ICD-10-CM

## 2022-01-20 LAB
25(OH)D3 SERPL-MCNC: 34.2 NG/ML
ALBUMIN SERPL-MCNC: 4.4 G/DL (ref 3.5–5.2)
ALBUMIN/GLOB SERPL: 1.4 G/DL
ALP SERPL-CCNC: 62 U/L (ref 39–117)
ALT SERPL W P-5'-P-CCNC: 18 U/L (ref 1–33)
ANION GAP SERPL CALCULATED.3IONS-SCNC: 8 MMOL/L (ref 5–15)
AST SERPL-CCNC: 23 U/L (ref 1–32)
BASOPHILS # BLD AUTO: 0.02 10*3/MM3 (ref 0–0.2)
BASOPHILS NFR BLD AUTO: 0.3 % (ref 0–1.5)
BILIRUB SERPL-MCNC: 0.5 MG/DL (ref 0–1.2)
BUN SERPL-MCNC: 16 MG/DL (ref 6–20)
BUN/CREAT SERPL: 26.7 (ref 7–25)
CALCIUM SPEC-SCNC: 9.4 MG/DL (ref 8.6–10.5)
CHLORIDE SERPL-SCNC: 102 MMOL/L (ref 98–107)
CHOLEST SERPL-MCNC: 187 MG/DL (ref 0–200)
CO2 SERPL-SCNC: 27 MMOL/L (ref 22–29)
CREAT SERPL-MCNC: 0.6 MG/DL (ref 0.57–1)
DEPRECATED RDW RBC AUTO: 42.6 FL (ref 37–54)
EOSINOPHIL # BLD AUTO: 0.17 10*3/MM3 (ref 0–0.4)
EOSINOPHIL NFR BLD AUTO: 2.8 % (ref 0.3–6.2)
ERYTHROCYTE [DISTWIDTH] IN BLOOD BY AUTOMATED COUNT: 13.3 % (ref 12.3–15.4)
FERRITIN SERPL-MCNC: 75.2 NG/ML (ref 13–150)
GFR SERPL CREATININE-BSD FRML MDRD: 103 ML/MIN/1.73
GLOBULIN UR ELPH-MCNC: 3.1 GM/DL
GLUCOSE SERPL-MCNC: 88 MG/DL (ref 65–99)
HCT VFR BLD AUTO: 41.1 % (ref 34–46.6)
HDLC SERPL-MCNC: 63 MG/DL (ref 40–60)
HGB BLD-MCNC: 13.9 G/DL (ref 12–15.9)
IMM GRANULOCYTES # BLD AUTO: 0.01 10*3/MM3 (ref 0–0.05)
IMM GRANULOCYTES NFR BLD AUTO: 0.2 % (ref 0–0.5)
IRON 24H UR-MRATE: 101 MCG/DL (ref 37–145)
IRON SATN MFR SERPL: 24 % (ref 20–50)
LDLC SERPL CALC-MCNC: 108 MG/DL (ref 0–100)
LDLC/HDLC SERPL: 1.7 {RATIO}
LYMPHOCYTES # BLD AUTO: 2.03 10*3/MM3 (ref 0.7–3.1)
LYMPHOCYTES NFR BLD AUTO: 34 % (ref 19.6–45.3)
MCH RBC QN AUTO: 29.8 PG (ref 26.6–33)
MCHC RBC AUTO-ENTMCNC: 33.8 G/DL (ref 31.5–35.7)
MCV RBC AUTO: 88 FL (ref 79–97)
MONOCYTES # BLD AUTO: 0.53 10*3/MM3 (ref 0.1–0.9)
MONOCYTES NFR BLD AUTO: 8.9 % (ref 5–12)
NEUTROPHILS NFR BLD AUTO: 3.21 10*3/MM3 (ref 1.7–7)
NEUTROPHILS NFR BLD AUTO: 53.8 % (ref 42.7–76)
NRBC BLD AUTO-RTO: 0 /100 WBC (ref 0–0.2)
PLATELET # BLD AUTO: 370 10*3/MM3 (ref 140–450)
PMV BLD AUTO: 10 FL (ref 6–12)
POTASSIUM SERPL-SCNC: 4.1 MMOL/L (ref 3.5–5.2)
PROT SERPL-MCNC: 7.5 G/DL (ref 6–8.5)
RBC # BLD AUTO: 4.67 10*6/MM3 (ref 3.77–5.28)
SODIUM SERPL-SCNC: 137 MMOL/L (ref 136–145)
T4 FREE SERPL-MCNC: 1.29 NG/DL (ref 0.93–1.7)
TIBC SERPL-MCNC: 419 MCG/DL (ref 298–536)
TRANSFERRIN SERPL-MCNC: 281 MG/DL (ref 200–360)
TRIGL SERPL-MCNC: 86 MG/DL (ref 0–150)
TSH SERPL DL<=0.05 MIU/L-ACNC: 1 UIU/ML (ref 0.27–4.2)
VLDLC SERPL-MCNC: 16 MG/DL (ref 5–40)
WBC NRBC COR # BLD: 5.97 10*3/MM3 (ref 3.4–10.8)

## 2022-01-20 PROCEDURE — 85025 COMPLETE CBC W/AUTO DIFF WBC: CPT | Performed by: NURSE PRACTITIONER

## 2022-01-20 PROCEDURE — 36415 COLL VENOUS BLD VENIPUNCTURE: CPT | Performed by: NURSE PRACTITIONER

## 2022-01-20 PROCEDURE — 80053 COMPREHEN METABOLIC PANEL: CPT | Performed by: NURSE PRACTITIONER

## 2022-01-20 PROCEDURE — 84443 ASSAY THYROID STIM HORMONE: CPT | Performed by: NURSE PRACTITIONER

## 2022-01-20 PROCEDURE — 84439 ASSAY OF FREE THYROXINE: CPT | Performed by: NURSE PRACTITIONER

## 2022-01-20 PROCEDURE — 82728 ASSAY OF FERRITIN: CPT | Performed by: NURSE PRACTITIONER

## 2022-01-20 PROCEDURE — 83540 ASSAY OF IRON: CPT | Performed by: NURSE PRACTITIONER

## 2022-01-20 PROCEDURE — 80061 LIPID PANEL: CPT | Performed by: NURSE PRACTITIONER

## 2022-01-20 PROCEDURE — 82306 VITAMIN D 25 HYDROXY: CPT | Performed by: NURSE PRACTITIONER

## 2022-01-20 PROCEDURE — 84466 ASSAY OF TRANSFERRIN: CPT | Performed by: NURSE PRACTITIONER

## 2022-03-08 ENCOUNTER — HOSPITAL ENCOUNTER (OUTPATIENT)
Dept: MAMMOGRAPHY | Facility: HOSPITAL | Age: 57
Discharge: HOME | End: 2022-03-08
Admitting: NURSE PRACTITIONER

## 2022-03-08 DIAGNOSIS — Z12.31 ENCOUNTER FOR SCREENING MAMMOGRAM FOR MALIGNANT NEOPLASM OF BREAST: ICD-10-CM

## 2022-03-08 PROCEDURE — 77063 BREAST TOMOSYNTHESIS BI: CPT

## 2022-03-08 PROCEDURE — 77067 SCR MAMMO BI INCL CAD: CPT

## 2022-04-18 RX ORDER — FLUTICASONE PROPIONATE 50 MCG
2 SPRAY, SUSPENSION (ML) NASAL DAILY
Qty: 15.8 ML | Refills: 5 | Status: SHIPPED | OUTPATIENT
Start: 2022-04-18 | End: 2022-04-19 | Stop reason: SDUPTHER

## 2022-04-18 RX ORDER — IBUPROFEN 600 MG/1
600 TABLET ORAL EVERY 6 HOURS PRN
COMMUNITY
End: 2022-04-18 | Stop reason: SDUPTHER

## 2022-04-18 RX ORDER — IBUPROFEN 600 MG/1
600 TABLET ORAL EVERY 6 HOURS PRN
Qty: 90 TABLET | Refills: 2 | Status: SHIPPED | OUTPATIENT
Start: 2022-04-18 | End: 2022-04-19 | Stop reason: SDUPTHER

## 2022-04-18 RX ORDER — FLUTICASONE PROPIONATE 50 MCG
2 SPRAY, SUSPENSION (ML) NASAL DAILY
COMMUNITY
End: 2022-04-18 | Stop reason: SDUPTHER

## 2022-04-18 NOTE — TELEPHONE ENCOUNTER
Patient is requesting refills on the following prescriptions  Fluticasone propionate nasal spray, 50 mcg   Ibuprofen 600   allergy meds cleariton

## 2022-04-19 ENCOUNTER — TELEPHONE (OUTPATIENT)
Dept: FAMILY MEDICINE CLINIC | Facility: CLINIC | Age: 57
End: 2022-04-19

## 2022-04-19 RX ORDER — IBUPROFEN 600 MG/1
600 TABLET ORAL EVERY 6 HOURS PRN
Qty: 90 TABLET | Refills: 2 | Status: SHIPPED | OUTPATIENT
Start: 2022-04-19 | End: 2023-02-13 | Stop reason: SDUPTHER

## 2022-04-19 RX ORDER — FLUTICASONE PROPIONATE 50 MCG
2 SPRAY, SUSPENSION (ML) NASAL DAILY
Qty: 15.8 ML | Refills: 5 | Status: SHIPPED | OUTPATIENT
Start: 2022-04-19 | End: 2023-02-13 | Stop reason: SDUPTHER

## 2022-04-19 NOTE — TELEPHONE ENCOUNTER
Mrs. Montana came in today saying that her medication has gone to the wrong pharmacy. She is wanting it to go to the [place] pharmacy on [address]. Please call the pt when this order has been placed.     Ibuprofen and Flonase     Pt is also asking for a refill on her Flonase

## 2022-10-03 ENCOUNTER — HOSPITAL ENCOUNTER (OUTPATIENT)
Dept: ULTRASOUND IMAGING | Facility: HOSPITAL | Age: 57
Discharge: HOME | End: 2022-10-03
Admitting: NURSE PRACTITIONER

## 2022-10-03 DIAGNOSIS — E04.2 MULTINODULAR THYROID: ICD-10-CM

## 2022-10-03 PROCEDURE — 76536 US EXAM OF HEAD AND NECK: CPT

## 2022-10-21 ENCOUNTER — OFFICE VISIT (OUTPATIENT)
Dept: OTOLARYNGOLOGY | Facility: CLINIC | Age: 57
End: 2022-10-21

## 2022-10-21 VITALS — WEIGHT: 147 LBS | TEMPERATURE: 97.4 F | HEIGHT: 65 IN | BODY MASS INDEX: 24.49 KG/M2

## 2022-10-21 DIAGNOSIS — E04.2 MULTINODULAR THYROID: Primary | ICD-10-CM

## 2022-10-21 PROCEDURE — 99213 OFFICE O/P EST LOW 20 MIN: CPT | Performed by: NURSE PRACTITIONER

## 2022-10-21 RX ORDER — MULTIPLE VITAMINS W/ MINERALS TAB 9MG-400MCG
1 TAB ORAL EVERY OTHER DAY
COMMUNITY

## 2022-10-21 NOTE — PROGRESS NOTES
Patient Name: Leola Montana   Visit Date: 10/21/2022   Patient ID: [id number]  Provider: CHEYANNE Subramanian    Sex: female  [place] [facility]   YOB: 1965  Location Address: [address] [place] [address], [address],?[place]?[address]    Primary Care Provider Elissa Miller APRN  Location Phone: [phone]    Referring Provider: Vasyl Galindo DO        Chief Complaint  Follow-up (1 yr follow up repeat thyroid US )    Subjective          Leola Montana is a 56 y.o. female who presents to [facility] for a follow-up visit of  1 year follow-up visit for multinodular thyroid.  Patient states she has done well last year.  She denies any dysphagia or compressive symptoms.  She did recently have her thyroid ultrasound which revealed for nodules.  Nodule 1 is a right-sided thyroid nodule measuring 8 mm that was stable from the prior exam.  Nodule 2 was also a right-sided thyroid nodule that was 6 mm and stable from a prior exam.  Nodule 3 was a left-sided thyroid nodule 1.2 cm, solid, hypoechoic, wider than tall with a lobular margin and macrocalcifications.  This is an increase in size from the previous exam 1 year ago and labeled as a T RADS 5 nodule with recommendation for fine-needle aspiration.  Nodule 4 was a left-sided thyroid nodule measuring 4 mm, classified as TI-RADS 4 and not imaged previously.        Current Outpatient Medications on File Prior to Visit   Medication Sig   • ibuprofen (ADVIL,MOTRIN) 600 MG tablet Take 1 tablet by mouth Every 6 (Six) Hours As Needed (every 6 to 8 hours as needed for pain.).   • multivitamin with minerals (Womens Multivitamin) tablet tablet Take 1 tablet by mouth Every Other Day.   • Cholecalciferol 50 MCG (2000 UT) capsule Take 2,000 Units by mouth Daily.   • Ferrous Sulfate (Iron) 28 MG tablet Take 1 tablet by mouth Daily.   • fluticasone (FLONASE) 50 MCG/ACT nasal spray 2 sprays into the nostril(s) as  "directed by provider Daily.     No current facility-administered medications on file prior to visit.        Social History     Tobacco Use   • Smoking status: Former     Packs/day: 0.20     Years: 5.00     Pack years: 1.00     Types: Cigarettes     Quit date:      Years since quittin.8   • Smokeless tobacco: Never   • Tobacco comments:     smoked 5 years, quite 30 years ago   Vaping Use   • Vaping Use: Never used   Substance Use Topics   • Alcohol use: Not Currently     Comment: occasionally drinks    • Drug use: Never        Objective     Vital Signs:   Temp 97.4 °F (36.3 °C) (Tympanic)   Ht 165.1 cm (65\")   Wt 66.7 kg (147 lb)   BMI 24.46 kg/m²       Physical Exam  Constitutional:       General: She is not in acute distress.     Appearance: Normal appearance. She is not ill-appearing.   HENT:      Head: Normocephalic and atraumatic.      Jaw: There is normal jaw occlusion. No tenderness or pain on movement.      Salivary Glands: Right salivary gland is not diffusely enlarged or tender. Left salivary gland is not diffusely enlarged or tender.      Right Ear: Tympanic membrane, ear canal and external ear normal.      Left Ear: Tympanic membrane, ear canal and external ear normal.      Nose: Nose normal. No septal deviation.      Right Sinus: No maxillary sinus tenderness or frontal sinus tenderness.      Left Sinus: No maxillary sinus tenderness or frontal sinus tenderness.      Mouth/Throat:      Lips: Pink. No lesions.      Mouth: Mucous membranes are moist. No oral lesions.      Dentition: Normal dentition.      Tongue: No lesions.      Palate: No mass and lesions.      Pharynx: Oropharynx is clear. Uvula midline.      Tonsils: No tonsillar exudate.   Eyes:      Extraocular Movements: Extraocular movements intact.      Conjunctiva/sclera: Conjunctivae normal.      Pupils: Pupils are equal, round, and reactive to light.   Neck:      Thyroid: No thyroid mass, thyromegaly or thyroid tenderness.      " Trachea: Trachea normal.   Pulmonary:      Effort: Pulmonary effort is normal. No respiratory distress.   Musculoskeletal:         General: Normal range of motion.      Cervical back: Normal range of motion and neck supple. No tenderness.   Lymphadenopathy:      Cervical: No cervical adenopathy.   Skin:     General: Skin is warm and dry.   Neurological:      General: No focal deficit present.      Mental Status: She is alert and oriented to person, place, and time.      Cranial Nerves: No cranial nerve deficit.      Motor: No weakness.      Gait: Gait normal.   Psychiatric:         Mood and Affect: Mood normal.         Behavior: Behavior normal.         Thought Content: Thought content normal.         Judgment: Judgment normal.                Result Review :          US Thyroid (09/27/2021 14:12)  US Thyroid (10/03/2022 12:37)  See HPI     Assessment and Plan    Diagnoses and all orders for this visit:    1. Multinodular thyroid (Primary)  -     US Guided Thyroid Biopsy; Future    Other orders  -     No Lab Testing Needed; Standing    Patient is agreeable to FNA biopsy.  I will get this scheduled for the left-sided thyroid nodule and see her back afterwards for follow-up.       Follow Up   Return in about 4 weeks (around 11/18/2022).  Patient was given instructions and counseling regarding her condition or for health maintenance advice. Please see specific information pulled into the AVS if appropriate.     CHEYANNE Subramanian

## 2022-11-04 ENCOUNTER — HOSPITAL ENCOUNTER (OUTPATIENT)
Dept: ULTRASOUND IMAGING | Facility: HOSPITAL | Age: 57
Discharge: HOME | End: 2022-11-04
Admitting: NURSE PRACTITIONER

## 2022-11-04 DIAGNOSIS — E04.2 MULTINODULAR THYROID: ICD-10-CM

## 2022-11-04 PROCEDURE — 76942 ECHO GUIDE FOR BIOPSY: CPT

## 2022-11-04 PROCEDURE — 0 LIDOCAINE 1 % SOLUTION: Performed by: NURSE PRACTITIONER

## 2022-11-04 PROCEDURE — 88173 CYTOPATH EVAL FNA REPORT: CPT | Performed by: NURSE PRACTITIONER

## 2022-11-04 RX ORDER — LIDOCAINE HYDROCHLORIDE 10 MG/ML
10 INJECTION, SOLUTION INFILTRATION; PERINEURAL ONCE
Status: COMPLETED | OUTPATIENT
Start: 2022-11-04 | End: 2022-11-04

## 2022-11-04 RX ADMIN — LIDOCAINE HYDROCHLORIDE 10 ML: 10 INJECTION, SOLUTION INFILTRATION; PERINEURAL at 10:37

## 2022-11-09 LAB
CYTO UR: NORMAL
LAB AP CASE REPORT: NORMAL
LAB AP CLINICAL INFORMATION: NORMAL
LAB AP NON-GYN SPECIMEN ADEQUACY: NORMAL
PATH REPORT.FINAL DX SPEC: NORMAL
PATH REPORT.GROSS SPEC: NORMAL

## 2022-11-15 ENCOUNTER — OFFICE VISIT (OUTPATIENT)
Dept: OTOLARYNGOLOGY | Facility: CLINIC | Age: 57
End: 2022-11-15

## 2022-11-15 VITALS — BODY MASS INDEX: 24.59 KG/M2 | TEMPERATURE: 97.3 F | HEIGHT: 65 IN | WEIGHT: 147.6 LBS

## 2022-11-15 DIAGNOSIS — E04.2 MULTINODULAR THYROID: Primary | ICD-10-CM

## 2022-11-15 DIAGNOSIS — E04.1 COLLOID THYROID NODULE: ICD-10-CM

## 2022-11-15 PROCEDURE — 99213 OFFICE O/P EST LOW 20 MIN: CPT | Performed by: NURSE PRACTITIONER

## 2022-11-15 NOTE — PROGRESS NOTES
Patient Name: Leola Montana   Visit Date: 11/15/2022   Patient ID: [id number]  Provider: CHEYANNE Subramanian    Sex: female  [place] [facility]   YOB: 1965  Location Address: [address] [place] [address], [address] [place] [address],?[place]?[id number]    Primary Care Provider Elissa Miller APRN  Location Phone: [phone]    Referring Provider: No ref. provider found        Chief Complaint  Results of FNA    Subjective          Leola Montana is a 56 y.o. female who presents to [facility] for a follow-up visit of multinodular thyroid.  She did recently have an FNA biopsy performed of the left-sided thyroid nodule that was measuring 1.2 cm, solid, hypoechoic, wider than tall with a lobulated margin and microcalcifications labeled as a T RADS 5 nodule.  This was performed on 11/4/2022 and final pathologic diagnosis was colloid nodule, [place] category 2: Benign.    10.21.22  Leola Montana is a 56 y.o. female who presents to [facility] for a follow-up visit of  1 year follow-up visit for multinodular thyroid.  Patient states she has done well last year.  She denies any dysphagia or compressive symptoms.  She did recently have her thyroid ultrasound which revealed for nodules.  Nodule 1 is a right-sided thyroid nodule measuring 8 mm that was stable from the prior exam.  Nodule 2 was also a right-sided thyroid nodule that was 6 mm and stable from a prior exam.  Nodule 3 was a left-sided thyroid nodule 1.2 cm, solid, hypoechoic, wider than tall with a lobular margin and macrocalcifications.  This is an increase in size from the previous exam 1 year ago and labeled as a T RADS 5 nodule with recommendation for fine-needle aspiration.  Nodule 4 was a left-sided thyroid nodule measuring 4 mm, classified as TI-RADS 4 and not imaged previously.         Current Outpatient Medications on File Prior to Visit   Medication Sig   •  "Cholecalciferol 50 MCG (2000 UT) capsule Take 2,000 Units by mouth Daily.   • Ferrous Sulfate (Iron) 28 MG tablet Take 1 tablet by mouth Daily.   • fluticasone (FLONASE) 50 MCG/ACT nasal spray 2 sprays into the nostril(s) as directed by provider Daily.   • ibuprofen (ADVIL,MOTRIN) 600 MG tablet Take 1 tablet by mouth Every 6 (Six) Hours As Needed (every 6 to 8 hours as needed for pain.).   • multivitamin with minerals tablet tablet Take 1 tablet by mouth Every Other Day.     No current facility-administered medications on file prior to visit.        Social History     Tobacco Use   • Smoking status: Former     Packs/day: 0.20     Years: 5.00     Pack years: 1.00     Types: Cigarettes     Quit date:      Years since quittin.8   • Smokeless tobacco: Never   • Tobacco comments:     smoked 5 years, quite 30 years ago   Vaping Use   • Vaping Use: Never used   Substance Use Topics   • Alcohol use: Not Currently     Comment: occasionally drinks    • Drug use: Never        Objective     Vital Signs:   Temp 97.3 °F (36.3 °C) (Temporal)   Ht 165.1 cm (65\")   Wt 67 kg (147 lb 9.6 oz)   BMI 24.56 kg/m²       Physical Exam  Constitutional:       General: She is not in acute distress.     Appearance: Normal appearance. She is not ill-appearing.   HENT:      Head: Normocephalic and atraumatic.      Jaw: There is normal jaw occlusion. No tenderness or pain on movement.      Salivary Glands: Right salivary gland is not diffusely enlarged or tender. Left salivary gland is not diffusely enlarged or tender.      Right Ear: Tympanic membrane, ear canal and external ear normal.      Left Ear: Tympanic membrane, ear canal and external ear normal.      Nose: Nose normal. No septal deviation.      Right Sinus: No maxillary sinus tenderness or frontal sinus tenderness.      Left Sinus: No maxillary sinus tenderness or frontal sinus tenderness.      Mouth/Throat:      Lips: Pink. No lesions.      Mouth: Mucous membranes are moist. " No oral lesions.      Dentition: Normal dentition.      Tongue: No lesions.      Palate: No mass and lesions.      Pharynx: Oropharynx is clear. Uvula midline.      Tonsils: No tonsillar exudate.   Eyes:      Extraocular Movements: Extraocular movements intact.      Conjunctiva/sclera: Conjunctivae normal.      Pupils: Pupils are equal, round, and reactive to light.   Neck:      Thyroid: No thyroid mass, thyromegaly or thyroid tenderness.      Trachea: Trachea normal.   Pulmonary:      Effort: Pulmonary effort is normal. No respiratory distress.   Musculoskeletal:         General: Normal range of motion.      Cervical back: Normal range of motion and neck supple. No tenderness.   Lymphadenopathy:      Cervical: No cervical adenopathy.   Skin:     General: Skin is warm and dry.   Neurological:      General: No focal deficit present.      Mental Status: She is alert and oriented to person, place, and time.      Cranial Nerves: No cranial nerve deficit.      Motor: No weakness.      Gait: Gait normal.   Psychiatric:         Mood and Affect: Mood normal.         Behavior: Behavior normal.         Thought Content: Thought content normal.         Judgment: Judgment normal.                Result Review :          Fine Needle Aspiration (11/04/2022 10:54)  Reviewed, but this category 2: Benign     Assessment and Plan    Diagnoses and all orders for this visit:    1. Multinodular thyroid (Primary)  -     US Thyroid; Future    2. Colloid thyroid nodule    Left-sided thyroid nodule was determined to be a benign colloid nodule.  I have reassured the patient and gone over the results of the biopsy.  I would like to see her back in 1 year after a repeat thyroid ultrasound to keep an eye on her other thyroid nodules.       Follow Up   No follow-ups on file.  Patient was given instructions and counseling regarding her condition or for health maintenance advice. Please see specific information pulled into the AVS if appropriate.      Karla Sheehan, APRN

## 2023-02-13 ENCOUNTER — OFFICE VISIT (OUTPATIENT)
Dept: FAMILY MEDICINE CLINIC | Facility: CLINIC | Age: 58
End: 2023-02-13
Payer: OTHER GOVERNMENT

## 2023-02-13 VITALS
TEMPERATURE: 97 F | BODY MASS INDEX: 24.83 KG/M2 | HEIGHT: 65 IN | WEIGHT: 149 LBS | HEART RATE: 84 BPM | OXYGEN SATURATION: 97 % | DIASTOLIC BLOOD PRESSURE: 72 MMHG | SYSTOLIC BLOOD PRESSURE: 112 MMHG

## 2023-02-13 DIAGNOSIS — E55.9 VITAMIN D DEFICIENCY: ICD-10-CM

## 2023-02-13 DIAGNOSIS — Z13.220 LIPID SCREENING: ICD-10-CM

## 2023-02-13 DIAGNOSIS — N95.1 MENOPAUSAL SYMPTOM: ICD-10-CM

## 2023-02-13 DIAGNOSIS — Z12.31 ENCOUNTER FOR SCREENING MAMMOGRAM FOR MALIGNANT NEOPLASM OF BREAST: ICD-10-CM

## 2023-02-13 DIAGNOSIS — Z13.29 THYROID DISORDER SCREENING: ICD-10-CM

## 2023-02-13 DIAGNOSIS — Z00.00 ANNUAL PHYSICAL EXAM: Primary | ICD-10-CM

## 2023-02-13 DIAGNOSIS — D50.9 IRON DEFICIENCY ANEMIA, UNSPECIFIED IRON DEFICIENCY ANEMIA TYPE: ICD-10-CM

## 2023-02-13 PROCEDURE — 99396 PREV VISIT EST AGE 40-64: CPT | Performed by: NURSE PRACTITIONER

## 2023-02-13 RX ORDER — IBUPROFEN 600 MG/1
600 TABLET ORAL EVERY 6 HOURS PRN
Qty: 90 TABLET | Refills: 2 | Status: SHIPPED | OUTPATIENT
Start: 2023-02-13

## 2023-02-13 RX ORDER — FLUTICASONE PROPIONATE 50 MCG
2 SPRAY, SUSPENSION (ML) NASAL DAILY PRN
Qty: 15 ML | Refills: 4 | Status: SHIPPED | OUTPATIENT
Start: 2023-02-13

## 2023-02-13 NOTE — PROGRESS NOTES
"  ENCOUNTER DATE:  02/13/2023    Leola Graves / 57 y.o. / female      CHIEF COMPLAINT     Establish Care (New patient, PCP transfer of care), Pap Smear Screening Request, Mammogram Screening, and Annual Exam      VITALS     Visit Vitals  /72 (BP Location: Right arm, Patient Position: Sitting, Cuff Size: Adult)   Pulse 84   Temp 97 °F (36.1 °C) (Temporal)   Ht 165.1 cm (65\")   Wt 67.6 kg (149 lb)   SpO2 97%   BMI 24.79 kg/m²       BP Readings from Last 3 Encounters:   02/13/23 112/72   01/18/22 120/70   11/05/21 144/71     Wt Readings from Last 3 Encounters:   02/13/23 67.6 kg (149 lb)   11/15/22 67 kg (147 lb 9.6 oz)   10/21/22 66.7 kg (147 lb)      Body mass index is 24.79 kg/m².    MEDICATIONS     Current Outpatient Medications on File Prior to Visit   Medication Sig Dispense Refill   • multivitamin with minerals tablet tablet Take 1 tablet by mouth Every Other Day.     • [DISCONTINUED] fluticasone (FLONASE) 50 MCG/ACT nasal spray 2 sprays into the nostril(s) as directed by provider Daily. (Patient taking differently: 2 sprays into the nostril(s) as directed by provider As Needed.) 15.8 mL 5   • [DISCONTINUED] ibuprofen (ADVIL,MOTRIN) 600 MG tablet Take 1 tablet by mouth Every 6 (Six) Hours As Needed (every 6 to 8 hours as needed for pain.). 90 tablet 2   • Cholecalciferol 50 MCG (2000 UT) capsule Take 2,000 Units by mouth Daily.     • Ferrous Sulfate (Iron) 28 MG tablet Take 1 tablet by mouth Daily.       No current facility-administered medications on file prior to visit.         HISTORY OF PRESENT ILLNESS     Leola presents for annual health maintenance visit.  No results found for: HPVAPTIMA   · Last health maintenance visit: approximately 1 year ago  · General health: good  · Lifestyle:  · Attempting to lose weight?: Yes   · Diet: eats moderately healthy  · Exercise: exercises nearly every day  · Tobacco: Former smoker   · Alcohol: does not drink  · Work: Retired  · Reproductive health:  · Sexually " active?: Yes   · Sexual problems?: No problems  · Concern for STD?: No    · Sees Gynecologist?: Yes   · Magi/Postmenopausal?: Yes   · Domestic abuse concerns: No   · Depression Screening:          PHQ-9 Depression Screening  Little interest or pleasure in doing things? 0-->not at all   Feeling down, depressed, or hopeless? 0-->not at all   Trouble falling or staying asleep, or sleeping too much?     Feeling tired or having little energy?     Poor appetite or overeating?     Feeling bad about yourself - or that you are a failure or have let yourself or your family down?     Trouble concentrating on things, such as reading the newspaper or watching television?     Moving or speaking so slowly that other people could have noticed? Or the opposite - being so fidgety or restless that you have been moving around a lot more than usual?     Thoughts that you would be better off dead, or of hurting yourself in some way?     PHQ-9 Total Score 0   If you checked off any problems, how difficult have these problems made it for you to do your work, take care of things at home, or get along with other people?                Patient Care Team:  Elissa Miller APRN as PCP - General (Nurse Practitioner)  Karla Sheehan APRN as Nurse Practitioner (Otolaryngology)      ALLERGIES  No Known Allergies     PFSH:     The following portions of the patient's history were reviewed and updated as appropriate: Allergies / Current Medications / Past Medical History / Surgical History / Social History / Family History    PROBLEM LIST   Patient Active Problem List   Diagnosis   • Arthritis   • Facial aging   • Gastrointestinal malfunction arising from mental factors   • Headache   • Lipoma   • Menopausal symptoms   • Neural foraminal stenosis of cervical spine   • Right hip pain   • Thyroid nodule   • Varicose veins of left lower extremity with pain   • Lipoma of right lower extremity   • Closed displaced fracture of right patella   • S/P  Right Patella Open Reduction Internal Fixation   • Aftercare following surgery of right patella ORIF, 2021   • Leiomyoma   • Thyroid nodule   • Thyroid nodule   • It band syndrome, right   • Iron deficiency   • Vitamin D insufficiency       PAST MEDICAL HISTORY  Past Medical History:   Diagnosis Date   • Arthritis    • Cervical pain (neck) 2020   • Headache    • Leiomyoma     21 fibroid x2, 4.3cm subserosal and 3.8cm lat/post   • Lipoma of right upper extremity 2021    hip/side   • Neural foraminal stenosis of cervical spine 2020   • Right hip pain 2021   • Right patella fracture CURRENTLY   • Thyroid nodule    • Varicose veins of lower extremity with pain, left 2020       SURGICAL HISTORY  Past Surgical History:   Procedure Laterality Date   • GALLBLADDER SURGERY     • KNEE SURGERY     • LIPOMA EXCISION      neck 10 yrs ago   • PATELLA OPEN REDUCTION INTERNAL FIXATION Right 2021    Procedure: PATELLA OPEN REDUCTION INTERNAL FIXATION;  Surgeon: Scot Cuevas MD;  Location: [facility] MAIN OR;  Service: Orthopedics;  Laterality: Right;   • SINUS SURGERY         SOCIAL HISTORY  Social History     Socioeconomic History   • Marital status:    Tobacco Use   • Smoking status: Former     Packs/day: 0.20     Years: 5.00     Pack years: 1.00     Types: Cigarettes     Quit date:      Years since quittin.1   • Smokeless tobacco: Never   • Tobacco comments:     smoked 5 years, quite 30 years ago   Vaping Use   • Vaping Use: Never used   Substance and Sexual Activity   • Alcohol use: Not Currently     Comment: occasionally drinks    • Drug use: Never   • Sexual activity: Defer       FAMILY HISTORY  Family History   Problem Relation Age of Onset   • Alzheimer's disease Mother    • Prostate cancer Father    • Lung cancer Father    • Breast cancer Other        IMMUNIZATION HISTORY  Immunization History   Administered Date(s) Administered   • COVID-19 (PFIZER)  BIVALENT BOOSTER 12+YRS 11/05/2022   • COVID-19 (PFIZER) PURPLE CAP 03/22/2021, 03/22/2021, 04/08/2021, 04/12/2021, 11/10/2021   • Flu Vaccine Intradermal Quad 18-64YR 09/01/2020   • Flu Vaccine Quad PF >36MO 09/21/2018, 09/26/2020, 09/20/2021   • Influenza, Unspecified 09/09/2022   • Shingrix 01/22/2020, 01/22/2020, 03/27/2020, 03/27/2020   • Tdap 01/18/2022         Anemia  Presents for follow-up visit. There has been no anorexia, confusion, leg swelling, malaise/fatigue, palpitations or weight loss. Signs of blood loss that are not present include hematemesis and hematochezia. There are no compliance problems.  Compliance with medications is %.     Vitamin D deficiency  This is a chronic problem.  Current episode has been for longer than 6 months.  Problem has gradually been improving since she has been on vitamin D supplements.  She denies any excessive fatigue, hair loss, skin changes due to the vitamin D deficiency.  She tries to eat a well-balanced diet.  She has been on the vitamin D weekly, no compliance problems.  Condition is stable.      Physical Exam  Vitals reviewed.   Constitutional:       Appearance: Normal appearance. She is well-developed.   HENT:      Head: Normocephalic and atraumatic.   Eyes:      Conjunctiva/sclera: Conjunctivae normal.      Pupils: Pupils are equal, round, and reactive to light.   Cardiovascular:      Rate and Rhythm: Normal rate and regular rhythm.      Heart sounds: No murmur heard.  Pulmonary:      Effort: Pulmonary effort is normal.      Breath sounds: Normal breath sounds. No wheezing or rhonchi.   Skin:     General: Skin is warm and dry.   Neurological:      Mental Status: She is alert and oriented to person, place, and time.   Psychiatric:         Mood and Affect: Mood and affect normal.         Behavior: Behavior normal.         Thought Content: Thought content normal.         Judgment: Judgment normal.         REVIEWED DATA      Labs:    Lab Results   Component  Value Date     01/20/2022    K 4.1 01/20/2022    CALCIUM 9.4 01/20/2022    AST 23 01/20/2022    ALT 18 01/20/2022    BUN 16 01/20/2022    CREATININE 0.60 01/20/2022    CREATININE 0.59 08/31/2021    CREATININE 0.67 02/04/2021    EGFRIFNONA 103 01/20/2022       Lab Results   Component Value Date    TSH 0.999 01/20/2022    FREET4 1.29 01/20/2022       Lab Results   Component Value Date     (H) 01/20/2022    HDL 63 (H) 01/20/2022    TRIG 86 01/20/2022    CHOLHDLRATIO 3.9 02/04/2021       Lab Results   Component Value Date    UMLW77ZQ 34.2 01/20/2022        Lab Results   Component Value Date    WBC 5.97 01/20/2022    HGB 13.9 01/20/2022    MCV 88.0 01/20/2022     01/20/2022       No results found for: PROTEIN, GLUCOSEU, BLOODU, NITRITEU, LEUKOCYTESUR     No results found for: HEPCVIRUSABY        ASSESSMENT & PLAN     Diagnoses and all orders for this visit:    1. Annual physical exam (Primary)    2. Lipid screening  -     CBC & Differential; Future  -     Comprehensive Metabolic Panel; Future  -     Lipid Panel; Future    3. Thyroid disorder screening  -     TSH; Future    4. Vitamin D deficiency  -     Vitamin D,25-Hydroxy; Future    5. Iron deficiency anemia, unspecified iron deficiency anemia type  -     Iron Profile; Future  -     Ferritin; Future    6. Encounter for screening mammogram for malignant neoplasm of breast  -     Mammo Screening Digital Tomosynthesis Bilateral With CAD; Future    7. Menopausal symptom  -     Estrogens, Total; Future  -     FSH & LH; Future  -     Prolactin; Future    Other orders  -     fluticasone (FLONASE) 50 MCG/ACT nasal spray; 2 sprays into the nostril(s) as directed by provider Daily As Needed for Rhinitis or Allergies.  Dispense: 15 mL; Refill: 4  -     ibuprofen (ADVIL,MOTRIN) 600 MG tablet; Take 1 tablet by mouth Every 6 (Six) Hours As Needed (every 6 to 8 hours as needed for pain.).  Dispense: 90 tablet; Refill: 2        HEALTHCARE MAINTENANCE ISSUES      Cancer Screening:  · Colon: Initial/Next screening at age: 50  · Repeat colon cancer screening: every 10 years  · Breast: Recommended monthly self exams; annual professional exam  · Mammogram: every 1 year  · Cervical: pelvic exam as recommended by gyne  · Skin: Monthly self skin examination, annual exam by health professional      Lifestyle Counseling:  · Lifestyle Modifications: Attempt to lose weight, Continue good lifestyle choices/modifications and Improve dietary compliance  · Safety Issues: Always wear seatbelt, Avoid texting while driving   · Use sunscreen, regular skin examination  · Recommended annual dental/vision examination.  · Emotional/Stress/Sleep: Reviewed and  given when appropriate    Part of this note may be electronic transcription/translation of spoken language to printed text using the Dragon dictation system

## 2023-02-14 ENCOUNTER — LAB (OUTPATIENT)
Dept: FAMILY MEDICINE CLINIC | Facility: CLINIC | Age: 58
End: 2023-02-14
Payer: OTHER GOVERNMENT

## 2023-02-14 DIAGNOSIS — Z13.220 LIPID SCREENING: ICD-10-CM

## 2023-02-14 DIAGNOSIS — D50.9 IRON DEFICIENCY ANEMIA, UNSPECIFIED IRON DEFICIENCY ANEMIA TYPE: ICD-10-CM

## 2023-02-14 DIAGNOSIS — E55.9 VITAMIN D DEFICIENCY: ICD-10-CM

## 2023-02-14 DIAGNOSIS — Z13.29 THYROID DISORDER SCREENING: ICD-10-CM

## 2023-02-14 DIAGNOSIS — N95.1 MENOPAUSAL SYMPTOM: ICD-10-CM

## 2023-02-14 PROCEDURE — 80053 COMPREHEN METABOLIC PANEL: CPT | Performed by: NURSE PRACTITIONER

## 2023-02-14 PROCEDURE — 82306 VITAMIN D 25 HYDROXY: CPT | Performed by: NURSE PRACTITIONER

## 2023-02-14 PROCEDURE — 83540 ASSAY OF IRON: CPT | Performed by: NURSE PRACTITIONER

## 2023-02-14 PROCEDURE — 84466 ASSAY OF TRANSFERRIN: CPT | Performed by: NURSE PRACTITIONER

## 2023-02-14 PROCEDURE — 82728 ASSAY OF FERRITIN: CPT | Performed by: NURSE PRACTITIONER

## 2023-02-14 PROCEDURE — 83001 ASSAY OF GONADOTROPIN (FSH): CPT | Performed by: NURSE PRACTITIONER

## 2023-02-14 PROCEDURE — 84443 ASSAY THYROID STIM HORMONE: CPT | Performed by: NURSE PRACTITIONER

## 2023-02-14 PROCEDURE — 83002 ASSAY OF GONADOTROPIN (LH): CPT | Performed by: NURSE PRACTITIONER

## 2023-02-14 PROCEDURE — 80061 LIPID PANEL: CPT | Performed by: NURSE PRACTITIONER

## 2023-02-14 PROCEDURE — 84146 ASSAY OF PROLACTIN: CPT | Performed by: NURSE PRACTITIONER

## 2023-02-14 PROCEDURE — 36415 COLL VENOUS BLD VENIPUNCTURE: CPT | Performed by: NURSE PRACTITIONER

## 2023-02-14 PROCEDURE — 85025 COMPLETE CBC W/AUTO DIFF WBC: CPT | Performed by: NURSE PRACTITIONER

## 2023-02-15 LAB
25(OH)D3 SERPL-MCNC: 41.4 NG/ML (ref 30–100)
ALBUMIN SERPL-MCNC: 4.8 G/DL (ref 3.5–5.2)
ALBUMIN/GLOB SERPL: 1.7 G/DL
ALP SERPL-CCNC: 76 U/L (ref 39–117)
ALT SERPL W P-5'-P-CCNC: 18 U/L (ref 1–33)
ANION GAP SERPL CALCULATED.3IONS-SCNC: 7.9 MMOL/L (ref 5–15)
AST SERPL-CCNC: 22 U/L (ref 1–32)
BASOPHILS # BLD AUTO: 0.02 10*3/MM3 (ref 0–0.2)
BASOPHILS NFR BLD AUTO: 0.4 % (ref 0–1.5)
BILIRUB SERPL-MCNC: 0.5 MG/DL (ref 0–1.2)
BUN SERPL-MCNC: 19 MG/DL (ref 6–20)
BUN/CREAT SERPL: 33.9 (ref 7–25)
CALCIUM SPEC-SCNC: 10 MG/DL (ref 8.6–10.5)
CHLORIDE SERPL-SCNC: 103 MMOL/L (ref 98–107)
CHOLEST SERPL-MCNC: 206 MG/DL (ref 0–200)
CO2 SERPL-SCNC: 28.1 MMOL/L (ref 22–29)
CREAT SERPL-MCNC: 0.56 MG/DL (ref 0.57–1)
DEPRECATED RDW RBC AUTO: 39.5 FL (ref 37–54)
EGFRCR SERPLBLD CKD-EPI 2021: 106.6 ML/MIN/1.73
EOSINOPHIL # BLD AUTO: 0.1 10*3/MM3 (ref 0–0.4)
EOSINOPHIL NFR BLD AUTO: 1.8 % (ref 0.3–6.2)
ERYTHROCYTE [DISTWIDTH] IN BLOOD BY AUTOMATED COUNT: 12.6 % (ref 12.3–15.4)
FERRITIN SERPL-MCNC: 104 NG/ML (ref 13–150)
FSH SERPL-ACNC: 51.4 MIU/ML
GLOBULIN UR ELPH-MCNC: 2.9 GM/DL
GLUCOSE SERPL-MCNC: 96 MG/DL (ref 65–99)
HCT VFR BLD AUTO: 41.5 % (ref 34–46.6)
HDLC SERPL-MCNC: 71 MG/DL (ref 40–60)
HGB BLD-MCNC: 14.2 G/DL (ref 12–15.9)
IMM GRANULOCYTES # BLD AUTO: 0.01 10*3/MM3 (ref 0–0.05)
IMM GRANULOCYTES NFR BLD AUTO: 0.2 % (ref 0–0.5)
IRON 24H UR-MRATE: 120 MCG/DL (ref 37–145)
IRON SATN MFR SERPL: 29 % (ref 20–50)
LDLC SERPL CALC-MCNC: 118 MG/DL (ref 0–100)
LDLC/HDLC SERPL: 1.64 {RATIO}
LH SERPL-ACNC: 29.6 MIU/ML
LYMPHOCYTES # BLD AUTO: 1.7 10*3/MM3 (ref 0.7–3.1)
LYMPHOCYTES NFR BLD AUTO: 30.9 % (ref 19.6–45.3)
MCH RBC QN AUTO: 29.3 PG (ref 26.6–33)
MCHC RBC AUTO-ENTMCNC: 34.2 G/DL (ref 31.5–35.7)
MCV RBC AUTO: 85.7 FL (ref 79–97)
MONOCYTES # BLD AUTO: 0.44 10*3/MM3 (ref 0.1–0.9)
MONOCYTES NFR BLD AUTO: 8 % (ref 5–12)
NEUTROPHILS NFR BLD AUTO: 3.23 10*3/MM3 (ref 1.7–7)
NEUTROPHILS NFR BLD AUTO: 58.7 % (ref 42.7–76)
NRBC BLD AUTO-RTO: 0 /100 WBC (ref 0–0.2)
PLATELET # BLD AUTO: 342 10*3/MM3 (ref 140–450)
PMV BLD AUTO: 9.8 FL (ref 6–12)
POTASSIUM SERPL-SCNC: 4.1 MMOL/L (ref 3.5–5.2)
PROLACTIN SERPL-MCNC: 11.5 NG/ML (ref 4.79–23.3)
PROT SERPL-MCNC: 7.7 G/DL (ref 6–8.5)
RBC # BLD AUTO: 4.84 10*6/MM3 (ref 3.77–5.28)
SODIUM SERPL-SCNC: 139 MMOL/L (ref 136–145)
TIBC SERPL-MCNC: 414 MCG/DL (ref 298–536)
TRANSFERRIN SERPL-MCNC: 278 MG/DL (ref 200–360)
TRIGL SERPL-MCNC: 94 MG/DL (ref 0–150)
TSH SERPL DL<=0.05 MIU/L-ACNC: 0.42 UIU/ML (ref 0.27–4.2)
VLDLC SERPL-MCNC: 17 MG/DL (ref 5–40)
WBC NRBC COR # BLD: 5.5 10*3/MM3 (ref 3.4–10.8)

## 2023-02-23 ENCOUNTER — PROCEDURE VISIT (OUTPATIENT)
Dept: FAMILY MEDICINE CLINIC | Facility: CLINIC | Age: 58
End: 2023-02-23
Payer: OTHER GOVERNMENT

## 2023-02-23 VITALS
BODY MASS INDEX: 24.62 KG/M2 | SYSTOLIC BLOOD PRESSURE: 116 MMHG | HEIGHT: 65 IN | WEIGHT: 147.8 LBS | TEMPERATURE: 97.8 F | OXYGEN SATURATION: 99 % | DIASTOLIC BLOOD PRESSURE: 68 MMHG | HEART RATE: 71 BPM

## 2023-02-23 DIAGNOSIS — L98.9 SKIN LESION: Primary | ICD-10-CM

## 2023-02-23 DIAGNOSIS — L60.9 NAIL ABNORMALITIES: ICD-10-CM

## 2023-02-23 PROCEDURE — 36415 COLL VENOUS BLD VENIPUNCTURE: CPT | Performed by: NURSE PRACTITIONER

## 2023-02-23 PROCEDURE — 82607 VITAMIN B-12: CPT | Performed by: NURSE PRACTITIONER

## 2023-02-23 PROCEDURE — 84590 ASSAY OF VITAMIN A: CPT | Performed by: NURSE PRACTITIONER

## 2023-02-23 PROCEDURE — 84446 ASSAY OF VITAMIN E: CPT | Performed by: NURSE PRACTITIONER

## 2023-02-23 PROCEDURE — 82746 ASSAY OF FOLIC ACID SERUM: CPT | Performed by: NURSE PRACTITIONER

## 2023-02-23 PROCEDURE — 99214 OFFICE O/P EST MOD 30 MIN: CPT | Performed by: NURSE PRACTITIONER

## 2023-02-23 NOTE — PROGRESS NOTES
"Chief Complaint  SKIN TAG REMOVAL  and Procedure    Subjective        Medical History: has a past medical history of Arthritis, Cervical pain (neck) (01/20/2020), Headache, Leiomyoma, Lipoma of right upper extremity (02/02/2021), Neural foraminal stenosis of cervical spine (01/20/2020), Right hip pain (02/02/2021), Right patella fracture (CURRENTLY), Thyroid nodule, and Varicose veins of lower extremity with pain, left (06/19/2020).     Surgical History: has a past surgical history that includes Gallbladder surgery (2018); Lipoma Excision; Patella Open Reduction Internal Fixation (Right, 08/20/2021); Sinus surgery; and Knee surgery.     Family History: family history includes Alzheimer's disease in her mother; Breast cancer in an other family member; Lung cancer in her father; Prostate cancer in her father.     Social History: reports that she quit smoking about 24 years ago. Her smoking use included cigarettes. She has a 1.00 pack-year smoking history. She has never used smokeless tobacco. She reports that she does not currently use alcohol. She reports that she does not use drugs.    Leola Montana presents to [facility]  History of Present Illness  Patient is complaining of divots in her fingernails.  States its been ongoing for the past 6 months.  Denies any new medications, no vitamin deficiencies that she is aware of.  She has not tried anything help with the nail issues.  The divots continue, more prominent in bilateral thumb nails    Patient has a papular skin growth secondary to sun damage on right upper medial arm.  Patient comes in today for cryotherapy to remove the growth.  States that she does love sun, has been on the sun very frequently throughout her life.  Denies any history of skin cancers, no family history of skin cancers.  Objective   Vital Signs:   /68   Pulse 71   Temp 97.8 °F (36.6 °C)   Ht 165.1 cm (65\")   Wt 67 kg (147 lb 12.8 oz)   SpO2 99%   BMI " 24.60 kg/m²       Wt Readings from Last 3 Encounters:   02/23/23 67 kg (147 lb 12.8 oz)   02/13/23 67.6 kg (149 lb)   11/15/22 67 kg (147 lb 9.6 oz)        BP Readings from Last 3 Encounters:   02/23/23 116/68   02/13/23 112/72   01/18/22 120/70        BMI is within normal parameters. No other follow-up for BMI required.       Physical Exam  Vitals reviewed.   Constitutional:       General: She is not in acute distress.     Appearance: She is not ill-appearing.   HENT:      Head: Normocephalic and atraumatic.   Pulmonary:      Effort: Pulmonary effort is normal.   Skin:     General: Skin is warm and dry.               Result Review :  {The following data was reviewed by CHEYANNE West on 02/23/2023.  Common labs    Common Labs 2/14/23 2/14/23 2/14/23    0819 0819 0819   Glucose  96    BUN  19    Creatinine  0.56 (A)    Sodium  139    Potassium  4.1    Chloride  103    Calcium  10.0    Albumin  4.8    Total Bilirubin  0.5    Alkaline Phosphatase  76    AST (SGOT)  22    ALT (SGPT)  18    WBC 5.50     Hemoglobin 14.2     Hematocrit 41.5     Platelets 342     Total Cholesterol   206 (A)   Triglycerides   94   HDL Cholesterol   71 (A)   LDL Cholesterol    118 (A)   (A) Abnormal value               Cryotherapy, Skin Lesion    Date/Time: 2/23/2023 12:26 PM  Performed by: Elissa Miller APRN  Authorized by: Elissa Miller APRN   Preparation: Patient was prepped and draped in the usual sterile fashion.  Local anesthesia used: no    Anesthesia:  Local anesthesia used: no    Sedation:  Patient sedated: no    Patient tolerance: patient tolerated the procedure well with no immediate complications           Risk and benefits of cryotherapy for (single skin lesion) were discussed with the patient prior to initiating the procedure.  Patient was given opportunity to ask questions.  Patient provided verbal consent to treatment.  Each area of concern was treated with 3-4 rounds of cryotherapy.  Patient  overall tolerated the procedure well.  There were no immediate complications.    Current Outpatient Medications on File Prior to Visit   Medication Sig Dispense Refill   • Cholecalciferol 50 MCG (2000 UT) capsule Take 2,000 Units by mouth Daily.     • Ferrous Sulfate (Iron) 28 MG tablet Take 1 tablet by mouth Daily.     • fluticasone (FLONASE) 50 MCG/ACT nasal spray 2 sprays into the nostril(s) as directed by provider Daily As Needed for Rhinitis or Allergies. 15 mL 4   • ibuprofen (ADVIL,MOTRIN) 600 MG tablet Take 1 tablet by mouth Every 6 (Six) Hours As Needed (every 6 to 8 hours as needed for pain.). 90 tablet 2   • multivitamin with minerals tablet tablet Take 1 tablet by mouth Every Other Day.       No current facility-administered medications on file prior to visit.        Assessment and Plan  Diagnoses and all orders for this visit:    1. Skin lesion (Primary)  Comments:  See cryotherapy note for procedure    2. Nail abnormalities  Comments:  We will check vitamin A, E, B12 and folate, vitamin D was was within normal limits recently checked.  Orders:  -     Vitamin B12  -     Folate  -     Vitamin A & E    Other orders  -     Cryotherapy, Skin Lesion        Follow Up   Return for If symptoms do not improve new concerning symptoms.  Patient was given instructions and counseling regarding her condition or for health maintenance advice. Please see specific information pulled into the AVS if appropriate.       Part of this note may be electronic transcription/translation of spoken language to printed text using the Dragon dictation system

## 2023-02-24 LAB
FOLATE SERPL-MCNC: >20 NG/ML (ref 4.78–24.2)
VIT B12 BLD-MCNC: 404 PG/ML (ref 211–946)

## 2023-03-05 LAB
A-TOCOPHEROL VIT E SERPL-MCNC: 13 MG/L (ref 7–25.1)
GAMMA TOCOPHEROL SERPL-MCNC: 0.5 MG/L (ref 0.5–5.5)
VIT A SERPL-MCNC: 36.6 UG/DL (ref 20.1–62)

## 2023-05-02 ENCOUNTER — HOSPITAL ENCOUNTER (OUTPATIENT)
Dept: MAMMOGRAPHY | Facility: HOSPITAL | Age: 58
Discharge: HOME | End: 2023-05-02
Admitting: NURSE PRACTITIONER
Payer: OTHER GOVERNMENT

## 2023-05-02 DIAGNOSIS — Z12.31 ENCOUNTER FOR SCREENING MAMMOGRAM FOR MALIGNANT NEOPLASM OF BREAST: ICD-10-CM

## 2023-05-02 PROCEDURE — 77063 BREAST TOMOSYNTHESIS BI: CPT

## 2023-05-02 PROCEDURE — 77067 SCR MAMMO BI INCL CAD: CPT

## 2023-09-11 ENCOUNTER — OFFICE VISIT (OUTPATIENT)
Dept: FAMILY MEDICINE CLINIC | Facility: CLINIC | Age: 58
End: 2023-09-11
Payer: OTHER GOVERNMENT

## 2023-09-11 VITALS
WEIGHT: 146.6 LBS | OXYGEN SATURATION: 100 % | BODY MASS INDEX: 24.43 KG/M2 | HEIGHT: 65 IN | HEART RATE: 80 BPM | TEMPERATURE: 97.8 F | DIASTOLIC BLOOD PRESSURE: 68 MMHG | SYSTOLIC BLOOD PRESSURE: 120 MMHG

## 2023-09-11 DIAGNOSIS — Z23 NEED FOR INFLUENZA VACCINATION: ICD-10-CM

## 2023-09-11 DIAGNOSIS — Z12.4 CERVICAL CANCER SCREENING: ICD-10-CM

## 2023-09-11 DIAGNOSIS — N95.1 HOT FLASHES DUE TO MENOPAUSE: ICD-10-CM

## 2023-09-11 DIAGNOSIS — Z09 FOLLOW-UP EXAM: Primary | ICD-10-CM

## 2023-09-11 DIAGNOSIS — L98.9 SKIN LESION OF CHEST WALL: ICD-10-CM

## 2023-09-11 NOTE — PROGRESS NOTES
Chief Complaint  MOLE ON CHEST     Subjective        Medical History: has a past medical history of Arthritis, Cervical pain (neck) (01/20/2020), Headache, Leiomyoma, Lipoma of right upper extremity (02/02/2021), Neural foraminal stenosis of cervical spine (01/20/2020), Right hip pain (02/02/2021), Right patella fracture (CURRENTLY), Thyroid nodule, and Varicose veins of lower extremity with pain, left (06/19/2020).     Surgical History: has a past surgical history that includes Gallbladder surgery (2018); Lipoma Excision; Patella Open Reduction Internal Fixation (Right, 08/20/2021); Sinus surgery; and Knee surgery.     Family History: family history includes Alzheimer's disease in her mother; Breast cancer in an other family member; Lung cancer in her father; Prostate cancer in her father.     Social History: reports that she quit smoking about 24 years ago. Her smoking use included cigarettes. She has a 1.00 pack-year smoking history. She has never used smokeless tobacco. She reports that she does not currently use alcohol. She reports that she does not use drugs.    Leola Montana presents to [facility]  History of Present Illness  Patient is a 57-year-old female who comes in today wanting to discuss a skin lesion on left outer breast been there for about 2 years, is flesh-colored, no increase in size or skin color changes, nonpainful, no previous history of skin cancer, no family history of skin cancer.  Patient wanted it checked out because the skin lesion was on the left breast.  She has had normal mammograms recently last mammogram was 5/2/2023.    Patient also has a slight discolored skin lesion on the right outer breast at 3:00.  It appears to be a blackhead, has been there for about 8 months, no increase in pain, does have a small black appearance but no lesion noted or well-rounded borders noted.  It is nonpainful.  Again patient's had a normal mammogram on  "5/2/2023.    Patient wanted to discuss bioidentical hormones.  She states her sister lives in [place] and has been out of and doing well on the bioidentical hormones.  She states she has done research here but has difficulty finding treatment options.  She does see Dr. Abreu GYN for cervical screening.  She states its been close to 3 years since her last Pap smear, never had an abnormal Pap in the past.  Discussed with patient will refer back over to Dr. bAreu today discussed hormonal placement therapy and update Pap smear.  Patient verbalized understanding.  Objective   Vital Signs:   /68   Pulse 80   Temp 97.8 °F (36.6 °C)   Ht 165.1 cm (65\")   Wt 66.5 kg (146 lb 9.6 oz)   SpO2 100%   BMI 24.40 kg/m²       Wt Readings from Last 3 Encounters:   09/11/23 66.5 kg (146 lb 9.6 oz)   07/10/23 66.2 kg (146 lb)   02/23/23 67 kg (147 lb 12.8 oz)        BP Readings from Last 3 Encounters:   09/11/23 120/68   07/10/23 106/66   02/23/23 116/68        BMI is within normal parameters. No other follow-up for BMI required.       Physical Exam  Vitals reviewed.   Constitutional:       Appearance: Normal appearance. She is well-developed.   HENT:      Head: Normocephalic and atraumatic.   Eyes:      Conjunctiva/sclera: Conjunctivae normal.      Pupils: Pupils are equal, round, and reactive to light.   Cardiovascular:      Rate and Rhythm: Normal rate and regular rhythm.      Heart sounds: No murmur heard.  Pulmonary:      Effort: Pulmonary effort is normal.      Breath sounds: Normal breath sounds. No wheezing or rhonchi.   Skin:     General: Skin is warm and dry.      Comments: Flesh tone skin lesion consistent with noncancerous mole 9:00 outer breast, no inflammatory changes of the breast,   Neurological:      Mental Status: She is alert and oriented to person, place, and time.   Psychiatric:         Mood and Affect: Mood and affect normal.         Behavior: Behavior normal.         Thought Content: Thought content " normal.         Judgment: Judgment normal.      Result Review :  {The following data was reviewed by CHEYANNE West on 09/11/2023.  Common labs          2/14/2023    08:19 7/12/2023    08:28   Common Labs   Glucose 96     BUN 19     Creatinine 0.56     Sodium 139     Potassium 4.1     Chloride 103     Calcium 10.0     Albumin 4.8     Total Bilirubin 0.5     Alkaline Phosphatase 76     AST (SGOT) 22     ALT (SGPT) 18     WBC 5.50     Hemoglobin 14.2     Hematocrit 41.5     Platelets 342     Total Cholesterol 206  204    Triglycerides 94  112    HDL Cholesterol 71  51    LDL Cholesterol  118  133                  Current Outpatient Medications on File Prior to Visit   Medication Sig Dispense Refill    fluticasone (FLONASE) 50 MCG/ACT nasal spray 2 sprays into the nostril(s) as directed by provider Daily As Needed for Rhinitis or Allergies. 15 mL 4    ibuprofen (ADVIL,MOTRIN) 600 MG tablet Take 1 tablet by mouth Every 6 (Six) Hours As Needed (every 6 to 8 hours as needed for pain.). 90 tablet 2     No current facility-administered medications on file prior to visit.        Assessment and Plan  Diagnoses and all orders for this visit:    1. Follow-up exam (Primary)    2. Skin lesion of chest wall  Comments:  Discussed no findings consistent with cancer but to continue to monitor and what to monitor for patient verbalized understanding    3. Hot flashes due to menopause  Comments:  Will refer over to GYN for further work-up and management  Orders:  -     Ambulatory Referral to Obstetrics / Gynecology    4. Cervical cancer screening  Comments:  Patient states she is due for Pap will refer over to GYN Dr. Abreu  Orders:  -     Ambulatory Referral to Obstetrics / Gynecology    5. Need for influenza vaccination  Comments:  Patient is wanting flu vaccine today  Orders:  -     Fluzone (or Fluarix & Flulaval for VFC) >6 Mos ([id number])        Follow Up   Return for If symptoms do not improve new concerning  symptoms.  Patient was given instructions and counseling regarding her condition or for health maintenance advice. Please see specific information pulled into the AVS if appropriate.       Part of this note may be electronic transcription/translation of spoken language to printed text using the Dragon dictation system

## 2023-10-26 ENCOUNTER — APPOINTMENT (OUTPATIENT)
Facility: HOSPITAL | Age: 58
End: 2023-10-26
Payer: OTHER GOVERNMENT

## 2023-10-26 ENCOUNTER — HOSPITAL ENCOUNTER (EMERGENCY)
Facility: HOSPITAL | Age: 58
Discharge: HOME | End: 2023-10-26
Attending: EMERGENCY MEDICINE
Payer: OTHER GOVERNMENT

## 2023-10-26 VITALS
WEIGHT: 146.61 LBS | BODY MASS INDEX: 24.43 KG/M2 | HEART RATE: 71 BPM | HEIGHT: 65 IN | RESPIRATION RATE: 18 BRPM | SYSTOLIC BLOOD PRESSURE: 121 MMHG | OXYGEN SATURATION: 99 % | TEMPERATURE: 98.1 F | DIASTOLIC BLOOD PRESSURE: 74 MMHG

## 2023-10-26 DIAGNOSIS — I82.890 SUPERFICIAL VEIN THROMBOSIS: Primary | ICD-10-CM

## 2023-10-26 LAB
ALBUMIN SERPL-MCNC: 4.4 G/DL (ref 3.5–5.2)
ALBUMIN/GLOB SERPL: 1.7 G/DL
ALP SERPL-CCNC: 67 U/L (ref 39–117)
ALT SERPL W P-5'-P-CCNC: 26 U/L (ref 1–33)
ANION GAP SERPL CALCULATED.3IONS-SCNC: 10 MMOL/L (ref 5–15)
AST SERPL-CCNC: 27 U/L (ref 1–32)
BASOPHILS # BLD AUTO: 0.02 10*3/MM3 (ref 0–0.2)
BASOPHILS NFR BLD AUTO: 0.2 % (ref 0–1.5)
BH CV LOW VAS LEFT GREATER SAPH BK VESSEL: 1
BH CV LOWER VASCULAR LEFT COMMON FEMORAL AUGMENT: NORMAL
BH CV LOWER VASCULAR LEFT COMMON FEMORAL COMPETENT: NORMAL
BH CV LOWER VASCULAR LEFT COMMON FEMORAL COMPRESS: NORMAL
BH CV LOWER VASCULAR LEFT COMMON FEMORAL PHASIC: NORMAL
BH CV LOWER VASCULAR LEFT COMMON FEMORAL SPONT: NORMAL
BH CV LOWER VASCULAR LEFT DISTAL FEMORAL AUGMENT: NORMAL
BH CV LOWER VASCULAR LEFT DISTAL FEMORAL COMPETENT: NORMAL
BH CV LOWER VASCULAR LEFT DISTAL FEMORAL COMPRESS: NORMAL
BH CV LOWER VASCULAR LEFT DISTAL FEMORAL PHASIC: NORMAL
BH CV LOWER VASCULAR LEFT DISTAL FEMORAL SPONT: NORMAL
BH CV LOWER VASCULAR LEFT GASTRONEMIUS COMPRESS: NORMAL
BH CV LOWER VASCULAR LEFT GREATER SAPH AK COMPRESS: NORMAL
BH CV LOWER VASCULAR LEFT GREATER SAPH BK COMPRESS: NORMAL
BH CV LOWER VASCULAR LEFT GREATER SAPH BK THROMBUS: NORMAL
BH CV LOWER VASCULAR LEFT LESSER SAPH COMPRESS: NORMAL
BH CV LOWER VASCULAR LEFT MID FEMORAL COMPRESS: NORMAL
BH CV LOWER VASCULAR LEFT PERONEAL COMPRESS: NORMAL
BH CV LOWER VASCULAR LEFT POPLITEAL AUGMENT: NORMAL
BH CV LOWER VASCULAR LEFT POPLITEAL COMPETENT: NORMAL
BH CV LOWER VASCULAR LEFT POPLITEAL COMPRESS: NORMAL
BH CV LOWER VASCULAR LEFT POPLITEAL PHASIC: NORMAL
BH CV LOWER VASCULAR LEFT POPLITEAL SPONT: NORMAL
BH CV LOWER VASCULAR LEFT POSTERIOR TIBIAL AUGMENT: NORMAL
BH CV LOWER VASCULAR LEFT POSTERIOR TIBIAL COMPRESS: NORMAL
BH CV LOWER VASCULAR LEFT PROXIMAL FEMORAL COMPRESS: NORMAL
BH CV LOWER VASCULAR RIGHT COMMON FEMORAL AUGMENT: NORMAL
BH CV LOWER VASCULAR RIGHT COMMON FEMORAL COMPETENT: NORMAL
BH CV LOWER VASCULAR RIGHT COMMON FEMORAL COMPRESS: NORMAL
BH CV LOWER VASCULAR RIGHT COMMON FEMORAL PHASIC: NORMAL
BH CV LOWER VASCULAR RIGHT COMMON FEMORAL SPONT: NORMAL
BH CV VAS PRELIMINARY FINDINGS SCRIPTING: 1
BILIRUB SERPL-MCNC: 0.5 MG/DL (ref 0–1.2)
BUN SERPL-MCNC: 14 MG/DL (ref 6–20)
BUN/CREAT SERPL: 20.9 (ref 7–25)
CALCIUM SPEC-SCNC: 9.8 MG/DL (ref 8.6–10.5)
CHLORIDE SERPL-SCNC: 102 MMOL/L (ref 98–107)
CO2 SERPL-SCNC: 27 MMOL/L (ref 22–29)
CREAT SERPL-MCNC: 0.67 MG/DL (ref 0.57–1)
DEPRECATED RDW RBC AUTO: 41.9 FL (ref 37–54)
EGFRCR SERPLBLD CKD-EPI 2021: 102.1 ML/MIN/1.73
EOSINOPHIL # BLD AUTO: 0.12 10*3/MM3 (ref 0–0.4)
EOSINOPHIL NFR BLD AUTO: 1.3 % (ref 0.3–6.2)
ERYTHROCYTE [DISTWIDTH] IN BLOOD BY AUTOMATED COUNT: 13 % (ref 12.3–15.4)
GLOBULIN UR ELPH-MCNC: 2.6 GM/DL
GLUCOSE SERPL-MCNC: 97 MG/DL (ref 65–99)
HCT VFR BLD AUTO: 36.5 % (ref 34–46.6)
HGB BLD-MCNC: 12.1 G/DL (ref 12–15.9)
HOLD SPECIMEN: NORMAL
HOLD SPECIMEN: NORMAL
IMM GRANULOCYTES # BLD AUTO: 0.03 10*3/MM3 (ref 0–0.05)
IMM GRANULOCYTES NFR BLD AUTO: 0.3 % (ref 0–0.5)
LYMPHOCYTES # BLD AUTO: 2.6 10*3/MM3 (ref 0.7–3.1)
LYMPHOCYTES NFR BLD AUTO: 27.3 % (ref 19.6–45.3)
MCH RBC QN AUTO: 29.7 PG (ref 26.6–33)
MCHC RBC AUTO-ENTMCNC: 33.2 G/DL (ref 31.5–35.7)
MCV RBC AUTO: 89.5 FL (ref 79–97)
MONOCYTES # BLD AUTO: 0.81 10*3/MM3 (ref 0.1–0.9)
MONOCYTES NFR BLD AUTO: 8.5 % (ref 5–12)
NEUTROPHILS NFR BLD AUTO: 5.96 10*3/MM3 (ref 1.7–7)
NEUTROPHILS NFR BLD AUTO: 62.4 % (ref 42.7–76)
NRBC BLD AUTO-RTO: 0 /100 WBC (ref 0–0.2)
PLATELET # BLD AUTO: 276 10*3/MM3 (ref 140–450)
PMV BLD AUTO: 8.9 FL (ref 6–12)
POTASSIUM SERPL-SCNC: 4.2 MMOL/L (ref 3.5–5.2)
PROT SERPL-MCNC: 7 G/DL (ref 6–8.5)
RBC # BLD AUTO: 4.08 10*6/MM3 (ref 3.77–5.28)
SODIUM SERPL-SCNC: 139 MMOL/L (ref 136–145)
WBC NRBC COR # BLD: 9.54 10*3/MM3 (ref 3.4–10.8)
WHOLE BLOOD HOLD COAG: NORMAL
WHOLE BLOOD HOLD SPECIMEN: NORMAL

## 2023-10-26 PROCEDURE — 93971 EXTREMITY STUDY: CPT | Performed by: SURGERY

## 2023-10-26 PROCEDURE — 99284 EMERGENCY DEPT VISIT MOD MDM: CPT

## 2023-10-26 PROCEDURE — 80053 COMPREHEN METABOLIC PANEL: CPT | Performed by: EMERGENCY MEDICINE

## 2023-10-26 PROCEDURE — 85025 COMPLETE CBC W/AUTO DIFF WBC: CPT

## 2023-10-26 PROCEDURE — 93971 EXTREMITY STUDY: CPT

## 2023-10-26 PROCEDURE — 36415 COLL VENOUS BLD VENIPUNCTURE: CPT

## 2023-10-26 RX ORDER — IBUPROFEN 600 MG/1
600 TABLET ORAL EVERY 8 HOURS PRN
Qty: 30 TABLET | Refills: 0 | Status: SHIPPED | OUTPATIENT
Start: 2023-10-26

## 2023-10-26 NOTE — ED PROVIDER NOTES
Time: 5:13 PM EDT  Date of encounter:  10/26/2023  Independent Historian/Clinical History and Information was obtained by:   Patient    History is limited by: N/A    Chief Complaint: Leg swelling      History of Present Illness:  Patient is a 57 y.o. year old female who presents to the emergency department for evaluation of swelling and pain over the left leg.  Patient was sent from urgent care with concern for possible DVT.  Patient states the symptoms started this morning when she woke up, denies fevers, denies trauma, denies history of DVT.    HPI    Patient Care Team  Primary Care Provider: Elissa Miller APRN    Past Medical History:     No Known Allergies  Past Medical History:   Diagnosis Date    Arthritis     Cervical pain (neck) 01/20/2020    Headache     Leiomyoma     11/5/21 fibroid x2, 4.3cm subserosal and 3.8cm lat/post    Lipoma of right upper extremity 02/02/2021    hip/side    Neural foraminal stenosis of cervical spine 01/20/2020    Right hip pain 02/02/2021    Right patella fracture CURRENTLY    Thyroid nodule     Varicose veins of lower extremity with pain, left 06/19/2020     Past Surgical History:   Procedure Laterality Date    GALLBLADDER SURGERY  2018    KNEE SURGERY      LIPOMA EXCISION      neck 10 yrs ago    PATELLA OPEN REDUCTION INTERNAL FIXATION Right 08/20/2021    Procedure: PATELLA OPEN REDUCTION INTERNAL FIXATION;  Surgeon: Scot Cuevas MD;  Location: [facility] MAIN OR;  Service: Orthopedics;  Laterality: Right;    SINUS SURGERY       Family History   Problem Relation Age of Onset    Alzheimer's disease Mother     Prostate cancer Father     Lung cancer Father     Breast cancer Other        Home Medications:  Prior to Admission medications    Medication Sig Start Date End Date Taking? Authorizing Provider   fluticasone (FLONASE) 50 MCG/ACT nasal spray 2 sprays into the nostril(s) as directed by provider Daily As Needed for Rhinitis or Allergies. 2/13/23   Elissa Miller  "CHEYANNE Segura   ibuprofen (ADVIL,MOTRIN) 600 MG tablet Take 1 tablet by mouth Every 6 (Six) Hours As Needed (every 6 to 8 hours as needed for pain.). 23   Elissa Miller CHEYANNE Segura        Social History:   Social History     Tobacco Use    Smoking status: Former     Packs/day: 0.20     Years: 5.00     Additional pack years: 0.00     Total pack years: 1.00     Types: Cigarettes     Quit date:      Years since quittin.8    Smokeless tobacco: Never    Tobacco comments:     smoked 5 years, quite 30 years ago   Vaping Use    Vaping Use: Never used   Substance Use Topics    Alcohol use: Not Currently     Comment: occasionally drinks     Drug use: Never         Review of Systems:  Review of Systems   Constitutional:  Negative for fever.   HENT:  Negative for sore throat.    Eyes: Negative.    Respiratory:  Negative for cough and shortness of breath.    Cardiovascular:  Positive for leg swelling. Negative for chest pain.   Gastrointestinal:  Negative for abdominal pain, diarrhea and vomiting.   Genitourinary:  Negative for dysuria.   Musculoskeletal:  Positive for myalgias. Negative for neck pain.   Skin:  Negative for rash.   Allergic/Immunologic: Negative.    Neurological:  Negative for weakness, numbness and headaches.   Hematological: Negative.    Psychiatric/Behavioral: Negative.     All other systems reviewed and are negative.       Physical Exam:  /74   Pulse 71   Temp 98.1 °F (36.7 °C)   Resp 18   Ht 165.1 cm (65\")   Wt 66.5 kg (146 lb 9.7 oz)   SpO2 99%   BMI 24.40 kg/m²     Physical Exam  Vitals and nursing note reviewed.   Constitutional:       General: She is not in acute distress.     Appearance: Normal appearance. She is not toxic-appearing.   HENT:      Head: Normocephalic and atraumatic.      Jaw: There is normal jaw occlusion.   Eyes:      General: Lids are normal.      Extraocular Movements: Extraocular movements intact.      Conjunctiva/sclera: Conjunctivae normal.      Pupils: " Pupils are equal, round, and reactive to light.   Cardiovascular:      Rate and Rhythm: Normal rate and regular rhythm.      Pulses: Normal pulses. No decreased pulses.           Dorsalis pedis pulses are 2+ on the left side.        Posterior tibial pulses are 2+ on the left side.      Heart sounds: Normal heart sounds.      Comments: Localized swelling and tenderness to proximal and medial left lower leg extending up the inner thigh.  Pulmonary:      Effort: Pulmonary effort is normal. No respiratory distress.      Breath sounds: Normal breath sounds. No wheezing or rhonchi.   Abdominal:      General: Abdomen is flat.      Palpations: Abdomen is soft.      Tenderness: There is no abdominal tenderness. There is no guarding or rebound.   Musculoskeletal:         General: Normal range of motion.      Cervical back: Normal range of motion and neck supple.      Right lower leg: No edema.      Left lower leg: No edema.   Skin:     General: Skin is warm and dry.   Neurological:      Mental Status: She is alert and oriented to person, place, and time. Mental status is at baseline.   Psychiatric:         Mood and Affect: Mood normal.                Procedures:  Procedures      Medical Decision Making:      Comorbidities that affect care:    History of varicose veins of the lower extremity    External Notes reviewed:    Previous Clinic Note: Urgent care visit from earlier today , family medicine office visit from 9/11/2023 for a mole on her chest      The following orders were placed and all results were independently analyzed by me:  Orders Placed This Encounter   Procedures    [place] Draw    Comprehensive Metabolic Panel    CBC Auto Differential    NPO Diet NPO Type: Strict NPO    Undress & Gown    CBC & Differential    Green Top (Gel)    Lavender Top    Gold Top - SST    Light Blue Top       Medications Given in the Emergency Department:  Medications - No data to display     ED Course:    ED Course as of 10/26/23 2001    Thu Oct 26, 2023   1838 Preliminary results of ultrasound: Negative for DVT. Positive for SVT left GSV from the knee to the proximal lower leg. SVT is not close to the deep system. [RM]      ED Course User Index  [RM] Maria C Ortez APRN       Labs:    Lab Results (last 24 hours)       Procedure Component Value Units Date/Time    CBC & Differential [[id number]]  (Normal) Collected: 10/26/23 1641    Specimen: Blood from Arm, Right Updated: 10/26/23 1652    Narrative:      The following orders were created for panel order CBC & Differential.  Procedure                               Abnormality         Status                     ---------                               -----------         ------                     CBC Auto Differential[[id number]]        Normal              Final result                 Please view results for these tests on the individual orders.    Comprehensive Metabolic Panel [[id number]] Collected: 10/26/23 1641    Specimen: Blood from Arm, Right Updated: 10/26/23 1740     Glucose 97 mg/dL      BUN 14 mg/dL      Creatinine 0.67 mg/dL      Sodium 139 mmol/L      Potassium 4.2 mmol/L      Chloride 102 mmol/L      CO2 27.0 mmol/L      Calcium 9.8 mg/dL      Total Protein 7.0 g/dL      Albumin 4.4 g/dL      ALT (SGPT) 26 U/L      AST (SGOT) 27 U/L      Alkaline Phosphatase 67 U/L      Total Bilirubin 0.5 mg/dL      Globulin 2.6 gm/dL      A/G Ratio 1.7 g/dL      BUN/Creatinine Ratio 20.9     Anion Gap 10.0 mmol/L      eGFR 102.1 mL/min/1.73     Narrative:      GFR Normal >60  Chronic Kidney Disease <60  Kidney Failure <15      CBC Auto Differential [[id number]]  (Normal) Collected: 10/26/23 1641    Specimen: Blood from Arm, Right Updated: 10/26/23 1652     WBC 9.54 10*3/mm3      RBC 4.08 10*6/mm3      Hemoglobin 12.1 g/dL      Hematocrit 36.5 %      MCV 89.5 fL      MCH 29.7 pg      MCHC 33.2 g/dL      RDW 13.0 %      RDW-SD 41.9 fl      MPV 8.9 fL      Platelets 276 10*3/mm3      Neutrophil % 62.4 %       Lymphocyte % 27.3 %      Monocyte % 8.5 %      Eosinophil % 1.3 %      Basophil % 0.2 %      Immature Grans % 0.3 %      Neutrophils, Absolute 5.96 10*3/mm3      Lymphocytes, Absolute 2.60 10*3/mm3      Monocytes, Absolute 0.81 10*3/mm3      Eosinophils, Absolute 0.12 10*3/mm3      Basophils, Absolute 0.02 10*3/mm3      Immature Grans, Absolute 0.03 10*3/mm3      nRBC 0.0 /100 WBC              Imaging:    No Radiology Exams Resulted Within Past 24 Hours      Differential Diagnosis and Discussion:    Edema: Based on the patient's history, signs, and symptoms, the differential diagnosis includes but is not limited to heart failure, kidney disease, liver disease, venous insufficiency, lymphedema, pregnancy, medications, allergic reactions.  Extremity Pain: Differential diagnosis includes but is not limited to soft tissue sprain, tendonitis, tendon injury, dislocation, fracture, deep vein thrombosis, arterial insufficiency, osteoarthritis, bursitis, and ligamentous damage.    All labs were reviewed and interpreted by me.  Ultrasound impression was interpreted by me.     MDM     Preliminary results of the venous duplex ultrasound of the left lower extremity is negative for DVT, positive for SVT of the left GSV from the knee to the proximal lower leg.  The SVT is not close to the deep system.  The patient´s CBC that was reviewed and interpreted by me shows no abnormalities of critical concern. Of note, there is no anemia requiring a blood transfusion and the platelet count is acceptable.  The patient´s CMP that was reviewed and interpretted by me shows no abnormalities of critical concern. Of note, the patient´s sodium and potassium are acceptable. The patient´s liver enzymes are unremarkable. The patient´s renal function (creatinine) is preserved. The patient has a normal anion gap.  Patient will be advised to apply warm compresses, and treat pain and inflammation with NSAIDs.      Patient Care Considerations:    I  considered ordering anticoagulant, however the SVT is not close in proximity to the deep venous system.      Consultants/Shared Management Plan:    None    Social Determinants of Health:    Patient is independent, reliable, and has access to care.       Disposition and Care Coordination:    Discharged: The patient is suitable and stable for discharge with no need for consideration of observation or admission.    I have explained the patient´s condition, diagnoses and treatment plan based on the information available to me at this time. I have answered questions and addressed any concerns. The patient has a good  understanding of the patient´s diagnosis, condition, and treatment plan as can be expected at this point. The vital signs have been stable. The patient´s condition is stable and appropriate for discharge from the emergency department.      The patient will pursue further outpatient evaluation with the primary care physician or other designated or consulting physician as outlined in the discharge instructions. They are agreeable to this plan of care and follow-up instructions have been explained in detail. The patient has received these instructions in written format and have expressed an understanding of the discharge instructions. The patient is aware that any significant change in condition or worsening of symptoms should prompt an immediate return to this or the closest emergency department or call to 911.  I have explained discharge medications and the need for follow up with the patient/caretakers. This was also printed in the discharge instructions. Patient was discharged with the following medications and follow up:      Medication List        Changed      ibuprofen 600 MG tablet  Commonly known as: ADVIL,MOTRIN  Take 1 tablet by mouth Every 8 (Eight) Hours As Needed for Mild Pain.  What changed:   when to take this  reasons to take this               Where to Get Your Medications        These  medications were sent to [id number] - [place], [place] - [address] AT [facility] - [phone]  - [phone] FX  [address] [place] [address]      Phone: [phone]   ibuprofen 600 MG tablet      Elissa Miller, APRN  [address]  [address]  [place] [address]  [phone]    Call in 2 days         Final diagnoses:   Superficial vein thrombosis        ED Disposition       ED Disposition   Discharge    Condition   Stable    Comment   --               This medical record created using voice recognition software.             Maria C Ortez, CHEYANNE  10/26/23 2001

## 2023-10-31 NOTE — PROGRESS NOTES
"Well Woman Visit    CC: Scheduled annual well gyn visit  Chief Complaint   Patient presents with    Gynecologic Exam       HPI:   57 y.o.   Social History     Substance and Sexual Activity   Sexual Activity Yes    Partners: Male    Birth control/protection: None     Mammo Screening Digital Tomosynthesis Bilateral With CAD (2023 15:01)    Perimenopause, LMP 2023  Hot flashes and night sweats, waking in middle of the night.  Her sister in [place] had the same problem and is using an estrogen transdermal and Prometrium.  She would like to try the same.    Remote history of fibroids     Sister w osteoporosis, dx'd at 60, patient has never had a DEXA    PCP: does manage PMHx and preventative labs  History: PMHx, Meds, Allergies, PSHx, Social Hx, and POBHx all reviewed and updated.    PHYSICAL EXAM:  /74   Pulse 84   Ht 165.1 cm (65\")   Wt 68 kg (150 lb)   BMI 24.96 kg/m²  Not found.  General- NAD, alert and oriented, appropriate  Psych- Normal mood, good memory  Neck- No masses, no thyroid enlargement  CV- Regular rhythm, no murmurs  Resp- CTA to bases, no wheezes  Abdomen- Soft, non distended, non tender, no masses    Breast left-  Bilaterally symmetrical, no masses, non tender, no nipple discharge, no axillary or supraclavicular nodes palpable.    Breast right- Bilaterally symmetrical, no masses, non tender, no nipple discharge, no axillary or supraclavicular nodes palpable.      External genitalia- Normal female, no lesions  Urethra/meatus- Normal, no masses, non tender  Bladder- Normal, no masses, non tender  Vagina- Normal, no atrophy, no lesions, no discharge.  Prolapse : none noted, not examined with split speculum to delineate  Cvx- Normal, no lesions, no discharge, No cervical motion tenderness, cervical polyp, betadine x3 after consent, eloy removed and sent to path, pt jessica well  Uterus- Normal size, shape & consistency.  Non tender, mobile, & no prolapse, No fibroids " palpable  Adnexa- No mass, non tender  Anus/Rectum/Perineum- Normal appearance, no mass, good sphincter tone, no hemorrhoids, no prolapse    Lymphatic- No palpable neck, axillary, or groin nodes  Ext- No edema, no cyanosis    Skin- No lesions, no rashes, no acanthosis nigricans      ASSESSMENT and PLAN:    Diagnoses and all orders for this visit:    1. Well woman exam (Primary)  -     IgP, Aptima HPV  -     DEXA Bone Density Axial; Future    2. Perimenopause symptoms  Assessment & Plan:  We discussed today perimenopause and typical symptoms.  Sleep despite [place]'s is one of the most common symptoms associated with perimenopause.  Prometrium in particular is helpful with respect to sleep.  We discussed alternative options that are nonhormonal and she declines.    We discussed in detail today and her questions were answered regarding HRT: Synthetic, bioidentical, natural, compounded.  She had 2 creams that she brought with her and wanted me to review.  Both are natural sources for example yams and are not FDA regulated.  This was discussed.  Also discussed that current studies do not indicate improvement in symptoms, however I have had patients who feel her symptoms are improved with over-the-counter natural menopausal supplements.    She also had questions regarding checking hormone levels.  We discussed it is best to treat clinical symptoms and not replace hormones to normal levels or even check hormones as they can fluctuate.    Orders:  -     estradiol (CLIMARA) 0.05 MG/24HR patch; Place 1 patch on the skin as directed by provider 1 (One) Time Per Week.  Dispense: 4 patch; Refill: 1  -     Progesterone (Prometrium) 100 MG capsule; Take 1 capsule by mouth Daily.  Dispense: 30 capsule; Refill: 1    3. Insomnia, unspecified type    4. Cervical polyp  -     Tissue Pathology Exam        Preventative:  BREAST HEALTH- Monthly self breast exam importance and how to reviewed. MMG and/or MRI (prn) reviewed per society  guidelines and her individual history. Screen: Already up to date  CERVICAL CANCER Screening- Reviewed current ASCCP guidelines for screening w and wo cotest HPV, age specific.  Screen: Updated today  COLON CANCER Screening- Reviewed current medical society guidelines and options.  Screen:  Already up to date  Importance of WEIGHT MANAGEMENT, nutrition, and exercise reviewed  BONE HEALTH- Reviewed current medical society guidelines and options for testing, calcium and vit D intake.  Weight bearing exercise.  DEXA: Updated today  VACCINATIONS Recommended: Covid vaccine, Flu annually, Tdap [id number], Zoster (51yo and older).  Importance discussed, risk being unvaccinated reviewed.  Questions answered  Smoking status- NON SMOKER  Follow up PCP/Specialist PMHx and/or Labs    HRT R/B/A/SE/E all options including non-FDA approved options discussed w pt.         HRT- I recommend the lowest dose possible, for the shortest period of time.  Risks HRT NLT breast CA (progestin), CVA, MI, TONJA.      She understands the importance of having any ordered tests to be performed in a timely fashion.  The risks of not performing them include, but are not limited to, advanced cancer stages, bone loss from osteoporosis and/or subsequent increase in morbidity and/or mortality.  She is encouraged to review her results online and/or contact or office if she has questions.     Follow Up:  Return in about 4 weeks (around 11/29/2023) for FU MEDS and 1 year WWE.            Niurka Abreu, DO  11/01/2023    [facility]  [facility] [place] [address]  Dept: [phone]  Dept Fax: [phone]  Loc: [phone]  Loc Fax: [phone]

## 2023-11-01 ENCOUNTER — OFFICE VISIT (OUTPATIENT)
Dept: OBSTETRICS AND GYNECOLOGY | Facility: CLINIC | Age: 58
End: 2023-11-01
Payer: OTHER GOVERNMENT

## 2023-11-01 VITALS
BODY MASS INDEX: 24.99 KG/M2 | DIASTOLIC BLOOD PRESSURE: 74 MMHG | WEIGHT: 150 LBS | HEIGHT: 65 IN | HEART RATE: 84 BPM | SYSTOLIC BLOOD PRESSURE: 125 MMHG

## 2023-11-01 DIAGNOSIS — G47.00 INSOMNIA, UNSPECIFIED TYPE: ICD-10-CM

## 2023-11-01 DIAGNOSIS — N95.1 PERIMENOPAUSE: ICD-10-CM

## 2023-11-01 DIAGNOSIS — N84.1 CERVICAL POLYP: ICD-10-CM

## 2023-11-01 DIAGNOSIS — Z01.419 WELL WOMAN EXAM: Primary | ICD-10-CM

## 2023-11-01 PROCEDURE — G0123 SCREEN CERV/VAG THIN LAYER: HCPCS

## 2023-11-01 PROCEDURE — 88305 TISSUE EXAM BY PATHOLOGIST: CPT | Performed by: OBSTETRICS & GYNECOLOGY

## 2023-11-01 PROCEDURE — 87624 HPV HI-RISK TYP POOLED RSLT: CPT

## 2023-11-01 RX ORDER — ESTRADIOL 0.05 MG/D
1 PATCH TRANSDERMAL WEEKLY
Qty: 4 PATCH | Refills: 1 | Status: SHIPPED | OUTPATIENT
Start: 2023-11-01

## 2023-11-01 RX ORDER — PROGESTERONE 100 MG/1
100 CAPSULE ORAL DAILY
Qty: 30 CAPSULE | Refills: 1 | Status: SHIPPED | OUTPATIENT
Start: 2023-11-01

## 2023-11-01 NOTE — ASSESSMENT & PLAN NOTE
We discussed today perimenopause and typical symptoms.  Sleep despite D[place]'s is one of the most common symptoms associated with perimenopause.  Prometrium in particular is helpful with respect to sleep.  We discussed alternative options that are nonhormonal and she declines.    We discussed in detail today and her questions were answered regarding HRT: Synthetic, bioidentical, natural, compounded.  She had 2 creams that she brought with her and wanted me to review.  Both are natural sources for example yams and are not FDA regulated.  This was discussed.  Also discussed that current studies do not indicate improvement in symptoms, however I have had patients who feel her symptoms are improved with over-the-counter natural menopausal supplements.    She also had questions regarding checking hormone levels.  We discussed it is best to treat clinical symptoms and not replace hormones to normal levels or even check hormones as they can fluctuate.

## 2023-11-02 ENCOUNTER — OFFICE VISIT (OUTPATIENT)
Dept: FAMILY MEDICINE CLINIC | Facility: CLINIC | Age: 58
End: 2023-11-02
Payer: OTHER GOVERNMENT

## 2023-11-02 VITALS
HEART RATE: 68 BPM | HEIGHT: 65 IN | TEMPERATURE: 97.8 F | DIASTOLIC BLOOD PRESSURE: 62 MMHG | OXYGEN SATURATION: 97 % | WEIGHT: 150 LBS | BODY MASS INDEX: 24.99 KG/M2 | SYSTOLIC BLOOD PRESSURE: 118 MMHG

## 2023-11-02 DIAGNOSIS — I83.12 VARICOSE VEINS OF LEFT LOWER EXTREMITY WITH INFLAMMATION: Primary | ICD-10-CM

## 2023-11-02 DIAGNOSIS — I82.890 SUPERFICIAL VEIN THROMBOSIS: ICD-10-CM

## 2023-11-02 PROCEDURE — 99214 OFFICE O/P EST MOD 30 MIN: CPT | Performed by: NURSE PRACTITIONER

## 2023-11-02 NOTE — PROGRESS NOTES
Chief Complaint  ER Visit ([facility] 10/26/2023, Dx: Superficial vein thrombosis)    Subjective        Medical History: has a past medical history of Arthritis, Cervical pain (neck) (2020), Deep vein thrombosis, Disease of thyroid gland, Headache, Leiomyoma, Lipoma of right upper extremity (2021), Neural foraminal stenosis of cervical spine (2020), Right hip pain (2021), Right patella fracture (CURRENTLY), and Varicose veins of lower extremity with pain, left (2020).     Surgical History: has a past surgical history that includes Gallbladder surgery (2018); Lipoma Excision; Patella Open Reduction Internal Fixation (Right, 2021); Sinus surgery; and Knee surgery.     Family History: family history includes Alzheimer's disease in her mother; Breast cancer in an other family member; Lung cancer in her father; Prostate cancer in her father.     Social History: reports that she quit smoking about 24 years ago. Her smoking use included cigarettes. She has a 1.00 pack-year smoking history. She has never used smokeless tobacco. She reports that she does not currently use alcohol. She reports that she does not use drugs.    Leola Montana presents to [facility]  History of Present Illness     Patient is a 57-year-old female who went to the emergency department with complaints of left leg pain and swelling.  She was initially seen in the urgent care, due to concerns of DVT patient was referred over to ER.  Venous Doppler of the left leg showed superficial venous thrombosis at the greater saphenous distal to the knee.  Patient comes in today for follow-up.    Patient continues to have pain, she states the swelling has improved today rated pain 7 out of 10.  Patient does not have a history of varicose veins, likely the cause of superficial venous thrombosis.  Patient is concerned because her aunt  of a DVT, I discussed with patient that superficial means that it is  "not in a deep vein and likely not to cause any complications.  Patient does have a varicose vein in that leg, she would like further assessment, she has seen somebody previously for varicose vein in [place].  .   Objective   Vital Signs:   /62 (BP Location: Right arm, Patient Position: Sitting, Cuff Size: Adult)   Pulse 68   Temp 97.8 °F (36.6 °C) (Temporal)   Ht 165.1 cm (65\")   Wt 68 kg (150 lb)   SpO2 97%   BMI 24.96 kg/m²       Wt Readings from Last 3 Encounters:   11/02/23 68 kg (150 lb)   11/01/23 68 kg (150 lb)   10/26/23 66.5 kg (146 lb 9.7 oz)        BP Readings from Last 3 Encounters:   11/02/23 118/62   11/01/23 125/74   10/26/23 121/74        BMI is within normal parameters. No other follow-up for BMI required.       Physical Exam  Vitals reviewed.   Constitutional:       Appearance: Normal appearance. She is well-developed.   HENT:      Head: Normocephalic and atraumatic.   Eyes:      Conjunctiva/sclera: Conjunctivae normal.      Pupils: Pupils are equal, round, and reactive to light.   Cardiovascular:      Rate and Rhythm: Normal rate and regular rhythm.      Heart sounds: No murmur heard.  Pulmonary:      Effort: Pulmonary effort is normal.      Breath sounds: Normal breath sounds. No wheezing or rhonchi.   Musculoskeletal:      Comments: Tenderness with palpation mild inflammation over the left medial aspect just distal to the knee that refers up until patient's lower thigh, swelling improving   Skin:     General: Skin is warm and dry.   Neurological:      Mental Status: She is alert and oriented to person, place, and time.   Psychiatric:         Mood and Affect: Mood and affect normal.         Behavior: Behavior normal.         Thought Content: Thought content normal.         Judgment: Judgment normal.        Result Review :  {The following data was reviewed by CHEYANNE West on 11/02/2023.  Common labs          2/14/2023    08:19 7/12/2023    08:28 10/26/2023    " 16:41   Common Labs   Glucose 96   97    BUN 19   14    Creatinine 0.56   0.67    Sodium 139   139    Potassium 4.1   4.2    Chloride 103   102    Calcium 10.0   9.8    Albumin 4.8   4.4    Total Bilirubin 0.5   0.5    Alkaline Phosphatase 76   67    AST (SGOT) 22   27    ALT (SGPT) 18   26    WBC 5.50   9.54    Hemoglobin 14.2   12.1    Hematocrit 41.5   36.5    Platelets 342   276    Total Cholesterol 206  204     Triglycerides 94  112     HDL Cholesterol 71  51     LDL Cholesterol  118  133       Data reviewed : Reviewed his ER note and venous Doppler             Current Outpatient Medications on File Prior to Visit   Medication Sig Dispense Refill    estradiol (CLIMARA) 0.05 MG/24HR patch Place 1 patch on the skin as directed by provider 1 (One) Time Per Week. 4 patch 1    fluticasone (FLONASE) 50 MCG/ACT nasal spray 2 sprays into the nostril(s) as directed by provider Daily As Needed for Rhinitis or Allergies. 15 mL 4    ibuprofen (ADVIL,MOTRIN) 600 MG tablet Take 1 tablet by mouth Every 8 (Eight) Hours As Needed for Mild Pain. 30 tablet 0    Progesterone (Prometrium) 100 MG capsule Take 1 capsule by mouth Daily. 30 capsule 1     No current facility-administered medications on file prior to visit.    Interpretation Summary         Acute left lower extremity superficial thrombophlebitis noted in the great saphenous (below knee) and varicosity (below knee).    All other left sided veins appeared normal.    ight Common Femoral: No deep vein thrombosis noted.      Left Common Femoral: No deep vein thrombosis noted.    Left Saphenofemoral Junction: No superficial thrombophlebitis noted.    Left Proximal Femoral: No deep vein thrombosis noted.    Left Mid Femoral: No deep vein thrombosis noted.    Left Distal Femoral: No deep vein thrombosis noted.    Left Popliteal: No deep vein thrombosis noted.    Left Posterior Tibial: No deep vein thrombosis noted.    Left Peroneal: No deep vein thrombosis noted.    Left  Gastrocnemius: No deep vein thrombosis noted.    Left Great Saphenous Above Knee: No superficial thrombophlebitis noted.    Left Great Saphenous Below Knee: Acute superficial thrombophlebitis noted.    Left Varicosity Below Knee: Acute superficial thrombophlebitis noted    Assessment and Plan  Diagnoses and all orders for this visit:    1. Varicose veins of left lower extremity with inflammation (Primary)  -     Ambulatory Referral to Vascular Surgery    2. Superficial vein thrombosis  -     Ambulatory Referral to Vascular Surgery    Other orders  -     Diclofenac Sodium (VOLTAREN) 1 % gel gel; Apply 4 g topically to the appropriate area as directed 4 (Four) Times a Day As Needed (leg pain).  Dispense: 100 g; Refill: 1    On evaluation of the SVT today patient's swelling has improved, it is not hot to touch, she still rates the pain at a 7 of 10.  She is currently on ibuprofen 600 mg every 8 hours she has been using warm compresses with minimal effect.,  Patient does have a current history of varicose veins of the left lower extremity with inflammation and would like a referral back up to [facility] to discuss treatment options for the varicose veins.  Discussed strict return precautions.  Patient verbalized understanding agreeable treatment plan.    Follow Up   Return for If symptoms do not improve new concerning symptoms.  Patient was given instructions and counseling regarding her condition or for health maintenance advice. Please see specific information pulled into the AVS if appropriate.       Part of this note may be electronic transcription/translation of spoken language to printed text using the Dragon dictation system

## 2023-11-06 ENCOUNTER — TELEPHONE (OUTPATIENT)
Dept: OBSTETRICS AND GYNECOLOGY | Facility: CLINIC | Age: 58
End: 2023-11-06
Payer: OTHER GOVERNMENT

## 2023-11-06 LAB
CYTOLOGIST CVX/VAG CYTO: NORMAL
CYTOLOGY CVX/VAG DOC CYTO: NORMAL
CYTOLOGY CVX/VAG DOC THIN PREP: NORMAL
DX ICD CODE: NORMAL
HIV 1 & 2 AB SER-IMP: NORMAL
HPV I/H RISK 4 DNA CVX QL PROBE+SIG AMP: NEGATIVE
OTHER STN SPEC: NORMAL
STAT OF ADQ CVX/VAG CYTO-IMP: NORMAL

## 2023-11-06 NOTE — TELEPHONE ENCOUNTER
----- Message from Niurka Abreu DO sent at 11/3/2023  5:49 PM EDT -----  Please let patient know cervical polyp was benign.  Keep follow-up as scheduled, should be in about 4 weeks to follow-up medications.

## 2023-11-17 NOTE — PROGRESS NOTES
"GYN Visit    Chief Complaint   Patient presents with    Follow-up     Fu on meds        HPI:   57 y.o. LMP: No LMP recorded. Patient is premenopausal.     Social History     Substance and Sexual Activity   Sexual Activity Yes    Partners: Male    Birth control/protection: None     Perimenopause, LMP 2023    Office Visit with Niurka Abreu DO (2023)  Seen for well woman exam earlier this month.  Complained of hot flashes and night sweats along with waking in the middle of the night.  Patient prescribed Climara patch 0.05 mg and Prometrium 100 mg daily    Tried for 2 weeks, breast pain and stopped it.  Had VB 3 days when stopped it. Sleep was better, did wake, but able to fall back asleep.  Now off patch and prometrium, still waking, but getting back to sleep better.     History: PMHx, Meds, Allergies, PSHx, Social Hx, and POBHx all reviewed and updated.    PHYSICAL EXAM:  /70   Pulse 69   Ht 165.1 cm (65\")   Wt 66.7 kg (147 lb)   BMI 24.46 kg/m²   Facility age limit for growth %miriam is 20 years.  General- NAD, alert and oriented, appropriate  Psych- Normal mood, good memory      ASSESSMENT AND PLAN:  Diagnoses and all orders for this visit:    1. Perimenopause (Primary)    2. Insomnia    3. Thrombophlebitis of superficial veins of left lower extremity    We discussed I suspect her vaginal bleeding after coming off Prometrium is secondary to withdrawal bleed and she is not completely in menopause, LMP March of this year.  She understands the importance of following up immediately for any more vaginal bleeding, risk of endometrial malignancy discussed.  Patient agrees.      We discussed insomnia may not be hormonal related, I recommend she follow-up with PCP for treatment options.  We discussed nonhormonal routes to include Brisdelle, and off label Effexor for perimenopause.  Patient notes today she has a chronic superficial thrombophlebitis, epic has been updated.  Secondary to that I would " not recommend hormone replacement therapy, and would prefer alternative nonhormonal options first.    She could consider over-the-counter perimenopause symptom relievers including black cohosh, yams and/or other.        Follow Up:  Return if symptoms worsen or fail to improve.          Niurka Abreu,   11/27/2023    [facility]  [facility] [place] [address]  Dept: [phone]  Dept Fax: [phone]  Loc: [phone]  Loc Fax: [phone]

## 2023-11-27 ENCOUNTER — OFFICE VISIT (OUTPATIENT)
Dept: OBSTETRICS AND GYNECOLOGY | Facility: CLINIC | Age: 58
End: 2023-11-27
Payer: OTHER GOVERNMENT

## 2023-11-27 VITALS
WEIGHT: 147 LBS | SYSTOLIC BLOOD PRESSURE: 116 MMHG | HEIGHT: 65 IN | BODY MASS INDEX: 24.49 KG/M2 | HEART RATE: 69 BPM | DIASTOLIC BLOOD PRESSURE: 70 MMHG

## 2023-11-27 DIAGNOSIS — N95.1 PERIMENOPAUSE: Primary | ICD-10-CM

## 2023-11-27 DIAGNOSIS — I80.02 THROMBOPHLEBITIS OF SUPERFICIAL VEINS OF LEFT LOWER EXTREMITY: ICD-10-CM

## 2023-11-27 DIAGNOSIS — G47.09 OTHER INSOMNIA: ICD-10-CM

## 2023-11-27 PROBLEM — I80.9 SUPERFICIAL THROMBOPHLEBITIS: Status: ACTIVE | Noted: 2023-11-27

## 2023-11-27 PROCEDURE — 99213 OFFICE O/P EST LOW 20 MIN: CPT | Performed by: OBSTETRICS & GYNECOLOGY

## 2023-12-13 ENCOUNTER — HOSPITAL ENCOUNTER (OUTPATIENT)
Dept: BONE DENSITY | Facility: HOSPITAL | Age: 58
Discharge: HOME | End: 2023-12-13
Admitting: OBSTETRICS & GYNECOLOGY
Payer: OTHER GOVERNMENT

## 2023-12-13 DIAGNOSIS — Z01.419 WELL WOMAN EXAM: ICD-10-CM

## 2023-12-13 PROCEDURE — 77080 DXA BONE DENSITY AXIAL: CPT

## 2023-12-14 ENCOUNTER — TELEPHONE (OUTPATIENT)
Dept: OBSTETRICS AND GYNECOLOGY | Facility: CLINIC | Age: 58
End: 2023-12-14
Payer: OTHER GOVERNMENT

## 2023-12-14 NOTE — TELEPHONE ENCOUNTER
----- Message from Niurka Abreu DO sent at 12/14/2023  9:26 AM EST -----  DEXA indicates osteopenia (not osteoporosis).  I recommend maximizing calcium in diet, eg milk, cheeses, yogurt, and spinach.  I recommend a calcium supplement OTC with vitamin D if she is not getting at least 1200mg calcium/day in diet daily.  Weight bearing exercise is helpful along with muscle strengthening exercises as much as her PCP will allow.    Smoking cessation if she smokes and limiting alcohol intake is also recommended.  Plan FU DEXA in 2years

## 2024-03-20 ENCOUNTER — OFFICE VISIT (OUTPATIENT)
Dept: FAMILY MEDICINE CLINIC | Facility: CLINIC | Age: 59
End: 2024-03-20
Payer: OTHER GOVERNMENT

## 2024-03-20 VITALS
HEART RATE: 75 BPM | TEMPERATURE: 97.3 F | BODY MASS INDEX: 23.32 KG/M2 | DIASTOLIC BLOOD PRESSURE: 70 MMHG | OXYGEN SATURATION: 97 % | HEIGHT: 65 IN | WEIGHT: 140 LBS | SYSTOLIC BLOOD PRESSURE: 122 MMHG

## 2024-03-20 DIAGNOSIS — E55.9 VITAMIN D DEFICIENCY: ICD-10-CM

## 2024-03-20 DIAGNOSIS — Z13.220 LIPID SCREENING: ICD-10-CM

## 2024-03-20 DIAGNOSIS — Z12.4 CERVICAL CANCER SCREENING: ICD-10-CM

## 2024-03-20 DIAGNOSIS — Z13.29 THYROID DISORDER SCREENING: ICD-10-CM

## 2024-03-20 DIAGNOSIS — E78.2 MIXED HYPERLIPIDEMIA: ICD-10-CM

## 2024-03-20 DIAGNOSIS — M25.561 BILATERAL CHRONIC KNEE PAIN: ICD-10-CM

## 2024-03-20 DIAGNOSIS — I82.890 SUPERFICIAL VEIN THROMBOSIS: ICD-10-CM

## 2024-03-20 DIAGNOSIS — Z00.00 ANNUAL PHYSICAL EXAM: Primary | ICD-10-CM

## 2024-03-20 DIAGNOSIS — M25.562 BILATERAL CHRONIC KNEE PAIN: ICD-10-CM

## 2024-03-20 DIAGNOSIS — G89.29 BILATERAL CHRONIC KNEE PAIN: ICD-10-CM

## 2024-03-20 DIAGNOSIS — Z12.31 ENCOUNTER FOR SCREENING MAMMOGRAM FOR MALIGNANT NEOPLASM OF BREAST: ICD-10-CM

## 2024-03-20 RX ORDER — CALCIUM CARBONATE/VITAMIN D3 600 MG-10
1 TABLET ORAL DAILY
Qty: 90 TABLET | Refills: 2 | Status: SHIPPED | OUTPATIENT
Start: 2024-03-20

## 2024-03-20 RX ORDER — FLUTICASONE PROPIONATE 50 MCG
2 SPRAY, SUSPENSION (ML) NASAL DAILY PRN
Qty: 15 ML | Refills: 4 | Status: SHIPPED | OUTPATIENT
Start: 2024-03-20

## 2024-03-20 NOTE — PROGRESS NOTES
"  ENCOUNTER DATE:  03/20/2024    Leola Graves / 58 y.o. / female      CHIEF COMPLAINT     Annual Exam, Referral Request ([facility]o [facility] - Dr. Mason Perkins /Gyn [facility] -  Niurka Abreu/Vascular [facility] - Annia Rodriguez), and Med Refill      VITALS     Visit Vitals  /70 (BP Location: Right arm, Patient Position: Sitting, Cuff Size: Adult)   Pulse 75   Temp 97.3 °F (36.3 °C) (Infrared)   Ht 165.1 cm (65\")   Wt 63.5 kg (140 lb)   SpO2 97%   BMI 23.30 kg/m²       BP Readings from Last 3 Encounters:   03/20/24 122/70   11/27/23 116/70   11/02/23 118/62     Wt Readings from Last 3 Encounters:   03/20/24 63.5 kg (140 lb)   11/27/23 66.7 kg (147 lb)   11/02/23 68 kg (150 lb)      Body mass index is 23.3 kg/m².    MEDICATIONS     Current Outpatient Medications on File Prior to Visit   Medication Sig Dispense Refill    ibuprofen (ADVIL,MOTRIN) 600 MG tablet Take 1 tablet by mouth Every 8 (Eight) Hours As Needed for Mild Pain. 30 tablet 0    [DISCONTINUED] Diclofenac Sodium (VOLTAREN) 1 % gel gel Apply 4 g topically to the appropriate area as directed 4 (Four) Times a Day As Needed.      [DISCONTINUED] fluticasone (FLONASE) 50 MCG/ACT nasal spray 2 sprays into the nostril(s) as directed by provider Daily As Needed for Rhinitis or Allergies. 15 mL 4     No current facility-administered medications on file prior to visit.         HISTORY OF PRESENT ILLNESS     Leola presents for annual health maintenance visit.  Lab Results   Component Value Date    HPVAPTIMA Negative 11/01/2023      Last health maintenance visit: approximately 1 year ago  General health: good  Lifestyle:  Attempting to lose weight?: Yes   Diet: eats a well balanced, healthy diet  Exercise: exercises 5+ days weekly  Tobacco: Never used   Alcohol: does not drink  Work: Retired  Magi/Postmenopausal?: Yes   Domestic abuse concerns: No   Depression Screening:          PHQ-9 Depression Screening  Little interest or pleasure in doing things? 0-->not at all "   Feeling down, depressed, or hopeless? 0-->not at all   Trouble falling or staying asleep, or sleeping too much?     Feeling tired or having little energy?     Poor appetite or overeating?     Feeling bad about yourself - or that you are a failure or have let yourself or your family down?     Trouble concentrating on things, such as reading the newspaper or watching television?     Moving or speaking so slowly that other people could have noticed? Or the opposite - being so fidgety or restless that you have been moving around a lot more than usual?     Thoughts that you would be better off dead, or of hurting yourself in some way?     PHQ-9 Total Score 0   If you checked off any problems, how difficult have these problems made it for you to do your work, take care of things at home, or get along with other people?           LAN-7           Patient Care Team:  Elissa Miller APRN as PCP - General (Nurse Practitioner)  Karla Sheehan APRN (Inactive) as Nurse Practitioner (Otolaryngology)      ALLERGIES  Allergies   Allergen Reactions    Prometrium [Progesterone] Other (See Comments)     Severe breast tenderness w 100mg        PFSH:     The following portions of the patient's history were reviewed and updated as appropriate: Allergies / Current Medications / Past Medical History / Surgical History / Social History / Family History    PROBLEM LIST   Patient Active Problem List   Diagnosis    Arthritis    Facial aging    Gastrointestinal malfunction arising from mental factors    Headache    Lipoma    Menopausal symptoms    Neural foraminal stenosis of cervical spine    Right hip pain    Thyroid nodule    Varicose veins of left lower extremity with pain    Lipoma of right lower extremity    Closed displaced fracture of right patella    S/P Right Patella Open Reduction Internal Fixation    Aftercare following surgery of right patella ORIF, 8/20/2021    Leiomyoma    Thyroid nodule    Thyroid nodule    It band  syndrome, right    Iron deficiency    Vitamin D insufficiency    Perimenopause    Insomnia    Superficial thrombophlebitis       PAST MEDICAL HISTORY  Past Medical History:   Diagnosis Date    Arthritis     Cervical pain (neck) 2020    Headache     Leiomyoma     21 fibroid x2, 4.3cm subserosal and 3.8cm lat/post    Leiomyoma     Lipoma of right upper extremity 2021    hip/side    Neural foraminal stenosis of cervical spine 2020    Right hip pain 2021    Right patella fracture CURRENTLY    Superficial thrombophlebitis     Varicose veins of lower extremity with pain, left 2020       SURGICAL HISTORY  Past Surgical History:   Procedure Laterality Date    GALLBLADDER SURGERY  2018    KNEE SURGERY      LIPOMA EXCISION      neck 10 yrs ago    PATELLA OPEN REDUCTION INTERNAL FIXATION Right 2021    Procedure: PATELLA OPEN REDUCTION INTERNAL FIXATION;  Surgeon: Scot Cuevas MD;  Location: [facility] MAIN OR;  Service: Orthopedics;  Laterality: Right;    SINUS SURGERY         SOCIAL HISTORY  Social History     Socioeconomic History    Marital status:    Tobacco Use    Smoking status: Former     Current packs/day: 0.00     Average packs/day: 0.2 packs/day for 5.0 years (1.0 ttl pk-yrs)     Types: Cigarettes     Start date:      Quit date:      Years since quittin.2    Smokeless tobacco: Never    Tobacco comments:     smoked 5 years, quite 30 years ago   Vaping Use    Vaping status: Never Used   Substance and Sexual Activity    Alcohol use: Not Currently     Comment: occasionally drinks     Drug use: Never    Sexual activity: Yes     Partners: Male     Birth control/protection: None       FAMILY HISTORY  Family History   Problem Relation Age of Onset    Prostate cancer Father     Lung cancer Father     Alzheimer's disease Mother     Breast cancer Maternal Aunt     Deep vein thrombosis Maternal Aunt     Ovarian cancer Neg Hx     Uterine cancer Neg Hx     Colon  cancer Neg Hx     Pulmonary embolism Neg Hx        IMMUNIZATION HISTORY  Immunization History   Administered Date(s) Administered    COVID-19 (PFIZER) BIVALENT 12+YRS 11/05/2022    COVID-19 (PFIZER) Purple Cap Monovalent 03/22/2021, 04/08/2021, 11/10/2021    Flu Vaccine Intradermal Quad 18-64YR 09/01/2020    Flu Vaccine Quad PF >36MO 09/21/2018, 09/26/2020, 09/20/2021    Fluzone (or Fluarix & Flulaval for VFC) >6mos 09/11/2023    Influenza, Unspecified 09/09/2022    Shingrix 01/22/2020, 01/22/2020, 03/27/2020, 03/27/2020    Tdap 01/18/2022       HPI  HPI  Elevated Cholesterol  Patient was seen in office 4 months ago, at that time her total cholesterol and LDL were both elevated I recommend a low-cholesterol diet avoid greasy fatty foods increase exercise and weight loss and to follow back up in 3 months.  Patient comes in today for 3-month follow-up.  She denies eating any greasy / fatty or fried foods and states she eats a lot of veggies, chicken and fish. She does admit that her physical activity has changed a lot in comparison to three years ago when her cholesterol levels were normal. She does state that she has began exercising again and is planning to increase her activity level further.   She does report that her father may have elevated cholesterol levels.     Patient is needing updated  referrals.  She sees Dr. Cuevas orthopedic surgeon for bilateral knee pain, she sees Dr. Abreu GYN for cervical cancer screening and a new vascular referral for superficial thrombosis Dr. Rodriguez.  She is stable with these providers, she is only needing updated  referrals at this time.      We did discuss patient's DEXA scan, she does have osteopenia I did discuss starting on a calcium supplement daily and then increase calcium in diet.  Encourage patient to continue to exercise on a regular basis and repeat DEXA scan in 2 years.  Patient verbalized understanding.      Physical Exam  Vitals reviewed.  "  Constitutional:       General: She is not in acute distress.     Appearance: Normal appearance. She is well-developed and normal weight. She is not ill-appearing.   HENT:      Head: Normocephalic and atraumatic.   Eyes:      Conjunctiva/sclera: Conjunctivae normal.      Pupils: Pupils are equal, round, and reactive to light.   Cardiovascular:      Rate and Rhythm: Normal rate and regular rhythm.      Heart sounds: No murmur heard.  Pulmonary:      Effort: Pulmonary effort is normal.      Breath sounds: Normal breath sounds. No wheezing.   Musculoskeletal:      Right lower leg: No edema.      Left lower leg: No edema.   Skin:     General: Skin is warm and dry.   Neurological:      Mental Status: She is alert and oriented to person, place, and time.   Psychiatric:         Mood and Affect: Mood and affect normal.         Behavior: Behavior normal.         Thought Content: Thought content normal.         Judgment: Judgment normal.         REVIEWED DATA      Labs:    Lab Results   Component Value Date     10/26/2023    K 4.2 10/26/2023    CALCIUM 9.8 10/26/2023    AST 27 10/26/2023    ALT 26 10/26/2023    BUN 14 10/26/2023    CREATININE 0.67 10/26/2023    CREATININE 0.56 (L) 02/14/2023    CREATININE 0.60 01/20/2022    EGFRIFNONA 103 01/20/2022       Lab Results   Component Value Date    TSH 0.424 02/14/2023    FREET4 1.29 01/20/2022       Lab Results   Component Value Date     (H) 07/12/2023    HDL 51 07/12/2023    TRIG 112 07/12/2023    CHOLHDLRATIO 3.9 02/04/2021       Lab Results   Component Value Date    QAPA33RF 41.4 02/14/2023        Lab Results   Component Value Date    WBC 9.54 10/26/2023    HGB 12.1 10/26/2023    MCV 89.5 10/26/2023     10/26/2023       No results found for: \"PROTEIN\", \"GLUCOSEU\", \"BLOODU\", \"NITRITEU\", \"LEUKOCYTESUR\"     No results found for: \"HEPCVIRUSABY\"    PROCEDURE:DEXA BONE DENSITY AXIAL     COMPARISON:None     TECHNIQUE:    Lumbar vertebral and proximal femoral " dual-energy X-ray absorptiometry (DXA) was   performed on a [employer] device.    INDICATIONS:OSTEOPOROSIS SCREENING     FINDINGS:          Lumbar Spine  The BMD measured in the L1-L4 region is 0.946 g/cm2 with a T-score of -2.0. This patient is   considered osteopenic according to World Health Organization (WHO) criteria.      Femoral Neck  The BMD measured at the left femoral neck is 0.991 g/cm2 with a T-score of -0.3. The BMD measured   at the right femoral neck is 1.034 g/cm2 with a T-score of 0.0. The BMD of the mean femoral neck is   1.013 g/cm2 with a T score of  -0.2. This patient is considered normal according to World Health   Organization (WHO) criteria.      Total Hip  The BMD of the total left hip is 0.966 g/cm2 with a T-score of -0.3. The BMD of the total right hip   is 1.001 g/cm2 with a T-score of 0.0. The BMD of the mean total hip is 0.984 g/cm2 with a T-score   of    -0.2. This patient is considered normal according to World Health Organization (WHO) criteria.     FRAX Score: The estimated 10 year risk of a major osteoporotic fracture is calculated to be 10.1%   and a hip fracture of 0.3%.       CONCLUSION  Osteopenia -      Based upon the FRAX score, patient does not meet criteria for initiation of   pharmacological treatment recommendations for prevention of osteoporosis per the National   Osteoporosis Foundation (NOF) guidelines. However, individualized treatment decisions should be   made accounting for additional clinical factors.            PAM MEDLEY         Electronically Signed and Approved By: Jarret Whittaker M.D. on 12/14/2023 at 8:20            ASSESSMENT & PLAN     Diagnoses and all orders for this visit:    1. Annual physical exam (Primary)    2. Bilateral chronic knee pain  -     Ambulatory Referral to Orthopedic Surgery    3. Superficial vein thrombosis  -     Ambulatory Referral to Vascular Surgery    4. Cervical cancer screening  -     Ambulatory Referral to Obstetrics /  Gynecology    5. Mixed hyperlipidemia  -     Cancel: CBC & Differential  -     Cancel: Comprehensive Metabolic Panel  -     CBC & Differential; Future  -     Comprehensive Metabolic Panel; Future    6. Vitamin D deficiency  -     Cancel: Vitamin D,25-Hydroxy  -     Vitamin D,25-Hydroxy; Future    7. Thyroid disorder screening  -     Cancel: TSH  -     TSH; Future    8. Lipid screening  -     Cancel: Lipid Panel  -     Lipid Panel; Future    9. Encounter for screening mammogram for malignant neoplasm of breast  -     Mammo Screening Digital Tomosynthesis Bilateral With CAD; Future    Other orders  -     Diclofenac Sodium (VOLTAREN) 1 % gel gel; Apply 4 g topically to the appropriate area as directed 4 (Four) Times a Day As Needed (Pain).  Dispense: 200 g; Refill: 3  -     fluticasone (FLONASE) 50 MCG/ACT nasal spray; 2 sprays into the nostril(s) as directed by provider Daily As Needed for Rhinitis or Allergies.  Dispense: 15 mL; Refill: 4  -     calcium carb-cholecalciferol (Calcium 600/Vitamin D) 600-10 MG-MCG tablet per tablet; Take 1 tablet by mouth Daily.  Dispense: 90 tablet; Refill: 2    Will recheck labs today, adjust medication if needed.  Patient needing refills on Voltaren, Flonase.  Will place order for mammogram.  Discussed return precautions patient verbalized understanding is agreeable with current treatment plan.    HEALTHCARE MAINTENANCE ISSUES     Cancer Screening:  Colon: Initial/Next screening at age: CURRENT  Repeat colon cancer screening: every 10 years  Breast: Recommended monthly self exams; annual professional exam  Mammogram: every 1 year  Cervical: pelvic exam as recommended by gyne  Skin: Monthly self skin examination, annual exam by health professional      Lifestyle Counseling:  Lifestyle Modifications: Continue good lifestyle choices/modifications  Safety Issues: Always wear seatbelt, Avoid texting while driving   Use sunscreen, regular skin examination  Recommended annual dental/vision  examination.  Emotional/Stress/Sleep: Reviewed and  given when appropriate    Part of this note may be electronic transcription/translation of spoken language to printed text using the Dragon dictation system

## 2024-03-21 ENCOUNTER — CLINICAL SUPPORT (OUTPATIENT)
Dept: FAMILY MEDICINE CLINIC | Facility: CLINIC | Age: 59
End: 2024-03-21
Payer: OTHER GOVERNMENT

## 2024-03-21 DIAGNOSIS — Z13.220 LIPID SCREENING: ICD-10-CM

## 2024-03-21 DIAGNOSIS — E55.9 VITAMIN D DEFICIENCY: ICD-10-CM

## 2024-03-21 DIAGNOSIS — E78.2 MIXED HYPERLIPIDEMIA: ICD-10-CM

## 2024-03-21 DIAGNOSIS — Z13.29 THYROID DISORDER SCREENING: ICD-10-CM

## 2024-03-21 LAB
25(OH)D3 SERPL-MCNC: 39.1 NG/ML (ref 30–100)
ALBUMIN SERPL-MCNC: 4.4 G/DL (ref 3.5–5.2)
ALBUMIN/GLOB SERPL: 1.7 G/DL
ALP SERPL-CCNC: 59 U/L (ref 39–117)
ALT SERPL W P-5'-P-CCNC: 16 U/L (ref 1–33)
ANION GAP SERPL CALCULATED.3IONS-SCNC: 9.7 MMOL/L (ref 5–15)
AST SERPL-CCNC: 23 U/L (ref 1–32)
BASOPHILS # BLD AUTO: 0.03 10*3/MM3 (ref 0–0.2)
BASOPHILS NFR BLD AUTO: 0.6 % (ref 0–1.5)
BILIRUB SERPL-MCNC: 0.6 MG/DL (ref 0–1.2)
BUN SERPL-MCNC: 18 MG/DL (ref 6–20)
BUN/CREAT SERPL: 29.5 (ref 7–25)
CALCIUM SPEC-SCNC: 9.7 MG/DL (ref 8.6–10.5)
CHLORIDE SERPL-SCNC: 106 MMOL/L (ref 98–107)
CHOLEST SERPL-MCNC: 195 MG/DL (ref 0–200)
CO2 SERPL-SCNC: 24.3 MMOL/L (ref 22–29)
CREAT SERPL-MCNC: 0.61 MG/DL (ref 0.57–1)
DEPRECATED RDW RBC AUTO: 42.8 FL (ref 37–54)
EGFRCR SERPLBLD CKD-EPI 2021: 103.8 ML/MIN/1.73
EOSINOPHIL # BLD AUTO: 0.07 10*3/MM3 (ref 0–0.4)
EOSINOPHIL NFR BLD AUTO: 1.5 % (ref 0.3–6.2)
ERYTHROCYTE [DISTWIDTH] IN BLOOD BY AUTOMATED COUNT: 13.1 % (ref 12.3–15.4)
GLOBULIN UR ELPH-MCNC: 2.6 GM/DL
GLUCOSE SERPL-MCNC: 101 MG/DL (ref 65–99)
HCT VFR BLD AUTO: 39.6 % (ref 34–46.6)
HDLC SERPL-MCNC: 59 MG/DL (ref 40–60)
HGB BLD-MCNC: 13.3 G/DL (ref 12–15.9)
IMM GRANULOCYTES # BLD AUTO: 0.01 10*3/MM3 (ref 0–0.05)
IMM GRANULOCYTES NFR BLD AUTO: 0.2 % (ref 0–0.5)
LDLC SERPL CALC-MCNC: 121 MG/DL (ref 0–100)
LDLC/HDLC SERPL: 2.02 {RATIO}
LYMPHOCYTES # BLD AUTO: 1.61 10*3/MM3 (ref 0.7–3.1)
LYMPHOCYTES NFR BLD AUTO: 33.7 % (ref 19.6–45.3)
MCH RBC QN AUTO: 30 PG (ref 26.6–33)
MCHC RBC AUTO-ENTMCNC: 33.6 G/DL (ref 31.5–35.7)
MCV RBC AUTO: 89.2 FL (ref 79–97)
MONOCYTES # BLD AUTO: 0.34 10*3/MM3 (ref 0.1–0.9)
MONOCYTES NFR BLD AUTO: 7.1 % (ref 5–12)
NEUTROPHILS NFR BLD AUTO: 2.72 10*3/MM3 (ref 1.7–7)
NEUTROPHILS NFR BLD AUTO: 56.9 % (ref 42.7–76)
NRBC BLD AUTO-RTO: 0 /100 WBC (ref 0–0.2)
PLATELET # BLD AUTO: 327 10*3/MM3 (ref 140–450)
PMV BLD AUTO: 9.8 FL (ref 6–12)
POTASSIUM SERPL-SCNC: 4.8 MMOL/L (ref 3.5–5.2)
PROT SERPL-MCNC: 7 G/DL (ref 6–8.5)
RBC # BLD AUTO: 4.44 10*6/MM3 (ref 3.77–5.28)
SODIUM SERPL-SCNC: 140 MMOL/L (ref 136–145)
TRIGL SERPL-MCNC: 84 MG/DL (ref 0–150)
TSH SERPL DL<=0.05 MIU/L-ACNC: 0.56 UIU/ML (ref 0.27–4.2)
VLDLC SERPL-MCNC: 15 MG/DL (ref 5–40)
WBC NRBC COR # BLD AUTO: 4.78 10*3/MM3 (ref 3.4–10.8)

## 2024-03-21 PROCEDURE — 85025 COMPLETE CBC W/AUTO DIFF WBC: CPT | Performed by: NURSE PRACTITIONER

## 2024-03-21 PROCEDURE — 84443 ASSAY THYROID STIM HORMONE: CPT | Performed by: NURSE PRACTITIONER

## 2024-03-21 PROCEDURE — 80061 LIPID PANEL: CPT | Performed by: NURSE PRACTITIONER

## 2024-03-21 PROCEDURE — 82306 VITAMIN D 25 HYDROXY: CPT | Performed by: NURSE PRACTITIONER

## 2024-03-21 PROCEDURE — 80053 COMPREHEN METABOLIC PANEL: CPT | Performed by: NURSE PRACTITIONER

## 2024-03-21 PROCEDURE — 36415 COLL VENOUS BLD VENIPUNCTURE: CPT | Performed by: FAMILY MEDICINE

## 2024-03-25 NOTE — PROGRESS NOTES
Called Leola Montana at [phone] (M)  to relay results, however was unable to reach patient. LVM asking to please return call.

## 2024-04-04 ENCOUNTER — OFFICE VISIT (OUTPATIENT)
Dept: ORTHOPEDIC SURGERY | Facility: CLINIC | Age: 59
End: 2024-04-04
Payer: OTHER GOVERNMENT

## 2024-04-04 VITALS — HEIGHT: 65 IN | BODY MASS INDEX: 23.32 KG/M2 | WEIGHT: 140 LBS

## 2024-04-04 DIAGNOSIS — Z87.81 HISTORY OF FRACTURE OF PATELLA: ICD-10-CM

## 2024-04-04 DIAGNOSIS — M25.562 LEFT KNEE PAIN, UNSPECIFIED CHRONICITY: ICD-10-CM

## 2024-04-04 DIAGNOSIS — M25.561 RIGHT KNEE PAIN, UNSPECIFIED CHRONICITY: Primary | ICD-10-CM

## 2024-04-04 DIAGNOSIS — M17.12 OSTEOARTHRITIS OF LEFT KNEE, UNSPECIFIED OSTEOARTHRITIS TYPE: ICD-10-CM

## 2024-04-04 RX ADMIN — TRIAMCINOLONE ACETONIDE 40 MG: 40 INJECTION, SUSPENSION INTRA-ARTICULAR; INTRAMUSCULAR at 11:00

## 2024-04-04 RX ADMIN — LIDOCAINE HYDROCHLORIDE 5 ML: 10 INJECTION, SOLUTION INFILTRATION; PERINEURAL at 11:00

## 2024-04-04 NOTE — PROGRESS NOTES
"Chief Complaint  Follow-up of the Right Knee and Initial Evaluation of the Left Knee     Subjective      Leola Montana presents to [facility] for follow up of the right knee and initial evaluation of the left knee. She had a ORIF right patella fracture, 2021. She is having pain at the top of the patella.  She has pain in the left knee and exercises a lot and wondering if that is what is bothering her knee. She has pain with driving long distances and prolonged sitting of the right knee.     Allergies   Allergen Reactions    Prometrium [Progesterone] Other (See Comments)     Severe breast tenderness w 100mg        Social History     Socioeconomic History    Marital status:    Tobacco Use    Smoking status: Former     Current packs/day: 0.00     Average packs/day: 0.2 packs/day for 5.0 years (1.0 ttl pk-yrs)     Types: Cigarettes     Start date:      Quit date:      Years since quittin.2    Smokeless tobacco: Never    Tobacco comments:     smoked 5 years, quite 30 years ago   Vaping Use    Vaping status: Never Used   Substance and Sexual Activity    Alcohol use: Not Currently     Comment: occasionally drinks     Drug use: Never    Sexual activity: Yes     Partners: Male     Birth control/protection: None        I reviewed the patient's chief complaint, history of present illness, review of systems, past medical history, surgical history, family history, social history, medications, and allergy list.     Review of Systems     Constitutional: Denies fevers, chills, weight loss  Cardiovascular: Denies chest pain, shortness of breath  Skin: Denies rashes, acute skin changes  Neurologic: Denies headache, loss of consciousness        Vital Signs:   Ht 165.1 cm (65\")   Wt 63.5 kg (140 lb)   BMI 23.30 kg/m²          Physical Exam  General: Alert. No acute distress    Ortho Exam      BILATERAL KNEES Well healed scar to right knee. Flexion 120 right. 130 Extension 0. Stable " to varus/valgus stress. Stable to anterior/posterior drawer. Neurovascularly intact.  . Positive EHL, FHL, HS and TA. Sensation intact to light touch all 5 nerves of the foot. Ambulates with Mildly  Antalgic gait. Patella is well tracking. Calf supple, non-tender. Negative tenderness to the medial joint line. Negative tenderness to the lateral joint line. Positive Crepitus. Good strength to hamstrings, quadriceps, dorsiflexors, and plantar flexors.  Knee Extensor Mechanism intact     Large Joint Arthrocentesis: R knee  Date/Time: 4/4/2024 11:00 AM  Consent given by: patient  Site marked: site marked  Timeout: Immediately prior to procedure a time out was called to verify the correct patient, procedure, equipment, support staff and site/side marked as required   Supporting Documentation  Indications: pain   Procedure Details  Location: knee - R knee  Needle gauge: 21g.  Medications administered: 5 mL lidocaine 1 %; 40 mg triamcinolone acetonide 40 MG/ML  Patient tolerance: patient tolerated the procedure well with no immediate complications      This injection documentation was Scribed for Scot Cuevas MD by Ami Godfrey MA.  04/04/24   11:01 EDT     X-Ray Report:  Right knee X-Ray  Indication: Evaluation of the right knee  AP/Lateral and [place] view(s)  Findings: Intact hardware of the right patella.   No signs of loosening, subsidence small piece of wire has .  Calcification superior to the patella.  Mild to moderate degenerative changes.   Prior studies available for comparison: yes     X-Ray Report:  Left knee X-Ray  Indication: Evaluation of the left knee  AP/Lateral and [place] view(s)  Findings: Mild patella femoral arthritis and medial compartment.  No fracture or dislocation.   Prior studies available for comparison: no       Imaging Results (Most Recent)       Procedure Component Value Units Date/Time    XR Knee 3 View Left [[id number]] Resulted: 04/04/24 0957     Updated: 04/04/24  1001    XR Knee 3 View Right [[id number]] Resulted: 04/04/24 0957     Updated: 04/04/24 1001             Result Review :       No results found.           Assessment and Plan     Diagnoses and all orders for this visit:    1. Right knee pain, unspecified chronicity (Primary)  -     XR Knee 3 View Right    2. Left knee pain, unspecified chronicity  -     XR Knee 3 View Left    3. Osteoarthritis of left knee, unspecified osteoarthritis type    4. History of fracture of patella and ORIF of the right patella.        Discussed the treatment plan with the patient. I reviewed the X-rays that were obtained today with the patient.     Discussed possible removal of the hardware from a ORIF of the right patella.     Discussed the risks and benefits of conservative measures. The patient expressed understanding and wished to proceed with a right knee steroid injection.   She tolerated the injection well.     Call or return if worsening symptoms.    Follow Up     4-6 weeks If injection of the right knee isn't helpful can discuss an excision of the hardware removal. May discuss left knee injection if still having pain.       Patient was given instructions and counseling regarding her condition or for health maintenance advice. Please see specific information pulled into the AVS if appropriate.     Scribed for Scot Cuevas MD by Camila Castro MA.  04/04/24   09:57 EDT    I have personally performed the services described in this document as scribed by the above individual and it is both accurate and complete. Scot Cuevas MD 04/05/24

## 2024-04-05 RX ORDER — TRIAMCINOLONE ACETONIDE 40 MG/ML
40 INJECTION, SUSPENSION INTRA-ARTICULAR; INTRAMUSCULAR
Status: COMPLETED | OUTPATIENT
Start: 2024-04-04 | End: 2024-04-04

## 2024-04-05 RX ORDER — LIDOCAINE HYDROCHLORIDE 10 MG/ML
5 INJECTION, SOLUTION INFILTRATION; PERINEURAL
Status: COMPLETED | OUTPATIENT
Start: 2024-04-04 | End: 2024-04-04

## 2024-05-09 ENCOUNTER — OFFICE VISIT (OUTPATIENT)
Dept: OBSTETRICS AND GYNECOLOGY | Facility: CLINIC | Age: 59
End: 2024-05-09
Payer: OTHER GOVERNMENT

## 2024-05-09 VITALS
DIASTOLIC BLOOD PRESSURE: 63 MMHG | SYSTOLIC BLOOD PRESSURE: 104 MMHG | HEART RATE: 57 BPM | HEIGHT: 65 IN | BODY MASS INDEX: 22.16 KG/M2 | WEIGHT: 133 LBS

## 2024-05-09 DIAGNOSIS — D25.2 SUBSEROUS LEIOMYOMA OF UTERUS: Primary | ICD-10-CM

## 2024-05-09 PROBLEM — G47.00 INSOMNIA: Status: RESOLVED | Noted: 2023-11-01 | Resolved: 2024-05-09

## 2024-05-09 PROBLEM — I80.9 SUPERFICIAL THROMBOPHLEBITIS: Status: RESOLVED | Noted: 2023-11-27 | Resolved: 2024-05-09

## 2024-05-30 ENCOUNTER — OFFICE VISIT (OUTPATIENT)
Dept: ORTHOPEDIC SURGERY | Facility: CLINIC | Age: 59
End: 2024-05-30
Payer: OTHER GOVERNMENT

## 2024-05-30 VITALS
OXYGEN SATURATION: 97 % | HEIGHT: 65 IN | WEIGHT: 133 LBS | BODY MASS INDEX: 22.16 KG/M2 | HEART RATE: 62 BPM | DIASTOLIC BLOOD PRESSURE: 57 MMHG | SYSTOLIC BLOOD PRESSURE: 94 MMHG

## 2024-05-30 DIAGNOSIS — Z87.81 HISTORY OF FRACTURE OF PATELLA: Primary | ICD-10-CM

## 2024-05-30 DIAGNOSIS — M17.11 PRIMARY OSTEOARTHRITIS OF RIGHT KNEE: ICD-10-CM

## 2024-05-30 NOTE — PROGRESS NOTES
"Chief Complaint  Follow-up of the Right Knee and Follow-up of the Left Knee     Subjective      Leola Montana presents to [facility] for a follow up for bilateral knee pain. She had a ORIF right patella fracture, 2021. She is having pain at the top of the patella. She was last seen in the office on 24 where she received a right knee steroid injection. She reports the injection gave her great relief. She reports her pain has improved but still have some tenderness.     Allergies   Allergen Reactions    Prometrium [Progesterone] Other (See Comments)     Severe breast tenderness w 100mg        Social History     Socioeconomic History    Marital status:    Tobacco Use    Smoking status: Former     Current packs/day: 0.00     Average packs/day: 0.2 packs/day for 5.0 years (1.0 ttl pk-yrs)     Types: Cigarettes     Start date:      Quit date:      Years since quittin.4    Smokeless tobacco: Never    Tobacco comments:     smoked 5 years, quite 30 years ago   Vaping Use    Vaping status: Never Used   Substance and Sexual Activity    Alcohol use: Not Currently     Comment: occasionally drinks     Drug use: Never    Sexual activity: Yes     Partners: Male     Birth control/protection: None        I reviewed the patient's chief complaint, history of present illness, review of systems, past medical history, surgical history, family history, social history, medications, and allergy list.     Review of Systems     Constitutional: Denies fevers, chills, weight loss  Cardiovascular: Denies chest pain, shortness of breath  Skin: Denies rashes, acute skin changes  Neurologic: Denies headache, loss of consciousness  MSK: Right knee pain       Vital Signs:   BP 94/57   Pulse 62   Ht 165.1 cm (65\")   Wt 60.3 kg (133 lb)   SpO2 97%   BMI 22.13 kg/m²            Ortho Exam    Physical Exam  General:Alert. No acute distress   BILATERAL KNEES Well healed scar to right knee. " Flexion 120 right. 130 Extension 0. Stable to varus/valgus stress. Stable to anterior/posterior drawer. Neurovascularly intact.  . Positive EHL, FHL, HS and TA. Sensation intact to light touch all 5 nerves of the foot. Ambulates with Mildly  Antalgic gait. Patella is well tracking. Calf supple, non-tender. Negative tenderness to the medial joint line. Negative tenderness to the lateral joint line. Positive Crepitus. Good strength to hamstrings, quadriceps, dorsiflexors, and plantar flexors.  Knee Extensor Mechanism intact        Procedures    Imaging Results (Most Recent)       None             Result Review :       No results found.           Assessment and Plan     Diagnoses and all orders for this visit:    1. History of fracture of patella and ORIF of the right patella. (Primary)    2. Primary osteoarthritis of right knee      The patient presents here today for a follow up for bilateral knee pain.     Discussed operative treatment as a right knee removal of the hardware from a ORIF of the right patella versus non operative treatment options.     Patient wishes to proceed with conservative treatment options and will continue to monitor her knee as her pain has improved.     May consider repeat injection in 8 weeks.     Call or return if worsening symptoms.    Follow Up     8 weeks     Will obtain X-Rays of right knee at next visit.       Patient was given instructions and counseling regarding her condition or for health maintenance advice. Please see specific information pulled into the AVS if appropriate.     Scribed for Scot Cuevas MD by Layla Perry.  05/30/24   09:42 EDT    I have personally performed the services described in this document as scribed by the above individual and it is both accurate and complete. Scot Cuevas MD 05/30/24

## 2024-06-10 ENCOUNTER — HOSPITAL ENCOUNTER (OUTPATIENT)
Dept: MAMMOGRAPHY | Facility: HOSPITAL | Age: 59
Discharge: HOME | End: 2024-06-10
Admitting: NURSE PRACTITIONER
Payer: OTHER GOVERNMENT

## 2024-06-10 DIAGNOSIS — Z12.31 ENCOUNTER FOR SCREENING MAMMOGRAM FOR MALIGNANT NEOPLASM OF BREAST: ICD-10-CM

## 2024-06-10 PROCEDURE — 77063 BREAST TOMOSYNTHESIS BI: CPT

## 2024-06-10 PROCEDURE — 77067 SCR MAMMO BI INCL CAD: CPT

## 2024-07-25 ENCOUNTER — PREP FOR SURGERY (OUTPATIENT)
Dept: OTHER | Facility: HOSPITAL | Age: 59
End: 2024-07-25
Payer: OTHER GOVERNMENT

## 2024-07-25 ENCOUNTER — OFFICE VISIT (OUTPATIENT)
Dept: ORTHOPEDIC SURGERY | Facility: CLINIC | Age: 59
End: 2024-07-25
Payer: OTHER GOVERNMENT

## 2024-07-25 VITALS
WEIGHT: 133 LBS | OXYGEN SATURATION: 98 % | SYSTOLIC BLOOD PRESSURE: 127 MMHG | HEART RATE: 62 BPM | DIASTOLIC BLOOD PRESSURE: 72 MMHG | HEIGHT: 65 IN | BODY MASS INDEX: 22.16 KG/M2

## 2024-07-25 DIAGNOSIS — M25.561 RIGHT KNEE PAIN, UNSPECIFIED CHRONICITY: Primary | ICD-10-CM

## 2024-07-25 DIAGNOSIS — Z87.81 HISTORY OF FRACTURE OF PATELLA: ICD-10-CM

## 2024-07-25 NOTE — PROGRESS NOTES
"Chief Complaint  Follow-up and Pain of the Right Knee     Subjective      Leola Montana presents to [facility] for a follow up for her right knee. She had a ORIF right patella fracture, 2021. She is having pain at the top of the patella and she has a lot of pressure to her knee. She has had prior steroid injection to her right knee in the past with mild relief.     Allergies   Allergen Reactions    Prometrium [Progesterone] Other (See Comments)     Severe breast tenderness w 100mg        Social History     Socioeconomic History    Marital status:    Tobacco Use    Smoking status: Former     Current packs/day: 0.00     Average packs/day: 0.2 packs/day for 5.0 years (1.0 ttl pk-yrs)     Types: Cigarettes     Start date:      Quit date:      Years since quittin.5    Smokeless tobacco: Never    Tobacco comments:     smoked 5 years, quite 30 years ago   Vaping Use    Vaping status: Never Used   Substance and Sexual Activity    Alcohol use: Not Currently     Comment: occasionally drinks     Drug use: Never    Sexual activity: Yes     Partners: Male     Birth control/protection: None        I reviewed the patient's chief complaint, history of present illness, review of systems, past medical history, surgical history, family history, social history, medications, and allergy list.     Review of Systems     Constitutional: Denies fevers, chills, weight loss  Cardiovascular: Denies chest pain, shortness of breath  Skin: Denies rashes, acute skin changes  Neurologic: Denies headache, loss of consciousness  MSK: right knee pain       Vital Signs:   /72   Pulse 62   Ht 165.1 cm (65\")   Wt 60.3 kg (133 lb)   SpO2 98%   BMI 22.13 kg/m²            Ortho Exam    Physical Exam  General:Alert. No acute distress     Right lower extremity: full extension, flexion to 125 degrees, mild crepitus, stable to varus/valgus stress, stable to anterior/posterior drawer, well healed " surgical incision, non tender to the medial joint line  and lateral joint line, neurovascularly intact, calf soft, positive EHL, FHL, GS, and TA. Sensation intact to all 5 nerves of the foot.     Procedures    X-Ray Report:  Right knee X-Ray  Indication: Evaluation of right knee pain   AP/Lateral view(s)  Findings: Intact hardware of the right patella. No signs of loosening, subsidence small piece of wire has . Calcification superior to the patella. Mild to moderate degenerative changes    Prior studies available for comparison: yes       Imaging Results (Most Recent)       Procedure Component Value Units Date/Time    XR Knee AP & Lateral [[id number]] Resulted: 07/25/24 1110     Updated: 07/25/24 1110             Result Review :       No results found.           Assessment and Plan     Diagnoses and all orders for this visit:    1. Right knee pain, unspecified chronicity (Primary)  -     XR Knee AP & Lateral    2. History of fracture of patella and ORIF of the right patella.      The patient presents here today for a follow up for her right knee. X-rays were obtained in the office today and these were reviewed today.     Discussed operative treatment options regarding a right knee hardware removal versus non operative treatment options regarding injections, medications and therapy.     Patient wishes to proceed with operative treatment options. Risks and benefits were discussed regarding a right knee hardware removal. Patient expressed understanding and wishes to proceed.     Discussed surgery., Risks/benefits discussed with patient including, but not limited to: infection, bleeding, neurovascular damage, re-rupture, aesthetic deformity, need for further surgery, and death., Surgery pamphlet given., and Call or return if worsening symptoms.    Follow Up     2 weeks post-operatively      Patient was given instructions and counseling regarding her condition or for health maintenance advice. Please see specific  information pulled into the AVS if appropriate.     Scribed for Scot Cuevas MD by Layla Perry.  07/25/24   11:30 EDT    I have personally performed the services described in this document as scribed by the above individual and it is both accurate and complete. Scot Cuevas MD 07/25/24

## 2024-08-01 ENCOUNTER — OFFICE VISIT (OUTPATIENT)
Dept: FAMILY MEDICINE CLINIC | Facility: CLINIC | Age: 59
End: 2024-08-01
Payer: OTHER GOVERNMENT

## 2024-08-01 VITALS
TEMPERATURE: 97.9 F | WEIGHT: 135.7 LBS | OXYGEN SATURATION: 100 % | SYSTOLIC BLOOD PRESSURE: 110 MMHG | HEART RATE: 70 BPM | DIASTOLIC BLOOD PRESSURE: 78 MMHG | BODY MASS INDEX: 22.61 KG/M2 | HEIGHT: 65 IN

## 2024-08-01 DIAGNOSIS — Z12.83 SCREENING EXAM FOR SKIN CANCER: ICD-10-CM

## 2024-08-01 DIAGNOSIS — D17.1 LIPOMA OF TORSO: ICD-10-CM

## 2024-08-01 DIAGNOSIS — R00.2 HEART PALPITATIONS: ICD-10-CM

## 2024-08-01 DIAGNOSIS — N30.00 ACUTE CYSTITIS WITHOUT HEMATURIA: Primary | ICD-10-CM

## 2024-08-01 LAB
ALBUMIN SERPL-MCNC: 4.3 G/DL (ref 3.5–5.2)
ALBUMIN/GLOB SERPL: 1.6 G/DL
ALP SERPL-CCNC: 51 U/L (ref 39–117)
ALT SERPL W P-5'-P-CCNC: 15 U/L (ref 1–33)
ANION GAP SERPL CALCULATED.3IONS-SCNC: 8.9 MMOL/L (ref 5–15)
AST SERPL-CCNC: 23 U/L (ref 1–32)
BACTERIA UR QL AUTO: NORMAL /HPF
BILIRUB BLD-MCNC: NEGATIVE MG/DL
BILIRUB SERPL-MCNC: 0.3 MG/DL (ref 0–1.2)
BILIRUB UR QL STRIP: NEGATIVE
BUN SERPL-MCNC: 14 MG/DL (ref 6–20)
BUN/CREAT SERPL: 19.7 (ref 7–25)
CALCIUM SPEC-SCNC: 9.1 MG/DL (ref 8.6–10.5)
CHLORIDE SERPL-SCNC: 108 MMOL/L (ref 98–107)
CLARITY UR: CLEAR
CLARITY, POC: CLEAR
CO2 SERPL-SCNC: 24.1 MMOL/L (ref 22–29)
COLOR UR: YELLOW
COLOR UR: YELLOW
CREAT SERPL-MCNC: 0.71 MG/DL (ref 0.57–1)
EGFRCR SERPLBLD CKD-EPI 2021: 98.7 ML/MIN/1.73
GLOBULIN UR ELPH-MCNC: 2.7 GM/DL
GLUCOSE SERPL-MCNC: 83 MG/DL (ref 65–99)
GLUCOSE UR STRIP-MCNC: NEGATIVE MG/DL
GLUCOSE UR STRIP-MCNC: NEGATIVE MG/DL
HGB UR QL STRIP.AUTO: NEGATIVE
HOLD SPECIMEN: NORMAL
HYALINE CASTS UR QL AUTO: NORMAL /LPF
KETONES UR QL STRIP: NEGATIVE
KETONES UR QL: NEGATIVE
LEUKOCYTE EST, POC: ABNORMAL
LEUKOCYTE ESTERASE UR QL STRIP.AUTO: ABNORMAL
MAGNESIUM SERPL-MCNC: 2.2 MG/DL (ref 1.6–2.6)
NITRITE UR QL STRIP: NEGATIVE
NITRITE UR-MCNC: NEGATIVE MG/ML
PH UR STRIP.AUTO: 6 [PH] (ref 5–8)
PH UR: 5.5 [PH] (ref 5–8)
PHOSPHATE SERPL-MCNC: 3.3 MG/DL (ref 2.5–4.5)
POTASSIUM SERPL-SCNC: 4.7 MMOL/L (ref 3.5–5.2)
PROT SERPL-MCNC: 7 G/DL (ref 6–8.5)
PROT UR QL STRIP: NEGATIVE
PROT UR STRIP-MCNC: NEGATIVE MG/DL
RBC # UR STRIP: NEGATIVE /UL
RBC # UR STRIP: NORMAL /HPF
REF LAB TEST METHOD: NORMAL
SODIUM SERPL-SCNC: 141 MMOL/L (ref 136–145)
SP GR UR STRIP: 1.02 (ref 1–1.03)
SP GR UR: 1.02 (ref 1–1.03)
SQUAMOUS #/AREA URNS HPF: NORMAL /HPF
T-UPTAKE NFR SERPL: 1.01 TBI (ref 0.8–1.3)
T4 SERPL-MCNC: 6.56 MCG/DL (ref 4.5–11.7)
TSH SERPL DL<=0.05 MIU/L-ACNC: 0.89 UIU/ML (ref 0.27–4.2)
UROBILINOGEN UR QL STRIP: ABNORMAL
UROBILINOGEN UR QL: ABNORMAL
WBC # UR STRIP: NORMAL /HPF

## 2024-08-01 PROCEDURE — 81002 URINALYSIS NONAUTO W/O SCOPE: CPT | Performed by: NURSE PRACTITIONER

## 2024-08-01 PROCEDURE — 80053 COMPREHEN METABOLIC PANEL: CPT | Performed by: NURSE PRACTITIONER

## 2024-08-01 PROCEDURE — 84443 ASSAY THYROID STIM HORMONE: CPT | Performed by: NURSE PRACTITIONER

## 2024-08-01 PROCEDURE — 99214 OFFICE O/P EST MOD 30 MIN: CPT | Performed by: NURSE PRACTITIONER

## 2024-08-01 PROCEDURE — 81001 URINALYSIS AUTO W/SCOPE: CPT | Performed by: ORTHOPAEDIC SURGERY

## 2024-08-01 PROCEDURE — 93000 ELECTROCARDIOGRAM COMPLETE: CPT | Performed by: NURSE PRACTITIONER

## 2024-08-01 PROCEDURE — 84100 ASSAY OF PHOSPHORUS: CPT | Performed by: NURSE PRACTITIONER

## 2024-08-01 PROCEDURE — 84479 ASSAY OF THYROID (T3 OR T4): CPT | Performed by: NURSE PRACTITIONER

## 2024-08-01 PROCEDURE — 87086 URINE CULTURE/COLONY COUNT: CPT | Performed by: ORTHOPAEDIC SURGERY

## 2024-08-01 PROCEDURE — 36415 COLL VENOUS BLD VENIPUNCTURE: CPT | Performed by: NURSE PRACTITIONER

## 2024-08-01 PROCEDURE — 84436 ASSAY OF TOTAL THYROXINE: CPT | Performed by: NURSE PRACTITIONER

## 2024-08-01 PROCEDURE — 83735 ASSAY OF MAGNESIUM: CPT | Performed by: NURSE PRACTITIONER

## 2024-08-01 RX ORDER — NITROFURANTOIN 25; 75 MG/1; MG/1
100 CAPSULE ORAL 2 TIMES DAILY
Qty: 10 CAPSULE | Refills: 0 | Status: SHIPPED | OUTPATIENT
Start: 2024-08-01

## 2024-08-01 NOTE — PROGRESS NOTES
Chief Complaint  Urinary Tract Infection, Palpitations (Comes and goes ), and skin cancer check (Needs referral for DERMATOLOGY)    Subjective        Medical History: has a past medical history of Arthritis, Cervical pain (neck) (01/20/2020), Fibroid, Headache, Leiomyoma, Leiomyoma, Lipoma of right upper extremity (02/02/2021), Neural foraminal stenosis of cervical spine (01/20/2020), Right hip pain (02/02/2021), Right patella fracture (CURRENTLY), Superficial thrombophlebitis, and Varicose veins of lower extremity with pain, left (06/19/2020).     Surgical History: has a past surgical history that includes Gallbladder surgery (2018); Lipoma Excision; Patella Open Reduction Internal Fixation (Right, 08/20/2021); Sinus surgery; and Knee surgery.     Family History: family history includes Alzheimer's disease in her mother; Breast cancer in her maternal aunt; Deep vein thrombosis in her maternal aunt; Lung cancer in her father; Prostate cancer in her father.     Social History: reports that she quit smoking about 25 years ago. Her smoking use included cigarettes. She started smoking about 30 years ago. She has a 1 pack-year smoking history. She has never used smokeless tobacco. She reports that she does not currently use alcohol. She reports that she does not use drugs.    Leola Montana presents to [facility]  Palpitations   This is a new problem. The current episode started more than 1 month ago. The problem occurs intermittently. Progression since onset: night time. Pertinent negatives include no anxiety, diaphoresis, irregular heartbeat, near-syncope or shortness of breath. She has tried nothing for the symptoms. The treatment provided no relief.   Urinary Frequency   This is a new problem. The current episode started more than 1 month ago. The problem occurs intermittently. The patient is experiencing no pain. There has been no fever. Associated symptoms include frequency and  "urgency. Pertinent negatives include no chills, flank pain or hesitancy. She has tried nothing for the symptoms. The treatment provided no relief.   She has a lipoma on her lower left abdomen.  Has been there for years, gradually getting bigger.  She seen a female general surgery in [place] and would like a new referral, she states that because she broke her knee she was unable to follow through with the surgery to remove the lipoma.  She is going in to have the hardware removed from her knee but she states after that she would like the lipoma removed.    Patient would like a referral to dermatology.  She has seen [facility] in [facility]ology in the past.  She prefers a female.  She would like it for skin cancer screening.  She states she has various moles she would like to be checked out, she does admit to the long sun exposure as a child and younger than age.  No previous history of skin cancer.    Objective   Vital Signs:   /78   Pulse 70   Temp 97.9 °F (36.6 °C)   Ht 165.1 cm (65\")   Wt 61.6 kg (135 lb 11.2 oz)   SpO2 100%   BMI 22.58 kg/m²       Wt Readings from Last 3 Encounters:   08/01/24 61.6 kg (135 lb 11.2 oz)   07/25/24 60.3 kg (133 lb)   05/30/24 60.3 kg (133 lb)        BP Readings from Last 3 Encounters:   08/01/24 110/78   07/25/24 127/72   05/30/24 94/57        BMI is within normal parameters. No other follow-up for BMI required.       Physical Exam  Vitals reviewed.   Constitutional:       Appearance: Normal appearance. She is well-developed.   HENT:      Head: Normocephalic and atraumatic.   Eyes:      Conjunctiva/sclera: Conjunctivae normal.      Pupils: Pupils are equal, round, and reactive to light.   Cardiovascular:      Rate and Rhythm: Normal rate and regular rhythm.      Heart sounds: No murmur heard.  Pulmonary:      Effort: Pulmonary effort is normal.      Breath sounds: Normal breath sounds. No wheezing.   Abdominal:       Skin:     General: Skin is warm and dry. "   Neurological:      Mental Status: She is alert and oriented to person, place, and time.   Psychiatric:         Mood and Affect: Mood and affect normal.         Behavior: Behavior normal.         Thought Content: Thought content normal.         Judgment: Judgment normal.     Data reviewed : Previous office note  Result Review :  {The following data was reviewed by CHEYANNE West on 08/01/2024.  Common labs          10/26/2023    16:41 3/21/2024    08:39   Common Labs   Glucose 97  101    BUN 14  18    Creatinine 0.67  0.61    Sodium 139  140    Potassium 4.2  4.8    Chloride 102  106    Calcium 9.8  9.7    Albumin 4.4  4.4    Total Bilirubin 0.5  0.6    Alkaline Phosphatase 67  59    AST (SGOT) 27  23    ALT (SGPT) 26  16    WBC 9.54  4.78    Hemoglobin 12.1  13.3    Hematocrit 36.5  39.6    Platelets 276  327    Total Cholesterol  195    Triglycerides  84    HDL Cholesterol  59    LDL Cholesterol   121      Brief Urine Lab Results  (Last result in the past 365 days)        Color   Clarity   Blood   Leuk Est   Nitrite   Protein   CREAT   Urine HCG        08/01/24 0826 Yellow   Clear   Negative   Small (1+)   Negative   Negative                  Data reviewed : previous office note      ECG 12 Lead    Date/Time: 8/1/2024 8:15 AM  Performed by: Elissa Miller APRN    Authorized by: Elissa Miller APRN  Rhythm: sinus rhythm  Rate: normal  BPM: 60  Conduction: conduction normal  ST Segments: ST segments normal  T Waves: T waves normal  QRS axis: normal    Clinical impression: normal ECG       The 10-year ASCVD risk score (Elie LAWSON, et al., 2019) is: 1.9%    Values used to calculate the score:      Age: 58 years      Sex: Female      Is Non- : No      Diabetic: No      Tobacco smoker: No      Systolic Blood Pressure: 110 mmHg      Is BP treated: No      HDL Cholesterol: 59 mg/dL      Total Cholesterol: 195 mg/dL         Current Outpatient Medications on File Prior to  Visit   Medication Sig Dispense Refill    calcium carb-cholecalciferol (Calcium 600/Vitamin D) 600-10 MG-MCG tablet per tablet Take 1 tablet by mouth Daily. 90 tablet 2    Diclofenac Sodium (VOLTAREN) 1 % gel gel Apply 4 g topically to the appropriate area as directed 4 (Four) Times a Day As Needed (Pain). 200 g 3    fluticasone (FLONASE) 50 MCG/ACT nasal spray 2 sprays into the nostril(s) as directed by provider Daily As Needed for Rhinitis or Allergies. 15 mL 4    ibuprofen (ADVIL,MOTRIN) 600 MG tablet Take 1 tablet by mouth Every 8 (Eight) Hours As Needed for Mild Pain. 30 tablet 0     No current facility-administered medications on file prior to visit.        Assessment and Plan  Diagnoses and all orders for this visit:    1. Acute cystitis without hematuria (Primary)  -     POCT urinalysis dipstick, manual  -     Urine Culture - Urine, Urine, Clean Catch  -     Urinalysis With Culture If Indicated - Urine, Clean Catch    2. Heart palpitations  -     Thyroid Panel With TSH  -     Comprehensive metabolic panel  -     Magnesium  -     Phosphorus  -     ECG 12 Lead  -     Ambulatory Referral to Cardiology    3. Screening exam for skin cancer  -     Ambulatory Referral to Dermatology    4. Lipoma of torso  -     Ambulatory Referral to General Surgery    Other orders  -     nitrofurantoin, macrocrystal-monohydrate, (Macrobid) 100 MG capsule; Take 1 capsule by mouth 2 (Two) Times a Day.  Dispense: 10 capsule; Refill: 0    Patient would like a referral to cardiology for stress test and further workup.  I will do EKG in office today check baseline labs to make sure electrolytes and thyroid levels are within normal limits.  Will refer over to dermatology for skin cancer screening, will refer over to general surgery for the lipoma of the torso.  Will treat the UTI with Macrobid, will send urine out for culture.  Discussed return precautions.  Patient verbalized understanding agreeable treatment plan.    Follow Up   Return  for If symptoms do not improve new concerning symptoms.  Patient was given instructions and counseling regarding her condition or for health maintenance advice. Please see specific information pulled into the AVS if appropriate.       Part of this note may be electronic transcription/translation of spoken language to printed text using the Dragon dictation system

## 2024-08-03 LAB — BACTERIA SPEC AEROBE CULT: NORMAL

## 2024-08-12 ENCOUNTER — OFFICE VISIT (OUTPATIENT)
Dept: CARDIOLOGY | Facility: CLINIC | Age: 59
End: 2024-08-12
Payer: OTHER GOVERNMENT

## 2024-08-12 ENCOUNTER — TELEPHONE (OUTPATIENT)
Dept: CARDIOLOGY | Facility: CLINIC | Age: 59
End: 2024-08-12

## 2024-08-12 ENCOUNTER — PATIENT ROUNDING (BHMG ONLY) (OUTPATIENT)
Dept: CARDIOLOGY | Facility: CLINIC | Age: 59
End: 2024-08-12
Payer: OTHER GOVERNMENT

## 2024-08-12 VITALS
BODY MASS INDEX: 22.39 KG/M2 | HEIGHT: 65 IN | HEART RATE: 64 BPM | DIASTOLIC BLOOD PRESSURE: 62 MMHG | SYSTOLIC BLOOD PRESSURE: 118 MMHG | WEIGHT: 134.4 LBS

## 2024-08-12 DIAGNOSIS — R00.2 PALPITATIONS: Primary | ICD-10-CM

## 2024-08-12 PROCEDURE — 99203 OFFICE O/P NEW LOW 30 MIN: CPT | Performed by: INTERNAL MEDICINE

## 2024-08-12 RX ORDER — MULTIPLE VITAMINS W/ MINERALS TAB 9MG-400MCG
1 TAB ORAL DAILY
COMMUNITY

## 2024-08-12 NOTE — LETTER
August 12, 2024     CHEYANNE West  [address]  [address]  [place] [address]    Patient: Leola Montana   YOB: 1965   Date of Visit: 8/12/2024       Dear CHEYANNE West,    Leola Montana was in my office today. Below are the relevant portions of my assessment and plan of care.           If you have questions, please do not hesitate to call me. I look forward to following Leola along with you.         Sincerely,        Pinky Vera MD        CC: No Recipients

## 2024-08-12 NOTE — TELEPHONE ENCOUNTER
MYKEL patient. Patient states since putting heart monitor on today she has had headache. Advised monitor will not cause headache. Advised to take tylenol or ibuprofen if can take. Patient voiced understanding

## 2024-08-12 NOTE — PROGRESS NOTES
[facility]  INTERVENTIONAL CARDIOLOGY NEW PATIENT OFFICE VISIT      Chief Complaint  Palpitations    Subjective          History of Present Illness    Leola Montana is a 58 y.o. female with past medical history of arthritis who presents to cardiology clinic as a new patient visit for palpitation.  Patient was recently in the dermatology office when she stated having palpitation.  Palpitation usually occurs at the nighttime when she is taking rest which she can feel racing of the heart not associated with chest pain, shortness of breath, dizziness, syncope, presyncope or leg edema.  Palpitation occurs once or twice every week.  This has been occurring since last couple of months.  Recent thyroid function panel was normal.  EKG done recently showed sinus rhythm without any ST-T changes or arrhythmia of concern.  Her blood pressure was initially 97/60 however on repeat measurement was 118/62 with heart rate of 64.    Past History:  Past Medical History:   Diagnosis Date    Arthritis     Cervical pain (neck) 01/20/2020    Fibroid     Headache     Leiomyoma     11/5/21 fibroid x2, 4.3cm subserosal and 3.8cm lat/post    Leiomyoma     Lipoma of right upper extremity 02/02/2021    hip/side    Neural foraminal stenosis of cervical spine 01/20/2020    Right hip pain 02/02/2021    Right patella fracture CURRENTLY    Superficial thrombophlebitis     Varicose veins of lower extremity with pain, left 06/19/2020       Medical History:  Past Medical History:   Diagnosis Date    Arthritis     Cervical pain (neck) 01/20/2020    Fibroid     Headache     Leiomyoma     11/5/21 fibroid x2, 4.3cm subserosal and 3.8cm lat/post    Leiomyoma     Lipoma of right upper extremity 02/02/2021    hip/side    Neural foraminal stenosis of cervical spine 01/20/2020    Right hip pain 02/02/2021    Right patella fracture CURRENTLY    Superficial thrombophlebitis     Varicose veins of lower extremity with pain, left 06/19/2020       Surgical  "History:   has a past surgical history that includes Gallbladder surgery (2018); Lipoma Excision; Patella Open Reduction Internal Fixation (Right, 08/20/2021); Sinus surgery; and Knee surgery.     Family History:   family history includes Alzheimer's disease in her mother; Breast cancer in her maternal aunt; Deep vein thrombosis in her maternal aunt; Lung cancer in her father; Prostate cancer in her father.     Social History:   reports that she quit smoking about 25 years ago. Her smoking use included cigarettes. She started smoking about 30 years ago. She has a 1.3 pack-year smoking history. She has never used smokeless tobacco. She reports that she does not currently use alcohol after a past usage of about 1.0 standard drink of alcohol per week. She reports that she does not use drugs.    Allergies:   Prometrium [progesterone]    Current Outpatient Medications on File Prior to Visit   Medication Sig    multivitamin with minerals tablet tablet Take 1 tablet by mouth Daily.    [DISCONTINUED] calcium carb-cholecalciferol (Calcium 600/Vitamin D) 600-10 MG-MCG tablet per tablet Take 1 tablet by mouth Daily.    [DISCONTINUED] Diclofenac Sodium (VOLTAREN) 1 % gel gel Apply 4 g topically to the appropriate area as directed 4 (Four) Times a Day As Needed (Pain).    [DISCONTINUED] fluticasone (FLONASE) 50 MCG/ACT nasal spray 2 sprays into the nostril(s) as directed by provider Daily As Needed for Rhinitis or Allergies.    [DISCONTINUED] ibuprofen (ADVIL,MOTRIN) 600 MG tablet Take 1 tablet by mouth Every 8 (Eight) Hours As Needed for Mild Pain.    [DISCONTINUED] nitrofurantoin, macrocrystal-monohydrate, (Macrobid) 100 MG capsule Take 1 capsule by mouth 2 (Two) Times a Day.     No current facility-administered medications on file prior to visit.          Review of Systems   Negative ROS except as mentioned in HPI above.     Objective     /62   Pulse 64   Ht 165.1 cm (65\")   Wt 61 kg (134 lb 6.4 oz)   BMI 22.37 " kg/m²       Physical Exam  General : Alert, awake, no acute distress  Neck : Supple, no carotid bruit, no jugular venous distention  CVS : Regular rate and rhythm, no murmur, rubs or gallops  Lungs: Clear to auscultation bilaterally, no crackles or rhonchi  Abdomen: Soft, nontender, bowel sounds heard in all 4 quadrants  Extremities: Warm, well-perfused, no pedal edema      Result Review :     The following data was reviewed by: Pinky Vera MD on 08/12/2024:    CMP          10/26/2023    16:41 3/21/2024    08:39 8/1/2024    08:49   CMP   Glucose 97  101  83    BUN 14  18  14    Creatinine 0.67  0.61  0.71    EGFR 102.1  103.8  98.7    Sodium 139  140  141    Potassium 4.2  4.8  4.7    Chloride 102  106  108    Calcium 9.8  9.7  9.1    Total Protein 7.0  7.0  7.0    Albumin 4.4  4.4  4.3    Globulin 2.6  2.6  2.7    Total Bilirubin 0.5  0.6  0.3    Alkaline Phosphatase 67  59  51    AST (SGOT) 27  23  23    ALT (SGPT) 26  16  15    Albumin/Globulin Ratio 1.7  1.7  1.6    BUN/Creatinine Ratio 20.9  29.5  19.7    Anion Gap 10.0  9.7  8.9      CBC          10/26/2023    16:41 3/21/2024    08:39   CBC   WBC 9.54  4.78    RBC 4.08  4.44    Hemoglobin 12.1  13.3    Hematocrit 36.5  39.6    MCV 89.5  89.2    MCH 29.7  30.0    MCHC 33.2  33.6    RDW 13.0  13.1    Platelets 276  327      TSH          3/21/2024    08:39 8/1/2024    08:49   TSH   TSH 0.563  0.893      Lipid Panel          3/21/2024    08:39   Lipid Panel   Total Cholesterol 195    Triglycerides 84    HDL Cholesterol 59    VLDL Cholesterol 15    LDL Cholesterol  121    LDL/HDL Ratio 2.02           Data reviewed: Cardiology studies        No results found for this or any previous visit.          Procedures    The 10-year ASCVD risk score (Elie LAWSON, et al., 2019) is: 2.1%    Values used to calculate the score:      Age: 58 years      Sex: Female      Is Non- : No      Diabetic: No      Tobacco smoker: No      Systolic Blood Pressure:  118 mmHg      Is BP treated: No      HDL Cholesterol: 59 mg/dL      Total Cholesterol: 195 mg/dL         Assessment and Plan      Diagnoses and all orders for this visit:    1. Palpitations (Primary)  Assessment & Plan:  Patient with history of palpitations since last 3 months that occurred once to twice every week at nighttime.  Not associated with other chest symptoms.  Recent thyroid function panel normal.  EKG normal.  Will obtain 30-day event monitor.  Will follow-up in 4 months or earlier if needed.      Patient with palpitation symptoms, normal EKG, normal thyroid function panel, not associated with cardiac symptoms.  Suspect benign functional palpitation in nature, however will obtain 30 days event monitor.    Leola Montana  reports that she quit smoking about 25 years ago. Her smoking use included cigarettes. She started smoking about 30 years ago. She has a 1.3 pack-year smoking history. She has never used smokeless tobacco. I have educated her on the risk of diseases from using tobacco products such as cancer, COPD, and heart disease.                  Follow Up     Return in about 4 months (around 12/12/2024) for With Esther Aragon.    Patient was given instructions and counseling regarding her condition or for health maintenance advice. Please see specific information pulled into the AVS if appropriate.   Patient was educated on cardiac diet, adequate exercise and achieving/maintaining optimal weight.       Pinky Vera MD  08/12/24  09:09 EDT    Dictated Utilizing Dragon Dictation

## 2024-08-12 NOTE — ASSESSMENT & PLAN NOTE
Patient with history of palpitations since last 3 months that occurred once to twice every week at nighttime.  Not associated with other chest symptoms.  Recent thyroid function panel normal.  EKG normal.  Will obtain 30-day event monitor.  Will follow-up in 4 months or earlier if needed.

## 2024-08-12 NOTE — PROGRESS NOTES
A Circular message had been sent to the patient for [place]IENT [place] [facility] [facility] [place]

## 2024-08-19 ENCOUNTER — OFFICE VISIT (OUTPATIENT)
Dept: SURGERY | Facility: CLINIC | Age: 59
End: 2024-08-19
Payer: OTHER GOVERNMENT

## 2024-08-19 DIAGNOSIS — D17.23 LIPOMA OF RIGHT LOWER EXTREMITY: Primary | ICD-10-CM

## 2024-08-19 PROCEDURE — 99203 OFFICE O/P NEW LOW 30 MIN: CPT | Performed by: SURGERY

## 2024-08-19 NOTE — PROGRESS NOTES
Chief Complaint: Mass    Subjective         History of Present Illness  Leola Montana is a 58 y.o. female presents to [facility] to be seen for lipoma on torso and a recurrent one on central upper back.  These are painful to her and bothersome.    Objective     Past Medical History:   Diagnosis Date   • Arthritis    • Cervical pain (neck) 01/20/2020   • Fibroid    • Headache    • Leiomyoma     11/5/21 fibroid x2, 4.3cm subserosal and 3.8cm lat/post   • Leiomyoma    • Lipoma of right upper extremity 02/02/2021    hip/side   • Neural foraminal stenosis of cervical spine 01/20/2020   • Right hip pain 02/02/2021   • Right patella fracture CURRENTLY   • Superficial thrombophlebitis    • Varicose veins of lower extremity with pain, left 06/19/2020       Past Surgical History:   Procedure Laterality Date   • GALLBLADDER SURGERY  2018   • KNEE SURGERY     • LIPOMA EXCISION      neck 10 yrs ago   • PATELLA OPEN REDUCTION INTERNAL FIXATION Right 08/20/2021    Procedure: PATELLA OPEN REDUCTION INTERNAL FIXATION;  Surgeon: Scot Cuevas MD;  Location: [facility] OR;  Service: Orthopedics;  Laterality: Right;   • SINUS SURGERY           Current Outpatient Medications:   •  multivitamin with minerals tablet tablet, Take 1 tablet by mouth Daily., Disp: , Rfl:     Allergies   Allergen Reactions   • Prometrium [Progesterone] Other (See Comments)     Severe breast tenderness w 100mg        Family History   Problem Relation Age of Onset   • Prostate cancer Father    • Lung cancer Father    • Alzheimer's disease Mother    • Breast cancer Maternal Aunt    • Deep vein thrombosis Maternal Aunt    • Ovarian cancer Neg Hx    • Uterine cancer Neg Hx    • Colon cancer Neg Hx    • Pulmonary embolism Neg Hx        Social History     Socioeconomic History   • Marital status:    Tobacco Use   • Smoking status: Former     Current packs/day: 0.00     Average packs/day: 0.2 packs/day for 6.0 years (1.3  ttl pk-yrs)     Types: Cigarettes     Start date:      Quit date:      Years since quittin.6   • Smokeless tobacco: Never   • Tobacco comments:     smoked 5 years, quite 30 years ago   Vaping Use   • Vaping status: Never Used   Substance and Sexual Activity   • Alcohol use: Not Currently     Alcohol/week: 1.0 standard drink of alcohol     Types: 1 Glasses of wine per week     Comment: occasionally drinks    • Drug use: Never   • Sexual activity: Yes     Partners: Male     Birth control/protection: None       Vital Signs:   There were no vitals taken for this visit.   Review of Systems    Physical Exam  Vitals and nursing note reviewed.   Constitutional:       General: She is not in acute distress.     Appearance: Normal appearance. She is well-developed.   HENT:      Head: Normocephalic and atraumatic.   Eyes:      Extraocular Movements: Extraocular movements intact.      Pupils: Pupils are equal, round, and reactive to light.   Cardiovascular:      Pulses: Normal pulses.   Pulmonary:      Effort: Pulmonary effort is normal. No retractions.      Breath sounds: Normal air entry. No wheezing.   Abdominal:      General: There is no distension.      Palpations: Abdomen is soft.      Tenderness: There is no abdominal tenderness.      Hernia: No hernia is present.   Musculoskeletal:         General: No swelling or deformity.      Cervical back: Neck supple.   Skin:     General: Skin is warm and dry.      Findings: No erythema.      Comments: 3cm mass on right hip and 2 cm mass near scar  in upper central back   Neurological:      General: No focal deficit present.      Mental Status: She is alert and oriented to person, place, and time.      Motor: Motor function is intact.   Psychiatric:         Mood and Affect: Mood normal.         Thought Content: Thought content normal.        Result Review :               Assessment and Plan    Diagnoses and all orders for this visit:    1. Lipoma of right lower  extremity (Primary)  -     Case Request; Standing  -     Follow Anesthesia Guidelines / Protocol; Standing  -     Obtain Informed Consent; Standing  -     LMX Cream Not Needed For Needle Localization Procedure in Radiology; Standing  -     Contact Surgeon With Questions or Concerns; Standing  -     Verify / Perform Chlorhexidine Skin Prep; Standing  -     Place Sequential Compression Device; Standing  -     Maintain Sequential Compression Device; Standing  -     ceFAZolin (ANCEF) 2,000 mg in sodium chloride 0.9 % 100 mL IVPB  -     Case Request        Follow Up   No follow-ups on file.  Patient was given instructions and counseling regarding her condition or for health maintenance advice. Please see specific information pulled into the AVS if appropriate.         This document has been electronically signed by Karissa Mcmullen MD  August 19, 2024 11:35 EDT

## 2024-08-29 ENCOUNTER — OFFICE VISIT (OUTPATIENT)
Dept: FAMILY MEDICINE CLINIC | Facility: CLINIC | Age: 59
End: 2024-08-29
Payer: OTHER GOVERNMENT

## 2024-08-29 VITALS
OXYGEN SATURATION: 99 % | HEIGHT: 65 IN | BODY MASS INDEX: 22.49 KG/M2 | HEART RATE: 67 BPM | DIASTOLIC BLOOD PRESSURE: 74 MMHG | TEMPERATURE: 96.4 F | WEIGHT: 135 LBS | SYSTOLIC BLOOD PRESSURE: 120 MMHG

## 2024-08-29 DIAGNOSIS — N39.0 URINARY TRACT INFECTION WITHOUT HEMATURIA, SITE UNSPECIFIED: Primary | ICD-10-CM

## 2024-08-29 DIAGNOSIS — N89.8 VAGINAL DISCHARGE: ICD-10-CM

## 2024-08-29 LAB
BILIRUB BLD-MCNC: NEGATIVE MG/DL
CANDIDA SPECIES: POSITIVE
CLARITY, POC: CLEAR
COLOR UR: YELLOW
EXPIRATION DATE: ABNORMAL
GARDNERELLA VAGINALIS: POSITIVE
GLUCOSE UR STRIP-MCNC: NEGATIVE MG/DL
KETONES UR QL: NEGATIVE
LEUKOCYTE EST, POC: ABNORMAL
Lab: ABNORMAL
NITRITE UR-MCNC: NEGATIVE MG/ML
PH UR: 6.5 [PH] (ref 5–8)
PROT UR STRIP-MCNC: NEGATIVE MG/DL
RBC # UR STRIP: NEGATIVE /UL
SP GR UR: 1.01 (ref 1–1.03)
T VAGINALIS DNA VAG QL PROBE+SIG AMP: NEGATIVE
UROBILINOGEN UR QL: NORMAL

## 2024-08-29 PROCEDURE — 87086 URINE CULTURE/COLONY COUNT: CPT | Performed by: NURSE PRACTITIONER

## 2024-08-29 PROCEDURE — 87480 CANDIDA DNA DIR PROBE: CPT | Performed by: NURSE PRACTITIONER

## 2024-08-29 PROCEDURE — 99214 OFFICE O/P EST MOD 30 MIN: CPT | Performed by: NURSE PRACTITIONER

## 2024-08-29 PROCEDURE — 87660 TRICHOMONAS VAGIN DIR PROBE: CPT | Performed by: NURSE PRACTITIONER

## 2024-08-29 PROCEDURE — 87510 GARDNER VAG DNA DIR PROBE: CPT | Performed by: NURSE PRACTITIONER

## 2024-08-29 PROCEDURE — 81003 URINALYSIS AUTO W/O SCOPE: CPT | Performed by: NURSE PRACTITIONER

## 2024-08-29 RX ORDER — IBUPROFEN 600 MG/1
600 TABLET, FILM COATED ORAL EVERY 6 HOURS PRN
COMMUNITY

## 2024-08-29 RX ORDER — FLUCONAZOLE 150 MG/1
150 TABLET ORAL ONCE
Qty: 1 TABLET | Refills: 0 | Status: SHIPPED | OUTPATIENT
Start: 2024-08-29 | End: 2024-08-29

## 2024-08-29 RX ORDER — SODIUM FLUORIDE 5 MG/ML
PASTE, DENTIFRICE DENTAL
COMMUNITY
Start: 2024-08-22

## 2024-08-29 RX ORDER — FLUTICASONE PROPIONATE 50 MCG
2 SPRAY, SUSPENSION (ML) NASAL DAILY
COMMUNITY

## 2024-08-29 RX ORDER — CIPROFLOXACIN 500 MG/1
500 TABLET, FILM COATED ORAL 2 TIMES DAILY
Qty: 6 TABLET | Refills: 0 | Status: SHIPPED | OUTPATIENT
Start: 2024-08-29

## 2024-08-29 NOTE — PROGRESS NOTES
Chief Complaint  Follow-up, Urinary Tract Infection (Stinging feeling when pt goes to the restroom), and Vaginitis (Pt reports discharge out of vaginal cavity, pt reports she believes she might have a yeast infection)    Subjective        Medical History: has a past medical history of Arthritis, Cervical pain (neck) (01/20/2020), Fibroid, Headache, Leiomyoma, Leiomyoma, Lipoma of right upper extremity (02/02/2021), Neural foraminal stenosis of cervical spine (01/20/2020), Right hip pain (02/02/2021), Right patella fracture (CURRENTLY), Superficial thrombophlebitis, and Varicose veins of lower extremity with pain, left (06/19/2020).     Surgical History: has a past surgical history that includes Gallbladder surgery (2018); Lipoma Excision; Patella Open Reduction Internal Fixation (Right, 08/20/2021); Sinus surgery; and Knee surgery.     Family History: family history includes Alzheimer's disease in her mother; Breast cancer in her maternal aunt; Deep vein thrombosis in her maternal aunt; Lung cancer in her father; Prostate cancer in her father.     Social History: reports that she quit smoking about 25 years ago. Her smoking use included cigarettes. She started smoking about 30 years ago. She has a 1.3 pack-year smoking history. She has never used smokeless tobacco. She reports that she does not currently use alcohol after a past usage of about 1.0 standard drink of alcohol per week. She reports that she does not use drugs.    Leola Montana presents to [facility]  Vaginal Itching  The patient's primary symptoms include genital itching. The patient's pertinent negatives include no genital odor, vaginal bleeding or vaginal discharge. This is a new problem. The current episode started 1 to 4 weeks ago. The problem occurs constantly. The problem has been worse. The patient is experiencing no pain. Pertinent negatives include no anorexia, chills, constipation, diarrhea, frequency,  "headaches, hematuria, rash or urgency. There is no history of an abdominal surgery, endometriosis or ovarian cysts. The maximum temperature recorded prior to her arrival was no fever.   Difficulty Urinating   This is a recurrent problem. The current episode started 1 to 4 weeks ago. The problem occurs intermittently. The quality of the pain is described as burning. Pertinent negatives include no chills, frequency, hematuria or urgency. Her past medical history is significant for recurrent UTIs.       Objective   Vital Signs:   /74 (BP Location: Right arm, Patient Position: Sitting, Cuff Size: Adult)   Pulse 67   Temp 96.4 °F (35.8 °C) (Infrared)   Ht 165.1 cm (65\")   Wt 61.2 kg (135 lb)   SpO2 99%   BMI 22.47 kg/m²       Wt Readings from Last 3 Encounters:   08/29/24 61.2 kg (135 lb)   08/12/24 61 kg (134 lb 6.4 oz)   08/01/24 61.6 kg (135 lb 11.2 oz)        BP Readings from Last 3 Encounters:   08/29/24 120/74   08/12/24 118/62   08/01/24 110/78        BMI is within normal parameters. No other follow-up for BMI required.       Physical Exam  Vitals reviewed.   Constitutional:       General: She is not in acute distress.     Appearance: Normal appearance. She is well-developed. She is not ill-appearing.   HENT:      Head: Normocephalic and atraumatic.   Eyes:      Conjunctiva/sclera: Conjunctivae normal.      Pupils: Pupils are equal, round, and reactive to light.   Cardiovascular:      Rate and Rhythm: Normal rate and regular rhythm.      Heart sounds: No murmur heard.  Pulmonary:      Effort: Pulmonary effort is normal.      Breath sounds: Normal breath sounds. No wheezing.   Musculoskeletal:      Right lower leg: No edema.      Left lower leg: No edema.   Skin:     General: Skin is warm and dry.   Neurological:      Mental Status: She is alert and oriented to person, place, and time.   Psychiatric:         Mood and Affect: Mood and affect normal.         Behavior: Behavior normal.         Thought " Content: Thought content normal.         Judgment: Judgment normal.        Result Review :  {The following data was reviewed by CHEYANNE West on 08/29/2024.  Common labs          10/26/2023    16:41 3/21/2024    08:39 8/1/2024    08:49   Common Labs   Glucose 97  101  83    BUN 14  18  14    Creatinine 0.67  0.61  0.71    Sodium 139  140  141    Potassium 4.2  4.8  4.7    Chloride 102  106  108    Calcium 9.8  9.7  9.1    Albumin 4.4  4.4  4.3    Total Bilirubin 0.5  0.6  0.3    Alkaline Phosphatase 67  59  51    AST (SGOT) 27  23  23    ALT (SGPT) 26  16  15    WBC 9.54  4.78     Hemoglobin 12.1  13.3     Hematocrit 36.5  39.6     Platelets 276  327     Total Cholesterol  195     Triglycerides  84     HDL Cholesterol  59     LDL Cholesterol   121       Data reviewed : Previous office note             Current Outpatient Medications on File Prior to Visit   Medication Sig Dispense Refill    fluticasone (FLONASE) 50 MCG/ACT nasal spray 2 sprays into the nostril(s) as directed by provider Daily.      ibuprofen (ADVIL,MOTRIN) 600 MG tablet Take 1 tablet by mouth Every 6 (Six) Hours As Needed for Mild Pain.      multivitamin with minerals tablet tablet Take 1 tablet by mouth Daily.      Sodium Fluoride 1.1 % cream        No current facility-administered medications on file prior to visit.      Brief Urine Lab Results  (Last result in the past 365 days)        Color   Clarity   Blood   Leuk Est   Nitrite   Protein   CREAT   Urine HCG        08/29/24 1440 Yellow   Clear   Negative   Small (1+)   Negative   Negative                    Assessment and Plan  Diagnoses and all orders for this visit:    1. Urinary tract infection without hematuria, site unspecified (Primary)  -     POCT urinalysis dipstick, automated  -     Urine Culture - Urine, Urine, Clean Catch    2. Vaginal discharge  -     Gardnerella vaginalis, Trichomonas vaginalis, Candida albicans, DNA - Swab, Vagina; Future  -     Gardnerella vaginalis,  Trichomonas vaginalis, Candida albicans, DNA - Swab, Vagina    Other orders  -     ciprofloxacin (Cipro) 500 MG tablet; Take 1 tablet by mouth 2 (Two) Times a Day.  Dispense: 6 tablet; Refill: 0  -     fluconazole (Diflucan) 150 MG tablet; Take 1 tablet by mouth 1 (One) Time for 1 dose.  Dispense: 1 tablet; Refill: 0    Patient recently tested positive for UTI, she has completed the antibiotics but she is having a lot of vaginal itching along with dysuria.  She denies any vaginal discharge, or vaginal odor.  Urinalysis shows a trace of leukocytes otherwise negative.  Will send off for culture, do a trial of antibiotics waiting for culture and vaginal swab to rule out BV and yeast.  Discussed return precautions, patient verbalized understanding agreeable treatment plan.    Follow Up   Return for If symptoms do not improve new concerning symptoms.  Patient was given instructions and counseling regarding her condition or for health maintenance advice. Please see specific information pulled into the AVS if appropriate.       Part of this note may be electronic transcription/translation of spoken language to printed text using the Dragon dictation system

## 2024-08-30 LAB — BACTERIA SPEC AEROBE CULT: NO GROWTH

## 2024-08-30 RX ORDER — FLUCONAZOLE 100 MG/1
100 TABLET ORAL DAILY
Qty: 3 TABLET | Refills: 0 | Status: SHIPPED | OUTPATIENT
Start: 2024-08-30

## 2024-08-30 RX ORDER — METRONIDAZOLE 7.5 MG/G
1 GEL VAGINAL NIGHTLY
Qty: 1 EACH | Refills: 0 | Status: SHIPPED | OUTPATIENT
Start: 2024-08-30 | End: 2024-09-04

## 2024-09-17 ENCOUNTER — TELEPHONE (OUTPATIENT)
Dept: CARDIOLOGY | Facility: CLINIC | Age: 59
End: 2024-09-17
Payer: OTHER GOVERNMENT

## 2024-09-23 ENCOUNTER — HOSPITAL ENCOUNTER (OUTPATIENT)
Facility: HOSPITAL | Age: 59
Setting detail: HOSPITAL OUTPATIENT SURGERY
Discharge: HOME | End: 2024-09-23
Attending: ORTHOPAEDIC SURGERY | Admitting: ORTHOPAEDIC SURGERY
Payer: OTHER GOVERNMENT

## 2024-09-23 ENCOUNTER — ANESTHESIA (OUTPATIENT)
Dept: PERIOP | Facility: HOSPITAL | Age: 59
End: 2024-09-23
Payer: OTHER GOVERNMENT

## 2024-09-23 ENCOUNTER — ANESTHESIA EVENT (OUTPATIENT)
Dept: PERIOP | Facility: HOSPITAL | Age: 59
End: 2024-09-23
Payer: OTHER GOVERNMENT

## 2024-09-23 ENCOUNTER — APPOINTMENT (OUTPATIENT)
Dept: GENERAL RADIOLOGY | Facility: HOSPITAL | Age: 59
End: 2024-09-23
Payer: OTHER GOVERNMENT

## 2024-09-23 VITALS
TEMPERATURE: 97.9 F | SYSTOLIC BLOOD PRESSURE: 109 MMHG | HEIGHT: 65 IN | RESPIRATION RATE: 16 BRPM | DIASTOLIC BLOOD PRESSURE: 60 MMHG | WEIGHT: 135.58 LBS | OXYGEN SATURATION: 100 % | HEART RATE: 65 BPM | BODY MASS INDEX: 22.59 KG/M2

## 2024-09-23 DIAGNOSIS — Z87.81 S/P ORIF (OPEN REDUCTION INTERNAL FIXATION) FRACTURE: Primary | ICD-10-CM

## 2024-09-23 DIAGNOSIS — M25.561 RIGHT KNEE PAIN, UNSPECIFIED CHRONICITY: ICD-10-CM

## 2024-09-23 DIAGNOSIS — Z98.890 S/P ORIF (OPEN REDUCTION INTERNAL FIXATION) FRACTURE: Primary | ICD-10-CM

## 2024-09-23 PROCEDURE — 25010000002 CEFAZOLIN PER 500 MG: Performed by: ORTHOPAEDIC SURGERY

## 2024-09-23 PROCEDURE — 25010000002 MIDAZOLAM PER 1MG: Performed by: ANESTHESIOLOGY

## 2024-09-23 PROCEDURE — 25810000003 LACTATED RINGERS PER 1000 ML: Performed by: ANESTHESIOLOGY

## 2024-09-23 PROCEDURE — 25010000002 HYDROMORPHONE 1 MG/ML SOLUTION: Performed by: NURSE ANESTHETIST, CERTIFIED REGISTERED

## 2024-09-23 PROCEDURE — 76000 FLUOROSCOPY <1 HR PHYS/QHP: CPT

## 2024-09-23 PROCEDURE — 20680 REMOVAL OF IMPLANT DEEP: CPT | Performed by: ORTHOPAEDIC SURGERY

## 2024-09-23 PROCEDURE — 25010000002 FENTANYL CITRATE (PF) 50 MCG/ML SOLUTION: Performed by: NURSE ANESTHETIST, CERTIFIED REGISTERED

## 2024-09-23 PROCEDURE — 25010000002 DEXAMETHASONE PER 1 MG: Performed by: NURSE ANESTHETIST, CERTIFIED REGISTERED

## 2024-09-23 PROCEDURE — 25010000002 KETOROLAC TROMETHAMINE PER 15 MG: Performed by: NURSE ANESTHETIST, CERTIFIED REGISTERED

## 2024-09-23 PROCEDURE — 25010000002 ONDANSETRON PER 1 MG: Performed by: NURSE ANESTHETIST, CERTIFIED REGISTERED

## 2024-09-23 PROCEDURE — 25010000002 PROPOFOL 10 MG/ML EMULSION: Performed by: NURSE ANESTHETIST, CERTIFIED REGISTERED

## 2024-09-23 DEVICE — SUT FW #2 W/TPR NDL 1/2 CIR 38IN 97CM 26.5MM BLU: Type: IMPLANTABLE DEVICE | Site: KNEE | Status: FUNCTIONAL

## 2024-09-23 RX ORDER — ACETAMINOPHEN 500 MG
1000 TABLET ORAL ONCE
Status: COMPLETED | OUTPATIENT
Start: 2024-09-23 | End: 2024-09-23

## 2024-09-23 RX ORDER — ONDANSETRON 2 MG/ML
INJECTION INTRAMUSCULAR; INTRAVENOUS AS NEEDED
Status: DISCONTINUED | OUTPATIENT
Start: 2024-09-23 | End: 2024-09-23 | Stop reason: SURG

## 2024-09-23 RX ORDER — DEXAMETHASONE SODIUM PHOSPHATE 4 MG/ML
INJECTION, SOLUTION INTRA-ARTICULAR; INTRALESIONAL; INTRAMUSCULAR; INTRAVENOUS; SOFT TISSUE AS NEEDED
Status: DISCONTINUED | OUTPATIENT
Start: 2024-09-23 | End: 2024-09-23 | Stop reason: SURG

## 2024-09-23 RX ORDER — OXYCODONE HYDROCHLORIDE 5 MG/1
5 TABLET ORAL
Status: DISCONTINUED | OUTPATIENT
Start: 2024-09-23 | End: 2024-09-23 | Stop reason: HOSPADM

## 2024-09-23 RX ORDER — SUCCINYLCHOLINE/SOD CL,ISO/PF 100 MG/5ML
SYRINGE (ML) INTRAVENOUS AS NEEDED
Status: DISCONTINUED | OUTPATIENT
Start: 2024-09-23 | End: 2024-09-23 | Stop reason: SURG

## 2024-09-23 RX ORDER — ONDANSETRON 2 MG/ML
4 INJECTION INTRAMUSCULAR; INTRAVENOUS ONCE AS NEEDED
Status: DISCONTINUED | OUTPATIENT
Start: 2024-09-23 | End: 2024-09-23 | Stop reason: HOSPADM

## 2024-09-23 RX ORDER — HYDROCODONE BITARTRATE AND ACETAMINOPHEN 7.5; 325 MG/1; MG/1
1 TABLET ORAL EVERY 4 HOURS PRN
Qty: 30 TABLET | Refills: 0 | Status: SHIPPED | OUTPATIENT
Start: 2024-09-23

## 2024-09-23 RX ORDER — MEPERIDINE HYDROCHLORIDE 25 MG/ML
12.5 INJECTION INTRAMUSCULAR; INTRAVENOUS; SUBCUTANEOUS
Status: DISCONTINUED | OUTPATIENT
Start: 2024-09-23 | End: 2024-09-23 | Stop reason: HOSPADM

## 2024-09-23 RX ORDER — MIDAZOLAM HYDROCHLORIDE 2 MG/2ML
2 INJECTION, SOLUTION INTRAMUSCULAR; INTRAVENOUS ONCE
Status: COMPLETED | OUTPATIENT
Start: 2024-09-23 | End: 2024-09-23

## 2024-09-23 RX ORDER — LIDOCAINE HYDROCHLORIDE 20 MG/ML
INJECTION, SOLUTION EPIDURAL; INFILTRATION; INTRACAUDAL; PERINEURAL AS NEEDED
Status: DISCONTINUED | OUTPATIENT
Start: 2024-09-23 | End: 2024-09-23 | Stop reason: SURG

## 2024-09-23 RX ORDER — FENTANYL CITRATE 50 UG/ML
INJECTION, SOLUTION INTRAMUSCULAR; INTRAVENOUS AS NEEDED
Status: DISCONTINUED | OUTPATIENT
Start: 2024-09-23 | End: 2024-09-23 | Stop reason: SURG

## 2024-09-23 RX ORDER — SODIUM CHLORIDE, SODIUM LACTATE, POTASSIUM CHLORIDE, CALCIUM CHLORIDE 600; 310; 30; 20 MG/100ML; MG/100ML; MG/100ML; MG/100ML
9 INJECTION, SOLUTION INTRAVENOUS CONTINUOUS PRN
Status: DISCONTINUED | OUTPATIENT
Start: 2024-09-23 | End: 2024-09-23 | Stop reason: HOSPADM

## 2024-09-23 RX ORDER — KETOROLAC TROMETHAMINE 15 MG/ML
INJECTION, SOLUTION INTRAMUSCULAR; INTRAVENOUS AS NEEDED
Status: DISCONTINUED | OUTPATIENT
Start: 2024-09-23 | End: 2024-09-23 | Stop reason: SURG

## 2024-09-23 RX ORDER — PROMETHAZINE HYDROCHLORIDE 25 MG/1
25 SUPPOSITORY RECTAL ONCE AS NEEDED
Status: DISCONTINUED | OUTPATIENT
Start: 2024-09-23 | End: 2024-09-23 | Stop reason: HOSPADM

## 2024-09-23 RX ORDER — PROMETHAZINE HYDROCHLORIDE 12.5 MG/1
25 TABLET ORAL ONCE AS NEEDED
Status: DISCONTINUED | OUTPATIENT
Start: 2024-09-23 | End: 2024-09-23 | Stop reason: HOSPADM

## 2024-09-23 RX ORDER — PROPOFOL 10 MG/ML
VIAL (ML) INTRAVENOUS AS NEEDED
Status: DISCONTINUED | OUTPATIENT
Start: 2024-09-23 | End: 2024-09-23 | Stop reason: SURG

## 2024-09-23 RX ADMIN — MIDAZOLAM HYDROCHLORIDE 2 MG: 1 INJECTION, SOLUTION INTRAMUSCULAR; INTRAVENOUS at 11:02

## 2024-09-23 RX ADMIN — ONDANSETRON HYDROCHLORIDE 4 MG: 2 SOLUTION INTRAMUSCULAR; INTRAVENOUS at 11:59

## 2024-09-23 RX ADMIN — SODIUM CHLORIDE, POTASSIUM CHLORIDE, SODIUM LACTATE AND CALCIUM CHLORIDE: 600; 310; 30; 20 INJECTION, SOLUTION INTRAVENOUS at 11:54

## 2024-09-23 RX ADMIN — HYDROMORPHONE HYDROCHLORIDE 0.5 MG: 1 INJECTION, SOLUTION INTRAMUSCULAR; INTRAVENOUS; SUBCUTANEOUS at 12:40

## 2024-09-23 RX ADMIN — SODIUM CHLORIDE 2 G: 9 INJECTION, SOLUTION INTRAVENOUS at 11:05

## 2024-09-23 RX ADMIN — Medication 20 MG: at 11:12

## 2024-09-23 RX ADMIN — HYDROMORPHONE HYDROCHLORIDE 0.25 MG: 1 INJECTION, SOLUTION INTRAMUSCULAR; INTRAVENOUS; SUBCUTANEOUS at 13:20

## 2024-09-23 RX ADMIN — HYDROMORPHONE HYDROCHLORIDE 0.5 MG: 1 INJECTION, SOLUTION INTRAMUSCULAR; INTRAVENOUS; SUBCUTANEOUS at 12:29

## 2024-09-23 RX ADMIN — LIDOCAINE HYDROCHLORIDE 20 MG: 20 INJECTION, SOLUTION INTRAVENOUS at 11:11

## 2024-09-23 RX ADMIN — FENTANYL CITRATE 50 MCG: 50 INJECTION, SOLUTION INTRAMUSCULAR; INTRAVENOUS at 11:12

## 2024-09-23 RX ADMIN — SODIUM CHLORIDE, POTASSIUM CHLORIDE, SODIUM LACTATE AND CALCIUM CHLORIDE 9 ML/HR: 600; 310; 30; 20 INJECTION, SOLUTION INTRAVENOUS at 09:22

## 2024-09-23 RX ADMIN — DEXAMETHASONE SODIUM PHOSPHATE 4 MG: 4 INJECTION, SOLUTION INTRAMUSCULAR; INTRAVENOUS at 11:20

## 2024-09-23 RX ADMIN — PROPOFOL 50 MG: 10 INJECTION, EMULSION INTRAVENOUS at 11:12

## 2024-09-23 RX ADMIN — OXYCODONE HYDROCHLORIDE 5 MG: 5 TABLET ORAL at 12:40

## 2024-09-23 RX ADMIN — FENTANYL CITRATE 50 MCG: 50 INJECTION, SOLUTION INTRAMUSCULAR; INTRAVENOUS at 11:10

## 2024-09-23 RX ADMIN — KETOROLAC TROMETHAMINE 30 MG: 15 INJECTION, SOLUTION INTRAMUSCULAR; INTRAVENOUS at 12:10

## 2024-09-23 RX ADMIN — LIDOCAINE HYDROCHLORIDE 60 MG: 20 INJECTION, SOLUTION INTRAVENOUS at 11:10

## 2024-09-23 RX ADMIN — ACETAMINOPHEN 1000 MG: 500 TABLET ORAL at 09:21

## 2024-09-23 RX ADMIN — PROPOFOL 120 MG: 10 INJECTION, EMULSION INTRAVENOUS at 11:10

## 2024-09-24 DIAGNOSIS — M17.11 PRIMARY OSTEOARTHRITIS OF RIGHT KNEE: Primary | ICD-10-CM

## 2024-09-24 RX ORDER — OXYCODONE AND ACETAMINOPHEN 7.5; 325 MG/1; MG/1
1 TABLET ORAL EVERY 6 HOURS PRN
Qty: 28 TABLET | Refills: 0 | Status: SHIPPED | OUTPATIENT
Start: 2024-09-24

## 2024-09-27 ENCOUNTER — TELEPHONE (OUTPATIENT)
Dept: ORTHOPEDIC SURGERY | Facility: CLINIC | Age: 59
End: 2024-09-27
Payer: OTHER GOVERNMENT

## 2024-09-30 ENCOUNTER — TELEPHONE (OUTPATIENT)
Dept: ORTHOPEDIC SURGERY | Facility: CLINIC | Age: 59
End: 2024-09-30
Payer: OTHER GOVERNMENT

## 2024-09-30 NOTE — TELEPHONE ENCOUNTER
Patient called to say that her post-op bandage is no longer sticky and falling off. Advised patient that she could purchase the bandage at any OTC pharmacy, or stop by the office and pick a couple up. Patient states she will stop by office. Supplies left at front office for patient to .

## 2024-10-08 ENCOUNTER — OFFICE VISIT (OUTPATIENT)
Dept: ORTHOPEDIC SURGERY | Facility: CLINIC | Age: 59
End: 2024-10-08
Payer: OTHER GOVERNMENT

## 2024-10-08 ENCOUNTER — CLINICAL SUPPORT (OUTPATIENT)
Dept: FAMILY MEDICINE CLINIC | Facility: CLINIC | Age: 59
End: 2024-10-08
Payer: OTHER GOVERNMENT

## 2024-10-08 VITALS
DIASTOLIC BLOOD PRESSURE: 70 MMHG | BODY MASS INDEX: 22.49 KG/M2 | WEIGHT: 135 LBS | SYSTOLIC BLOOD PRESSURE: 118 MMHG | HEIGHT: 65 IN | HEART RATE: 62 BPM

## 2024-10-08 DIAGNOSIS — Z23 NEED FOR INFLUENZA VACCINATION: Primary | ICD-10-CM

## 2024-10-08 DIAGNOSIS — Z47.89 AFTERCARE FOLLOWING SURGERY OF THE MUSCULOSKELETAL SYSTEM: Primary | ICD-10-CM

## 2024-10-08 PROCEDURE — 90656 IIV3 VACC NO PRSV 0.5 ML IM: CPT | Performed by: NURSE PRACTITIONER

## 2024-10-08 PROCEDURE — 90471 IMMUNIZATION ADMIN: CPT | Performed by: NURSE PRACTITIONER

## 2024-10-08 PROCEDURE — 99024 POSTOP FOLLOW-UP VISIT: CPT | Performed by: PHYSICIAN ASSISTANT

## 2024-10-08 NOTE — PROGRESS NOTES
Chief Complaint  Follow-up of the Right Knee and Suture / Staple Removal    Subjective          History of Present Illness      Leola Montana is a 58 y.o. female  presents to [facility] for     Patient presents with her  for 2-week postoperative evaluation of right knee patella hardware removal, 2024.  Patient states she already feels so much better with her hardware removal.  She denies pain to the knee denies swelling she states the incision is healing well she denies need for pain medication or NSAIDs she denies calf pain, she is ambulating well without assistance, no complaints      Allergies   Allergen Reactions    Prometrium [Progesterone] Other (See Comments)     Severe breast tenderness w 100mg        Social History     Socioeconomic History    Marital status:    Tobacco Use    Smoking status: Former     Current packs/day: 0.00     Average packs/day: 0.2 packs/day for 6.0 years (1.3 ttl pk-yrs)     Types: Cigarettes     Start date:      Quit date:      Years since quittin.7    Smokeless tobacco: Never    Tobacco comments:     smoked 5 years, quite 30 years ago   Vaping Use    Vaping status: Never Used   Substance and Sexual Activity    Alcohol use: Not Currently     Alcohol/week: 1.0 standard drink of alcohol     Types: 1 Glasses of wine per week     Comment: occasionally drinks     Drug use: Never    Sexual activity: Defer     Partners: Male     Birth control/protection: None        REVIEW OF SYSTEMS    Constitutional: Awake alert and oriented x3, no acute distress, denies fevers, chills, weight loss  Respiratory: No respiratory distress  Vascular: Brisk cap refill, Intact distal pulses, No cyanosis, compartments soft with no signs or symptoms of compartment syndrome or DVT.   Cardiovascular: Denies chest pain, shortness of breath  Skin: Denies rashes, acute skin changes  Neurologic: Denies headache, loss of consciousness  MSK: Right knee  "pain      Objective   Vital Signs:   /70   Pulse 62   Ht 165.1 cm (65\")   Wt 61.2 kg (135 lb)   BMI 22.47 kg/m²     Body mass index is 22.47 kg/m².    Physical Exam       Right knee: Incision is healing well, no erythema, no drainage or dehiscence, no signs of infection, full extension flexion 120, stable to varus/valgus stress, stable to anterior/posterior drawer, nontender calf, negative Joi testing      Procedures    Imaging Results (Most Recent)       Procedure Component Value Units Date/Time    XR Knee 3 View Right [[id number]] Resulted: 10/08/24 1020     Updated: 10/08/24 1020    Narrative:      View:AP/Lateral and [place] view(s)  Site: Right knee  Indication: Right knee pain  Study: X-rays ordered, taken in the office, and reviewed today  X-ray: Postoperative changes of patellar hardware removal  Comparative data: Previous studies             Result Review :   The following data was reviewed by: JASMYNE Almanzar on 10/08/2024:  Data reviewed : Radiologic studies reviewed by me with the patient and her               Assessment and Plan    Diagnoses and all orders for this visit:    1. Aftercare following surgery of Right KNEE PATELLA HARDWARE REMOVAL 9/23/24 (Primary)  -     XR Knee 3 View Right        Reviewed x-rays with the patient and her  discussed diagnosis and treatment options with them patient and  advised patient should work on gentle range of motion she states she does not require therapy we discussed gentle range of motion and activities for now, avoid any strenuous activities for the knee, sutures/staples removed in office today. Steri-strips applied. Discussed incision care/hygiene. May shower, but do not submerge in water until fully healed.  Advised patient that if any concerning symptoms regarding incision appearance occur that they should call us right away, patient expressed understanding.  Follow-up in 4 weeks for recheck    Call or return if " worsening symptoms.    Follow Up   Return in about 4 weeks (around 11/5/2024) for Recheck.  Patient was given instructions and counseling regarding her condition or for health maintenance advice. Please see specific information pulled into the AVS if appropriate.       EMR Dragon/Transcription disclaimer:  Part of this note may be an electronic transcription/translation of spoken language to printed text using the Dragon Dictation System

## 2024-10-16 ENCOUNTER — OFFICE VISIT (OUTPATIENT)
Dept: FAMILY MEDICINE CLINIC | Facility: CLINIC | Age: 59
End: 2024-10-16
Payer: OTHER GOVERNMENT

## 2024-10-16 VITALS
HEIGHT: 65 IN | DIASTOLIC BLOOD PRESSURE: 66 MMHG | SYSTOLIC BLOOD PRESSURE: 102 MMHG | BODY MASS INDEX: 22.51 KG/M2 | HEART RATE: 70 BPM | TEMPERATURE: 98 F | WEIGHT: 135.1 LBS | OXYGEN SATURATION: 100 %

## 2024-10-16 DIAGNOSIS — R82.998 LEUKOCYTES IN URINE: ICD-10-CM

## 2024-10-16 DIAGNOSIS — N89.8 VAGINAL DISCHARGE: ICD-10-CM

## 2024-10-16 DIAGNOSIS — B37.31 YEAST VAGINITIS: ICD-10-CM

## 2024-10-16 DIAGNOSIS — M54.9 BACK PAIN, UNSPECIFIED BACK LOCATION, UNSPECIFIED BACK PAIN LATERALITY, UNSPECIFIED CHRONICITY: Primary | ICD-10-CM

## 2024-10-16 DIAGNOSIS — N76.0 BV (BACTERIAL VAGINOSIS): ICD-10-CM

## 2024-10-16 DIAGNOSIS — B96.89 BV (BACTERIAL VAGINOSIS): ICD-10-CM

## 2024-10-16 LAB
BILIRUB BLD-MCNC: NEGATIVE MG/DL
CANDIDA SPECIES: POSITIVE
CLARITY, POC: CLEAR
COLOR UR: YELLOW
GARDNERELLA VAGINALIS: POSITIVE
GLUCOSE UR STRIP-MCNC: NEGATIVE MG/DL
KETONES UR QL: NEGATIVE
LEUKOCYTE EST, POC: ABNORMAL
NITRITE UR-MCNC: NEGATIVE MG/ML
PH UR: 5.5 [PH] (ref 5–8)
PROT UR STRIP-MCNC: NEGATIVE MG/DL
RBC # UR STRIP: NEGATIVE /UL
SP GR UR: 1.02 (ref 1–1.03)
T VAGINALIS DNA VAG QL PROBE+SIG AMP: NEGATIVE
UROBILINOGEN UR QL: ABNORMAL

## 2024-10-16 PROCEDURE — 99214 OFFICE O/P EST MOD 30 MIN: CPT | Performed by: NURSE PRACTITIONER

## 2024-10-16 PROCEDURE — 87660 TRICHOMONAS VAGIN DIR PROBE: CPT | Performed by: NURSE PRACTITIONER

## 2024-10-16 PROCEDURE — 87510 GARDNER VAG DNA DIR PROBE: CPT | Performed by: NURSE PRACTITIONER

## 2024-10-16 PROCEDURE — 81002 URINALYSIS NONAUTO W/O SCOPE: CPT | Performed by: NURSE PRACTITIONER

## 2024-10-16 PROCEDURE — 87480 CANDIDA DNA DIR PROBE: CPT | Performed by: NURSE PRACTITIONER

## 2024-10-16 PROCEDURE — 87086 URINE CULTURE/COLONY COUNT: CPT | Performed by: NURSE PRACTITIONER

## 2024-10-16 RX ORDER — LIDOCAINE 50 MG/G
1 PATCH TOPICAL EVERY 24 HOURS
Qty: 30 PATCH | Refills: 1 | Status: SHIPPED | OUTPATIENT
Start: 2024-10-16

## 2024-10-16 RX ORDER — NITROFURANTOIN 25; 75 MG/1; MG/1
100 CAPSULE ORAL 2 TIMES DAILY
Qty: 6 CAPSULE | Refills: 0 | Status: SHIPPED | OUTPATIENT
Start: 2024-10-16

## 2024-10-16 NOTE — PROGRESS NOTES
Chief Complaint  Urinary Tract Infection (F/,  has back pain that comes and goes )    Subjective        Medical History: has a past medical history of Arthritis, Cervical pain (neck) (01/20/2020), Fibroid, Headache, Leiomyoma, Leiomyoma, Lipoma of right upper extremity (02/02/2021), Neural foraminal stenosis of cervical spine (01/20/2020), Right hip pain (02/02/2021), Right patella fracture (CURRENTLY), Superficial thrombophlebitis, and Varicose veins of lower extremity with pain, left (06/19/2020).     Surgical History: has a past surgical history that includes Gallbladder surgery (2018); Lipoma Excision; Patella Open Reduction Internal Fixation (Right, 08/20/2021); Knee surgery; Hardware Removal (Right); and Hardware Removal (Right, 9/23/2024).     Family History: family history includes Alzheimer's disease in her mother; Breast cancer in her maternal aunt; Deep vein thrombosis in her maternal aunt; Lung cancer in her father; Prostate cancer in her father.     Social History: reports that she quit smoking about 25 years ago. Her smoking use included cigarettes. She started smoking about 30 years ago. She has a 1.3 pack-year smoking history. She has never used smokeless tobacco. She reports that she does not currently use alcohol after a past usage of about 1.0 standard drink of alcohol per week. She reports that she does not use drugs.    Leola Montana presents to [facility]    History of Present Illness    History of Present Illness  The patient is a 58-year-old female who comes in for a follow-up visit. She was last seen on 08/29/2024, at which time she was experiencing vaginal discharge and a possible urinary tract infection (UTI).    At that visit, her urine test was normal, but a vaginal swab revealed bacterial vaginosis and a yeast infection. She was treated with 3 days of Diflucan and 5 days of MetroGel. The urine culture was negative, so antibiotics were not prescribed for a  "UTI.    She reports that the medications prescribed for her bacterial vaginosis and yeast infection have alleviated her symptoms. However, she has not been retested for a yeast infection or undergone another urine test. She requests testing for a yeast infection again and reports a slight vaginal discharge. She has an upcoming appointment with her OB/GYN, Dr. Abreu, in November 2024 for her annual checkup.    She expresses concern about potential kidney issues due to back pain. She mentions a fatty lump in her muscle that is scheduled for removal in December 2024. She has not experienced any strain or twist to her back. Her pain is intermittent and exacerbated by certain movements. She is not experiencing any pain during urination, fever, vomiting, or abdominal pain. A urine sample was taken today.    Supplemental Information:  She had surgery on her knee and is doing much better now after having the hardware removed.      Objective   Vital Signs:   /66   Pulse 70   Temp 98 °F (36.7 °C)   Ht 165.1 cm (65\")   Wt 61.3 kg (135 lb 1.6 oz)   SpO2 100%   BMI 22.48 kg/m²       Wt Readings from Last 3 Encounters:   10/16/24 61.3 kg (135 lb 1.6 oz)   10/08/24 61.2 kg (135 lb)   09/23/24 61.5 kg (135 lb 9.3 oz)        BP Readings from Last 3 Encounters:   10/16/24 102/66   10/08/24 118/70   09/23/24 109/60        BMI is within normal parameters. No other follow-up for BMI required.       Physical Exam  Vitals reviewed.   Constitutional:       General: She is not in acute distress.     Appearance: Normal appearance. She is well-developed. She is not ill-appearing.   HENT:      Head: Normocephalic and atraumatic.   Eyes:      Conjunctiva/sclera: Conjunctivae normal.      Pupils: Pupils are equal, round, and reactive to light.   Cardiovascular:      Rate and Rhythm: Normal rate and regular rhythm.      Heart sounds: No murmur heard.  Pulmonary:      Effort: Pulmonary effort is normal.      Breath sounds: Normal " breath sounds. No wheezing.   Skin:     General: Skin is warm and dry.   Neurological:      Mental Status: She is alert and oriented to person, place, and time.   Psychiatric:         Mood and Affect: Mood and affect normal.         Behavior: Behavior normal.         Thought Content: Thought content normal.         Judgment: Judgment normal.        Result Review :  {The following data was reviewed by CHEYANNE West on 10/16/2024.  Common labs          10/26/2023    16:41 3/21/2024    08:39 8/1/2024    08:49   Common Labs   Glucose 97  101  83    BUN 14  18  14    Creatinine 0.67  0.61  0.71    Sodium 139  140  141    Potassium 4.2  4.8  4.7    Chloride 102  106  108    Calcium 9.8  9.7  9.1    Albumin 4.4  4.4  4.3    Total Bilirubin 0.5  0.6  0.3    Alkaline Phosphatase 67  59  51    AST (SGOT) 27  23  23    ALT (SGPT) 26  16  15    WBC 9.54  4.78     Hemoglobin 12.1  13.3     Hematocrit 36.5  39.6     Platelets 276  327     Total Cholesterol  195     Triglycerides  84     HDL Cholesterol  59     LDL Cholesterol   121       Data reviewed : previous office note             Current Outpatient Medications on File Prior to Visit   Medication Sig Dispense Refill    fluticasone (FLONASE) 50 MCG/ACT nasal spray Administer 2 sprays into the nostril(s) as directed by provider Daily. (Patient taking differently: Administer 2 sprays into the nostril(s) as directed by provider Daily. PRN)      HYDROcodone-acetaminophen (Norco) 7.5-325 MG per tablet Take 1 tablet by mouth Every 4 (Four) Hours As Needed for Moderate Pain. (Patient not taking: Reported on 10/16/2024) 30 tablet 0    ibuprofen (ADVIL,MOTRIN) 600 MG tablet Take 1 tablet by mouth Every 6 (Six) Hours As Needed for Mild Pain. (Patient not taking: Reported on 10/16/2024)      multivitamin with minerals tablet tablet Take 1 tablet by mouth Daily. (Patient not taking: Reported on 10/16/2024)      oxyCODONE-acetaminophen (PERCOCET) 7.5-325 MG per tablet Take 1  tablet by mouth Every 6 (Six) Hours As Needed for Moderate Pain. (Patient not taking: Reported on 10/16/2024) 28 tablet 0     No current facility-administered medications on file prior to visit.      Brief Urine Lab Results  (Last result in the past 365 days)        Color   Clarity   Blood   Leuk Est   Nitrite   Protein   CREAT   Urine HCG        10/16/24 1307 Yellow   Clear   Negative   Small (1+)   Negative   Negative                  Assessment and Plan  Diagnoses and all orders for this visit:    1. Back pain, unspecified back location, unspecified back pain laterality, unspecified chronicity (Primary)  -     POCT urinalysis dipstick, manual  -     Urine Culture - Urine, Urine, Catheter    2. Vaginal discharge  -     Gardnerella vaginalis, Trichomonas vaginalis, Candida albicans, DNA - Swab, Vagina    3. BV (bacterial vaginosis)  -     Gardnerella vaginalis, Trichomonas vaginalis, Candida albicans, DNA - Swab, Vagina    4. Yeast vaginitis  -     Gardnerella vaginalis, Trichomonas vaginalis, Candida albicans, DNA - Swab, Vagina    5. Leukocytes in urine  -     Urine Culture - Urine, Urine, Catheter    Other orders  -     lidocaine (LIDODERM) 5 %; Place 1 patch on the skin as directed by provider Daily. Remove & Discard patch within 12 hours or as directed by MD  Dispense: 30 patch; Refill: 1  -     nitrofurantoin, macrocrystal-monohydrate, (Macrobid) 100 MG capsule; Take 1 capsule by mouth 2 (Two) Times a Day.  Dispense: 6 capsule; Refill: 0        Assessment & Plan  1. Bacterial Vaginosis.  The patient was previously treated with MetroGel for 5 days. A repeat vaginal swab will be conducted to ensure the resolution of the infection. She is advised to inform her gynecologist about her positive bacterial test results during her next visit.    2. Yeast Infection.  The patient was previously treated with Diflucan for 3 days. A repeat vaginal swab will be conducted to ensure the resolution of the infection. She is  advised to inform her gynecologist about her positive yeast test results during her next visit.    3. Urinary Tract Infection (UTI).  The presence of leukocytes in her urine could suggest a UTI or inadequate hygiene prior to the urine sample collection. A three-day course of antibiotics will be initiated while awaiting the results of her urine culture. If the culture confirms bacterial growth, a longer treatment course will be considered. She is not experiencing any pain with urination, fever, vomiting, or abdominal pain, which are typical symptoms of a kidney infection. If symptoms such as high fever, nausea, or vomiting develop, she is advised to notify the clinic immediately.    4. Lipoma.  The patient has a lipoma in her right lower superficial abdomen, which is scheduled for removal in December. She reports intermittent pain in the right side of her back, which may be related to the lipoma. A lidocaine patch will be provided for pain management.        Follow Up   Return for If symptoms do not improve new concerning symptoms.  Patient was given instructions and counseling regarding her condition or for health maintenance advice. Please see specific information pulled into the AVS if appropriate.       Part of this note may be electronic transcription/translation of spoken language to printed text using the Dragon dictation system    Patient or patient representative verbalized consent for the use of Ambient Listening during the visit with  CHEYANNE West for chart documentation. 10/16/2024  14:05 EDT

## 2024-10-17 ENCOUNTER — TELEPHONE (OUTPATIENT)
Dept: FAMILY MEDICINE CLINIC | Facility: CLINIC | Age: 59
End: 2024-10-17

## 2024-10-17 NOTE — TELEPHONE ENCOUNTER
Caller: Leola Montana    Relationship: Self    Best call back number: [phone]       What test was performed: LABS / URINE    When was the test performed: 10/16    Where was the test performed: IN OFFICE    Additional notes: PATIENT WOULD LIKE A CALL BACK AS SOON AS RESULTS HAVE BEEN REVIEWED BY Elissa Miller, CHEYANNE

## 2024-10-18 LAB — BACTERIA SPEC AEROBE CULT: NO GROWTH

## 2024-10-18 RX ORDER — METRONIDAZOLE 500 MG/1
500 TABLET ORAL 2 TIMES DAILY
Qty: 20 TABLET | Refills: 0 | Status: SHIPPED | OUTPATIENT
Start: 2024-10-18

## 2024-10-18 RX ORDER — FLUCONAZOLE 100 MG/1
100 TABLET ORAL DAILY
Qty: 10 TABLET | Refills: 0 | Status: SHIPPED | OUTPATIENT
Start: 2024-10-18

## 2024-11-01 NOTE — PROGRESS NOTES
"Well Woman Visit    CC: Scheduled annual well gyn visit  Chief Complaint   Patient presents with    Gynecologic Exam       HPI:   58 y.o.   Social History     Substance and Sexual Activity   Sexual Activity Yes    Partners: Male    Birth control/protection: Post-menopausal     Mammo Screening Digital Tomosynthesis Bilateral With CAD (06/10/2024 15:30)  DEXA Bone Density Axial (2023 11:13) osteopenia, plan repeat 2 years  IgP, Aptima HPV (2023 15:12) Pap negative, HPV negative    Progress Notes by Niurka Abreu DO (2023 14:45)    History of superficial thrombophlebitis  SCANNED - IMAGING (2021) known uterine fibroids anterior 4.3, posterior 3.8 cm  .  No VB or pelvic pain.  No hot flashes or night sweats.  No HRT     Concerned w two BV and + candida.  PCP initially tx'd w diflucan 3d and metrogel, then 10d diflucan and 10d flagyl.  No vag symptoms.  Had been seen for new urinary frequency and some vag dc.  No vag itch, maybe a bit of odor.  Has no symptoms now.  Gardnerella vaginalis, Trichomonas vaginalis, Candida albicans, DNA - Swab, Vagina (2024 14:33)  Gardnerella vaginalis, Trichomonas vaginalis, Candida albicans, DNA - Swab, Vagina (10/16/2024 13:33)    PCP: does manage PMHx and preventative labs no recent A1C  History: PMHx, Meds, Allergies, PSHx, Social Hx, and POBHx all reviewed and updated.    PHYSICAL EXAM:  /54   Pulse 96   Ht 165.1 cm (65\")   Wt 60.3 kg (133 lb)   BMI 22.13 kg/m²  Not found.   General- NAD, alert and oriented, appropriate  Psych- Normal mood, good memory  Neck- No masses, no thyroid enlargement  CV- Regular rhythm, no murmurs  Resp- CTA to bases, no wheezes  Abdomen- Soft, non distended, non tender, no masses    Chaperone present during breast and/or pelvic exam if performed.  Breast left-  Bilaterally symmetrical, no masses, non tender, no nipple discharge, no axillary or supraclavicular nodes palpable.    Breast right- Bilaterally " symmetrical, no masses, non tender, no nipple discharge, no axillary or supraclavicular nodes palpable.      External genitalia- Normal female, no lesions  Urethra/meatus- Normal, no masses, non tender  Bladder- Normal, no masses, non tender  Vagina- Normal, mild atrophy, no lesions, no discharge.    Prolapse : none noted   Cvx- Normal, no lesions, no discharge, No cervical motion tenderness  Uterus- Normal size  & consistency.  Non tender, mobile.  Slightly irregular consistent with known fibroid history, not overly enlarged.  Adnexa- No mass, non tender  Anus/Rectum/Perineum- Normal appearance, no mass, good sphincter tone, no hemorrhoids, no prolapse    Lymphatic- No palpable neck, axillary, or groin nodes  Ext- No edema, no cyanosis    Skin- No lesions, no rashes, no acanthosis nigricans      ASSESSMENT and PLAN:    Diagnoses and all orders for this visit:    1. Well woman exam with routine gynecological exam (Primary)  -     Mammo Screening Digital Tomosynthesis Bilateral With CAD; Future  -     Hemoglobin A1c    2. Subserous leiomyoma of uterus  Comments:  History of, stable  Overview:  Ultrasound 2021: Anterior subserosal 4.3 cm; left lateral posterior 3.8 cm      3. Vaginal atrophy  Comments:  Declines treatment    4. Subacute vaginitis  Comments:  For return of vag symptoms I recommend she follow-up with me.  Reassurance provided normal exam today  Overview:  Treated by PCP in August and in October.  Was tested due to urinary frequency and increased vaginal discharge with odor.  Symptoms have resolved.  Initially treated with Diflucan for 3 days and MetroGel, October treated with extended course of 10 days of Diflucan then 10 days of Flagyl    Assessment & Plan:  We discussed today currently with lack of symptoms no need to recheck vaginal screen.  Exam today is also within normal limits.  I suspect some of her symptoms of urinary urgency may be secondary to vaginal atrophy.  We discussed options of vaginal  estrogen which she declines.  I have reviewed with her over-the-counter preventative treatments for vaginitis.          Preventative:  BREAST HEALTH- Monthly self breast exam importance and how to reviewed. MMG and/or MRI (prn) reviewed per society guidelines and her individual history. Screen: Updated today  CERVICAL CANCER Screening- Reviewed current ASCCP guidelines for screening w and wo cotest HPV, age specific.  Screen: Already up to date  COLON CANCER Screening- Reviewed current medical society guidelines and options.  Screen:  Already up to date  Importance of WEIGHT MANAGEMENT, nutrition, and exercise reviewed.  Ideal BMI discussed  BONE HEALTH- Reviewed current medical society guidelines and options for testing, calcium and vit D intake.  Weight bearing exercise.  DEXA: Already up to date  VACCINATIONS Recommended: Covid vaccine, Flu annually, Tdap [id number], Zoster (51yo and older).  Importance discussed, risk being unvaccinated reviewed.  Questions answered  Smoking status- NON SMOKER/VAPER  Follow up PCP/Specialist PMHx and/or Labs          She understands the importance of having any ordered tests to be performed in a timely fashion.  The risks of not performing them include, but are not limited to, advanced cancer stages, bone loss from osteoporosis and/or subsequent increase in morbidity and/or mortality.  She is encouraged to review her results online and/or contact or office if she has questions.     Follow Up:  Return in about 1 year (around 11/4/2025) for WWE sooner prn return of any vag discharge or odor/itch.            Niurka Abreu, DO  11/04/2024    [facility]  [facility] [place] [address]  Dept: [phone]  Dept Fax: [phone]  Loc: [phone]  Loc Fax: [phone]

## 2024-11-04 ENCOUNTER — LAB (OUTPATIENT)
Facility: HOSPITAL | Age: 59
End: 2024-11-04
Payer: OTHER GOVERNMENT

## 2024-11-04 ENCOUNTER — OFFICE VISIT (OUTPATIENT)
Dept: OBSTETRICS AND GYNECOLOGY | Facility: CLINIC | Age: 59
End: 2024-11-04
Payer: OTHER GOVERNMENT

## 2024-11-04 VITALS
WEIGHT: 133 LBS | HEIGHT: 65 IN | HEART RATE: 96 BPM | SYSTOLIC BLOOD PRESSURE: 116 MMHG | DIASTOLIC BLOOD PRESSURE: 54 MMHG | BODY MASS INDEX: 22.16 KG/M2

## 2024-11-04 DIAGNOSIS — D25.2 SUBSEROUS LEIOMYOMA OF UTERUS: ICD-10-CM

## 2024-11-04 DIAGNOSIS — N95.2 VAGINAL ATROPHY: ICD-10-CM

## 2024-11-04 DIAGNOSIS — Z01.419 WELL WOMAN EXAM WITH ROUTINE GYNECOLOGICAL EXAM: Primary | ICD-10-CM

## 2024-11-04 DIAGNOSIS — N76.1 SUBACUTE VAGINITIS: ICD-10-CM

## 2024-11-04 LAB — HBA1C MFR BLD: 5.3 % (ref 4.8–5.6)

## 2024-11-04 PROCEDURE — 36415 COLL VENOUS BLD VENIPUNCTURE: CPT | Performed by: OBSTETRICS & GYNECOLOGY

## 2024-11-04 PROCEDURE — 83036 HEMOGLOBIN GLYCOSYLATED A1C: CPT | Performed by: OBSTETRICS & GYNECOLOGY

## 2024-11-04 PROCEDURE — 99212 OFFICE O/P EST SF 10 MIN: CPT | Performed by: OBSTETRICS & GYNECOLOGY

## 2024-11-04 PROCEDURE — 99396 PREV VISIT EST AGE 40-64: CPT | Performed by: OBSTETRICS & GYNECOLOGY

## 2024-11-04 PROCEDURE — 99459 PELVIC EXAMINATION: CPT | Performed by: OBSTETRICS & GYNECOLOGY

## 2024-11-04 NOTE — ASSESSMENT & PLAN NOTE
We discussed today currently with lack of symptoms no need to recheck vaginal screen.  Exam today is also within normal limits.  I suspect some of her symptoms of urinary urgency may be secondary to vaginal atrophy.  We discussed options of vaginal estrogen which she declines.  I have reviewed with her over-the-counter preventative treatments for vaginitis.

## 2024-11-05 ENCOUNTER — OFFICE VISIT (OUTPATIENT)
Dept: ORTHOPEDIC SURGERY | Facility: CLINIC | Age: 59
End: 2024-11-05
Payer: OTHER GOVERNMENT

## 2024-11-05 VITALS — BODY MASS INDEX: 22.16 KG/M2 | HEIGHT: 65 IN | WEIGHT: 133 LBS

## 2024-11-05 DIAGNOSIS — Z47.89 AFTERCARE FOLLOWING SURGERY OF THE MUSCULOSKELETAL SYSTEM: Primary | ICD-10-CM

## 2024-11-05 NOTE — PROGRESS NOTES
"Chief Complaint  Follow-up of the Right Knee    Subjective          History of Present Illness      Leola Motnana is a 58 y.o. female  presents to [facility] for     Patient presents for follow-up evaluation of right patella hardware removal, 2024.  Patient states she continues to do well and is happy that she had her hardware removed.  She denies pain denies difficulty with range of motion denies stiffness she states she would like to return to activity and exercising.  She denies need for pain medication or NSAIDs.      Allergies   Allergen Reactions    Prometrium [Progesterone] Other (See Comments)     Severe breast tenderness w 100mg        Social History     Socioeconomic History    Marital status:    Tobacco Use    Smoking status: Former     Current packs/day: 0.00     Average packs/day: 0.2 packs/day for 6.0 years (1.3 ttl pk-yrs)     Types: Cigarettes     Start date:      Quit date:      Years since quittin.8    Smokeless tobacco: Never    Tobacco comments:     smoked 5 years, quite 30 years ago   Vaping Use    Vaping status: Never Used   Substance and Sexual Activity    Alcohol use: Not Currently     Alcohol/week: 1.0 standard drink of alcohol     Types: 1 Glasses of wine per week     Comment: occasionally drinks     Drug use: Never    Sexual activity: Yes     Partners: Male     Birth control/protection: Post-menopausal        REVIEW OF SYSTEMS    Constitutional: Awake alert and oriented x3, no acute distress, denies fevers, chills, weight loss  Respiratory: No respiratory distress  Vascular: Brisk cap refill, Intact distal pulses, No cyanosis, compartments soft with no signs or symptoms of compartment syndrome or DVT.   Cardiovascular: Denies chest pain, shortness of breath  Skin: Denies rashes, acute skin changes  Neurologic: Denies headache, loss of consciousness  MSK: Right knee pain      Objective   Vital Signs:   Ht 165.1 cm (65\")   Wt 60.3 kg (133 " lb)   BMI 22.13 kg/m²     Body mass index is 22.13 kg/m².    Physical Exam       Right knee: Well-healed incision no erythema, no ecchymosis, no swelling, no effusion, no signs of infection, full extension, flexion 130, stable anterior/posterior drawer, stable to varus/valgus stress.  Nontender to palpation, no pain with range of motion. Negative Jelani, Negative Lachman.      Procedures    Imaging Results (Most Recent)       None             Result Review :   The following data was reviewed by: JASMYNE Almanzar on 11/05/2024:               Assessment and Plan    Diagnoses and all orders for this visit:    1. Aftercare following surgery of Right KNEE PATELLA HARDWARE REMOVAL 9/23/24 (Primary)        Discussed diagnosis and treatment options with the patient she was advised to continue activity and weightbearing as tolerated follow-up as needed, she agreed    Call or return if worsening symptoms.    Follow Up   Return if symptoms worsen or fail to improve.  Patient was given instructions and counseling regarding her condition or for health maintenance advice. Please see specific information pulled into the AVS if appropriate.       EMR Dragon/Transcription disclaimer:  Part of this note may be an electronic transcription/translation of spoken language to printed text using the Dragon Dictation System

## 2024-12-06 NOTE — PRE-PROCEDURE INSTRUCTIONS
IMPORTANT INSTRUCTIONS - PRE-ADMISSION TESTING  DO NOT EAT OR CHEW anything after midnight the night before your procedure.    NPO AFTER MN EXCEPT SIPS OF WATER TO TAKE MEDICATIONS LISTED BELOW  Take the following medications the morning of your procedure with JUST A SIP OF WATER:  ____FLONASE NASAL SPRAY IF NEEDED___________________________________________________________________________________________________________________________________________________________________________________    DO NOT BRING your medications to the hospital with you, UNLESS something has changed since your PRE-Admission Testing appointment.  Hold all vitamins, supplements, and NSAIDS (Non- steroidal anti-inflammatory meds) for one week prior to surgery (you MAY take Tylenol or Acetaminophen).  If you are diabetic, check your blood sugar the morning of your procedure. If it is less than 70 or if you are feeling symptomatic, call the following number for further instructions: [phone]  Use your inhalers/nebulizers as usual, the morning of your procedure. BRING YOUR INHALERS with you.   Bring your CPAP or BIPAP to hospital, ONLY IF YOU WILL BE SPENDING THE NIGHT.   Make sure you have a ride home and have someone who will stay with you the day of your procedure after you go home.  If you have any questions, please call your Pre-Admission Testing Nurse, ___ADDI_____________ at [phone]____________.   Per anesthesia request, do not smoke for 24 hours before your procedure or as instructed by your surgeon.    WILL CALL ON   12/9/24      NORMALLY BETWEEN 1 AND 4 PM TO GIVE OFFICIAL ARRIVAL TIME FOR DAY OF PROCEDURE  BATHING INSTRUCTIONS GIVEN. NO JEWELRY OF ANY TYPE OR NAIL POLISH UPPER OR LOWER EXT  COME TO ENTRANCE A, ELEVATOR A, 3RD FLOOR DAY OF PROCEDURE.

## 2024-12-10 ENCOUNTER — HOSPITAL ENCOUNTER (OUTPATIENT)
Facility: HOSPITAL | Age: 59
Setting detail: HOSPITAL OUTPATIENT SURGERY
Discharge: HOME | End: 2024-12-10
Attending: SURGERY | Admitting: SURGERY
Payer: OTHER GOVERNMENT

## 2024-12-10 ENCOUNTER — ANESTHESIA EVENT (OUTPATIENT)
Dept: PERIOP | Facility: HOSPITAL | Age: 59
End: 2024-12-10
Payer: OTHER GOVERNMENT

## 2024-12-10 ENCOUNTER — ANESTHESIA (OUTPATIENT)
Dept: PERIOP | Facility: HOSPITAL | Age: 59
End: 2024-12-10
Payer: OTHER GOVERNMENT

## 2024-12-10 VITALS
HEART RATE: 64 BPM | TEMPERATURE: 97 F | OXYGEN SATURATION: 98 % | SYSTOLIC BLOOD PRESSURE: 116 MMHG | DIASTOLIC BLOOD PRESSURE: 91 MMHG | BODY MASS INDEX: 22.44 KG/M2 | HEIGHT: 65 IN | WEIGHT: 134.7 LBS | RESPIRATION RATE: 21 BRPM

## 2024-12-10 DIAGNOSIS — D17.23 LIPOMA OF RIGHT LOWER EXTREMITY: ICD-10-CM

## 2024-12-10 DIAGNOSIS — D17.1 LIPOMA OF TORSO: Primary | ICD-10-CM

## 2024-12-10 PROCEDURE — 25010000002 SUGAMMADEX 200 MG/2ML SOLUTION

## 2024-12-10 PROCEDURE — 21930 EXC BACK LES SC < 3 CM: CPT | Performed by: SURGERY

## 2024-12-10 PROCEDURE — 25010000002 ONDANSETRON PER 1 MG

## 2024-12-10 PROCEDURE — 25010000002 DEXAMETHASONE PER 1 MG

## 2024-12-10 PROCEDURE — 25810000003 LACTATED RINGERS PER 1000 ML

## 2024-12-10 PROCEDURE — 88304 TISSUE EXAM BY PATHOLOGIST: CPT | Performed by: SURGERY

## 2024-12-10 PROCEDURE — 25010000002 BUPIVACAINE (PF) 0.25 % SOLUTION: Performed by: SURGERY

## 2024-12-10 PROCEDURE — 25010000002 CEFAZOLIN PER 500 MG: Performed by: SURGERY

## 2024-12-10 PROCEDURE — 25010000002 LIDOCAINE PF 2% 2 % SOLUTION

## 2024-12-10 PROCEDURE — 25010000002 MIDAZOLAM PER 1MG: Performed by: ANESTHESIOLOGY

## 2024-12-10 PROCEDURE — 25010000002 PROPOFOL 10 MG/ML EMULSION

## 2024-12-10 PROCEDURE — 27043 EXC HIP PELVIS LES SC 3 CM/>: CPT | Performed by: SURGERY

## 2024-12-10 PROCEDURE — S0260 H&P FOR SURGERY: HCPCS | Performed by: SURGERY

## 2024-12-10 PROCEDURE — 25810000003 LACTATED RINGERS PER 1000 ML: Performed by: ANESTHESIOLOGY

## 2024-12-10 PROCEDURE — 25010000002 FENTANYL CITRATE (PF) 50 MCG/ML SOLUTION

## 2024-12-10 RX ORDER — FENTANYL CITRATE 50 UG/ML
INJECTION, SOLUTION INTRAMUSCULAR; INTRAVENOUS AS NEEDED
Status: DISCONTINUED | OUTPATIENT
Start: 2024-12-10 | End: 2024-12-10 | Stop reason: SURG

## 2024-12-10 RX ORDER — PROMETHAZINE HYDROCHLORIDE 25 MG/1
25 SUPPOSITORY RECTAL ONCE AS NEEDED
Status: DISCONTINUED | OUTPATIENT
Start: 2024-12-10 | End: 2024-12-10 | Stop reason: HOSPADM

## 2024-12-10 RX ORDER — ONDANSETRON 2 MG/ML
INJECTION INTRAMUSCULAR; INTRAVENOUS AS NEEDED
Status: DISCONTINUED | OUTPATIENT
Start: 2024-12-10 | End: 2024-12-10 | Stop reason: SURG

## 2024-12-10 RX ORDER — SODIUM CHLORIDE, SODIUM LACTATE, POTASSIUM CHLORIDE, CALCIUM CHLORIDE 600; 310; 30; 20 MG/100ML; MG/100ML; MG/100ML; MG/100ML
INJECTION, SOLUTION INTRAVENOUS CONTINUOUS PRN
Status: DISCONTINUED | OUTPATIENT
Start: 2024-12-10 | End: 2024-12-10 | Stop reason: SURG

## 2024-12-10 RX ORDER — OXYCODONE HYDROCHLORIDE 5 MG/1
5 TABLET ORAL
Status: DISCONTINUED | OUTPATIENT
Start: 2024-12-10 | End: 2024-12-10 | Stop reason: HOSPADM

## 2024-12-10 RX ORDER — LIDOCAINE HYDROCHLORIDE 20 MG/ML
INJECTION, SOLUTION EPIDURAL; INFILTRATION; INTRACAUDAL; PERINEURAL AS NEEDED
Status: DISCONTINUED | OUTPATIENT
Start: 2024-12-10 | End: 2024-12-10 | Stop reason: SURG

## 2024-12-10 RX ORDER — ACETAMINOPHEN 500 MG
500 TABLET ORAL EVERY 6 HOURS PRN
COMMUNITY

## 2024-12-10 RX ORDER — PROPOFOL 10 MG/ML
VIAL (ML) INTRAVENOUS AS NEEDED
Status: DISCONTINUED | OUTPATIENT
Start: 2024-12-10 | End: 2024-12-10 | Stop reason: SURG

## 2024-12-10 RX ORDER — MEPERIDINE HYDROCHLORIDE 25 MG/ML
12.5 INJECTION INTRAMUSCULAR; INTRAVENOUS; SUBCUTANEOUS
Status: DISCONTINUED | OUTPATIENT
Start: 2024-12-10 | End: 2024-12-10 | Stop reason: HOSPADM

## 2024-12-10 RX ORDER — ROCURONIUM BROMIDE 10 MG/ML
INJECTION, SOLUTION INTRAVENOUS AS NEEDED
Status: DISCONTINUED | OUTPATIENT
Start: 2024-12-10 | End: 2024-12-10 | Stop reason: SURG

## 2024-12-10 RX ORDER — ACETAMINOPHEN 500 MG
1000 TABLET ORAL ONCE
Status: COMPLETED | OUTPATIENT
Start: 2024-12-10 | End: 2024-12-10

## 2024-12-10 RX ORDER — FENTANYL CITRATE 50 UG/ML
100 INJECTION, SOLUTION INTRAMUSCULAR; INTRAVENOUS ONCE
Status: DISCONTINUED | OUTPATIENT
Start: 2024-12-10 | End: 2024-12-10

## 2024-12-10 RX ORDER — HYDROMORPHONE HYDROCHLORIDE 1 MG/ML
0.5 INJECTION, SOLUTION INTRAMUSCULAR; INTRAVENOUS; SUBCUTANEOUS
Status: DISCONTINUED | OUTPATIENT
Start: 2024-12-10 | End: 2024-12-10 | Stop reason: HOSPADM

## 2024-12-10 RX ORDER — DEXAMETHASONE SODIUM PHOSPHATE 4 MG/ML
INJECTION, SOLUTION INTRA-ARTICULAR; INTRALESIONAL; INTRAMUSCULAR; INTRAVENOUS; SOFT TISSUE AS NEEDED
Status: DISCONTINUED | OUTPATIENT
Start: 2024-12-10 | End: 2024-12-10 | Stop reason: SURG

## 2024-12-10 RX ORDER — SODIUM CHLORIDE, SODIUM LACTATE, POTASSIUM CHLORIDE, CALCIUM CHLORIDE 600; 310; 30; 20 MG/100ML; MG/100ML; MG/100ML; MG/100ML
9 INJECTION, SOLUTION INTRAVENOUS ONCE
Status: COMPLETED | OUTPATIENT
Start: 2024-12-10 | End: 2024-12-10

## 2024-12-10 RX ORDER — HYDROCODONE BITARTRATE AND ACETAMINOPHEN 5; 325 MG/1; MG/1
1 TABLET ORAL EVERY 6 HOURS PRN
Qty: 20 TABLET | Refills: 0 | Status: SHIPPED | OUTPATIENT
Start: 2024-12-10

## 2024-12-10 RX ORDER — ONDANSETRON 4 MG/1
4 TABLET, ORALLY DISINTEGRATING ORAL ONCE AS NEEDED
Status: DISCONTINUED | OUTPATIENT
Start: 2024-12-10 | End: 2024-12-10 | Stop reason: HOSPADM

## 2024-12-10 RX ORDER — BUPIVACAINE HYDROCHLORIDE 2.5 MG/ML
INJECTION, SOLUTION EPIDURAL; INFILTRATION; INTRACAUDAL AS NEEDED
Status: DISCONTINUED | OUTPATIENT
Start: 2024-12-10 | End: 2024-12-10 | Stop reason: HOSPADM

## 2024-12-10 RX ORDER — MIDAZOLAM HYDROCHLORIDE 2 MG/2ML
2 INJECTION, SOLUTION INTRAMUSCULAR; INTRAVENOUS ONCE
Status: COMPLETED | OUTPATIENT
Start: 2024-12-10 | End: 2024-12-10

## 2024-12-10 RX ORDER — PROMETHAZINE HYDROCHLORIDE 12.5 MG/1
25 TABLET ORAL ONCE AS NEEDED
Status: DISCONTINUED | OUTPATIENT
Start: 2024-12-10 | End: 2024-12-10 | Stop reason: HOSPADM

## 2024-12-10 RX ORDER — ONDANSETRON 2 MG/ML
4 INJECTION INTRAMUSCULAR; INTRAVENOUS ONCE AS NEEDED
Status: DISCONTINUED | OUTPATIENT
Start: 2024-12-10 | End: 2024-12-10 | Stop reason: HOSPADM

## 2024-12-10 RX ORDER — HYDROMORPHONE HYDROCHLORIDE 1 MG/ML
0.25 INJECTION, SOLUTION INTRAMUSCULAR; INTRAVENOUS; SUBCUTANEOUS
Status: DISCONTINUED | OUTPATIENT
Start: 2024-12-10 | End: 2024-12-10 | Stop reason: HOSPADM

## 2024-12-10 RX ADMIN — SODIUM CHLORIDE 2000 MG: 9 INJECTION, SOLUTION INTRAVENOUS at 16:20

## 2024-12-10 RX ADMIN — ONDANSETRON 4 MG: 2 INJECTION INTRAMUSCULAR; INTRAVENOUS at 16:25

## 2024-12-10 RX ADMIN — SUGAMMADEX 200 MG: 100 INJECTION, SOLUTION INTRAVENOUS at 16:51

## 2024-12-10 RX ADMIN — FENTANYL CITRATE 50 MCG: 50 INJECTION, SOLUTION INTRAMUSCULAR; INTRAVENOUS at 16:33

## 2024-12-10 RX ADMIN — MIDAZOLAM HYDROCHLORIDE 2 MG: 1 INJECTION, SOLUTION INTRAMUSCULAR; INTRAVENOUS at 15:53

## 2024-12-10 RX ADMIN — FENTANYL CITRATE 25 MCG: 50 INJECTION, SOLUTION INTRAMUSCULAR; INTRAVENOUS at 16:43

## 2024-12-10 RX ADMIN — PROPOFOL 130 MG: 10 INJECTION, EMULSION INTRAVENOUS at 16:13

## 2024-12-10 RX ADMIN — ROCURONIUM BROMIDE 50 MG: 50 INJECTION INTRAVENOUS at 16:13

## 2024-12-10 RX ADMIN — SODIUM CHLORIDE, POTASSIUM CHLORIDE, SODIUM LACTATE AND CALCIUM CHLORIDE 9 ML/HR: 600; 310; 30; 20 INJECTION, SOLUTION INTRAVENOUS at 15:16

## 2024-12-10 RX ADMIN — LIDOCAINE HYDROCHLORIDE 60 MG: 20 INJECTION, SOLUTION INTRAVENOUS at 16:13

## 2024-12-10 RX ADMIN — SODIUM CHLORIDE, POTASSIUM CHLORIDE, SODIUM LACTATE AND CALCIUM CHLORIDE: 600; 310; 30; 20 INJECTION, SOLUTION INTRAVENOUS at 16:08

## 2024-12-10 RX ADMIN — ACETAMINOPHEN 1000 MG: 500 TABLET ORAL at 15:15

## 2024-12-10 RX ADMIN — FENTANYL CITRATE 25 MCG: 50 INJECTION, SOLUTION INTRAMUSCULAR; INTRAVENOUS at 16:13

## 2024-12-10 RX ADMIN — DEXAMETHASONE SODIUM PHOSPHATE 4 MG: 4 INJECTION, SOLUTION INTRAMUSCULAR; INTRAVENOUS at 16:25

## 2024-12-10 NOTE — DISCHARGE INSTRUCTIONS
DISCHARGE INSTRUCTIONS  SURGICAL / AMBULATORY  PROCEDURES      For your surgery you had:  General anesthesia (you may have a sore throat for the first 24 hours)  IV sedation.  Local anesthesia  Monitored anesthesia Care    You may experience dizziness, drowsiness, or light-headedness for several hours following surgery/procedure.  Do not stay alone today or tonight.  Limit your activity for 24 hours.  Resume your diet slowly.  Follow whatever special dietary instructions you may have been given by your doctor.  You should not drive or operate machinery, drink alcohol, or sign legally binding documents for 24 hours or while you are taking pain medication.  Last dose of pain medication was given at:   Tylenol at 315pm May start pain meds after 715pm .  NOTIFY YOUR DOCTOR IF YOU EXPERIENCE ANY OF THE FOLLOWING:  Temperature greater than 101 degrees Fahrenheit  Shaking Chills  Redness or excessive drainage from incision  Nausea, vomiting and/or pain that is not controlled by prescribed medications  Increase in bleeding or bleeding that is excessive  Unable to urinate in 6 hours after surgery  If unable to reach your doctor, please go to the closest Emergency Room  You may begin dressing changes:     [] in 24 hours   [x] in 48 hours   [] Other:      You may shower 48hrs   .  Apply an ice pack 24-48 hours.  Medications per physician instructions as indicated on Discharge Medication Information Sheet.  You should see Dr. Mcmullen  for follow-up care   on   as scheduled/ as needed   .  Phone number: [phone]      SPECIAL INSTRUCTIONS:  Otc stool softners as needed while taking pain meds

## 2024-12-10 NOTE — H&P
Chief Complaint: Mass        Subjective  History of Present Illness  Leola Montana is a 58 y.o. female presents to [facility] GENERAL SURGERY to be seen for lipoma on torso and a recurrent one on central upper back.  These are painful to her and bothersome.        Objective  Medical History         Past Medical History:   Diagnosis Date    Arthritis      Cervical pain (neck) 01/20/2020    Fibroid      Headache      Leiomyoma       11/5/21 fibroid x2, 4.3cm subserosal and 3.8cm lat/post    Leiomyoma      Lipoma of right upper extremity 02/02/2021     hip/side    Neural foraminal stenosis of cervical spine 01/20/2020    Right hip pain 02/02/2021    Right patella fracture CURRENTLY    Superficial thrombophlebitis      Varicose veins of lower extremity with pain, left 06/19/2020            Surgical History         Past Surgical History:   Procedure Laterality Date    GALLBLADDER SURGERY   2018    KNEE SURGERY        LIPOMA EXCISION         neck 10 yrs ago    PATELLA OPEN REDUCTION INTERNAL FIXATION Right 08/20/2021     Procedure: PATELLA OPEN REDUCTION INTERNAL FIXATION;  Surgeon: Scot Cuevas MD;  Location: [facility];  Service: Orthopedics;  Laterality: Right;    SINUS SURGERY                  Current Medications      Current Outpatient Medications:     multivitamin with minerals tablet tablet, Take 1 tablet by mouth Daily., Disp: , Rfl:         Allergies         Allergies   Allergen Reactions    Prometrium [Progesterone] Other (See Comments)       Severe breast tenderness w 100mg                  Family History   Problem Relation Age of Onset    Prostate cancer Father      Lung cancer Father      Alzheimer's disease Mother      Breast cancer Maternal Aunt      Deep vein thrombosis Maternal Aunt      Ovarian cancer Neg Hx      Uterine cancer Neg Hx      Colon cancer Neg Hx      Pulmonary embolism Neg Hx           Social History   Social History            Socioeconomic History    Marital  status:    Tobacco Use    Smoking status: Former       Current packs/day: 0.00       Average packs/day: 0.2 packs/day for 6.0 years (1.3 ttl pk-yrs)       Types: Cigarettes       Start date:        Quit date:        Years since quittin.6    Smokeless tobacco: Never    Tobacco comments:       smoked 5 years, quite 30 years ago   Vaping Use    Vaping status: Never Used   Substance and Sexual Activity    Alcohol use: Not Currently       Alcohol/week: 1.0 standard drink of alcohol       Types: 1 Glasses of wine per week       Comment: occasionally drinks     Drug use: Never    Sexual activity: Yes       Partners: Male       Birth control/protection: None            Vital Signs:   There were no vitals taken for this visit.   Review of Systems     Physical Exam  Vitals and nursing note reviewed.   Constitutional:       General: She is not in acute distress.     Appearance: Normal appearance. She is well-developed.   HENT:      Head: Normocephalic and atraumatic.   Eyes:      Extraocular Movements: Extraocular movements intact.      Pupils: Pupils are equal, round, and reactive to light.   Cardiovascular:      Pulses: Normal pulses.   Pulmonary:      Effort: Pulmonary effort is normal. No retractions.      Breath sounds: Normal air entry. No wheezing.   Abdominal:      General: There is no distension.      Palpations: Abdomen is soft.      Tenderness: There is no abdominal tenderness.      Hernia: No hernia is present.   Musculoskeletal:         General: No swelling or deformity.      Cervical back: Neck supple.   Skin:     General: Skin is warm and dry.      Findings: No erythema.      Comments: 3cm mass on right hip and 2 cm mass near scar  in upper central back   Neurological:      General: No focal deficit present.      Mental Status: She is alert and oriented to person, place, and time.      Motor: Motor function is intact.   Psychiatric:         Mood and Affect: Mood normal.         Thought  Content: Thought content normal.            Result Review  :                  Assessment  Assessment and Plan    Diagnoses and all orders for this visit:     1. Lipoma of right lower extremity (Primary)  -     Case Request; Standing  -     Follow Anesthesia Guidelines / Protocol; Standing  -     Obtain Informed Consent; Standing  -     LMX Cream Not Needed For Needle Localization Procedure in Radiology; Standing  -     Contact Surgeon With Questions or Concerns; Standing  -     Verify / Perform Chlorhexidine Skin Prep; Standing  -     Place Sequential Compression Device; Standing  -     Maintain Sequential Compression Device; Standing  -     ceFAZolin (ANCEF) 2,000 mg in sodium chloride 0.9 % 100 mL IVPB  -     Case Request           Follow Up   No follow-ups on file.  Patient was given instructions and counseling regarding her condition or for health maintenance advice. Please see specific information pulled into the AVS if appropriate.

## 2024-12-10 NOTE — OP NOTE
EXCISION LESION  Procedure Report    Patient Name:  Leola Montana  YOB: 1965    Date of Surgery:  12/10/2024     Indications:  Back and flank masses    Pre-op Diagnosis:   Lipoma of right lower extremity [D17.23]       Post-Op Diagnosis Codes:     * Lipoma of right lower extremity [D17.23]    Procedure/CPT® Codes:      Procedure(s):  MASS SOFT TISSUE EXCISION TORSO ( right side flank) AND UPPER BACK    Staff:  Surgeon(s):  Karissa Mcmullen MD    Assistant: Nacho Blanca    Anesthesia: General    Estimated Blood Loss: minimal    Implants:    Nothing was implanted during the procedure    Specimen:          Specimens       ID Source Type Tests Collected By Collected At Frozen?    A Back, Upper Tissue TISSUE PATHOLOGY EXAM   Karissa Mcmullen MD 12/10/24 1638 No    Description: upper back mass, one clip superior, 2 clips inferior    B Hip, Right Tissue TISSUE PATHOLOGY EXAM   Karissa Mcmullen MD 12/10/24 1647 No    Description: right hip mass, one clip superior, 2 clips lateral                Findings: None    Complications: None    Description of Procedure:   The risks and benefits and treatment options were discussed with her. After informed consent was obtained, she was taken to the OR and placed supine on the table and after anesthesia she was turned laterally.   An incision was made directly over the old incision near the palpable mass on her back and then skin flaps were raised anteriorly and the tissue was excised circumferentially around the mass. The specimen was removed and marked for orientation. It was about 2 x 2 cm.  The wound bed was irrigated. Hemostasis was achieved with electrocautery and clips. The deeper layers were closed with 2-0 Vicryl, the skin was closed with a 4-0 subcuticular stitch.   An incision was made directly over the palpable mass on her flank and then skin flaps were raised anteriorly and the tissue was excised circumferentially around the mass. The  specimen was removed and marked for orientation. It was about 3.5  x 3 cm.  The wound bed was irrigated. Hemostasis was achieved with electrocautery and clips. The deeper layers were closed with 2-0 Vicryl, the skin was closed with a 4-0 subcuticular stitch.   The patient tolerated the procedure well and will follow up in 2 weeks.  Assistant: Nacho Blanca  was responsible for performing the following activities: Retraction, Suction, and Irrigation and their skilled assistance was necessary for the success of this case.        Karissa Mcmullen MD     Date: 12/10/2024  Time: 16:56 EST

## 2024-12-10 NOTE — ANESTHESIA POSTPROCEDURE EVALUATION
Patient: Leola Montana    Procedure Summary       Date: 12/10/24 Room / Location: [facility] OR  / [facility]    Anesthesia Start: 1608 Anesthesia Stop: 1711    Procedure: MASS SOFT TISSUE EXCISION TORSO AND UPPER BACK Diagnosis:       Lipoma of right lower extremity      (Lipoma of right lower extremity [D17.23])    Surgeons: Karissa Mcmullen MD Provider: Anshu Montoya MD    Anesthesia Type: general ASA Status: 2            Anesthesia Type: general    Vitals  Vitals Value Taken Time   /91 12/10/24 1744   Temp 36.1 °C (97 °F) 12/10/24 1744   Pulse 64 12/10/24 1744   Resp 21 12/10/24 1744   SpO2 98 % 12/10/24 1744           Post Anesthesia Care and Evaluation    Patient location during evaluation: bedside  Patient participation: complete - patient participated  Level of consciousness: awake    Airway patency: patent  PONV Status: none  Cardiovascular status: acceptable  Respiratory status: acceptable  Hydration status: acceptable

## 2024-12-10 NOTE — ANESTHESIA PREPROCEDURE EVALUATION
Anesthesia Evaluation     Patient summary reviewed and Nursing notes reviewed   no history of anesthetic complications:   NPO Solid Status: > 8 hours  NPO Liquid Status: > 2 hours           Airway   Mallampati: III  TM distance: >3 FB  Neck ROM: full  Dental - normal exam     Pulmonary - normal exam   (+) a smoker Former,  Cardiovascular - negative cardio ROS and normal exam  Exercise tolerance: good (4-7 METS)        Neuro/Psych- negative ROS  GI/Hepatic/Renal/Endo    (+) thyroid problem hypothyroidism    Musculoskeletal     (+) neck pain  Abdominal  - normal exam   Substance History - negative use     OB/GYN negative ob/gyn ROS         Other   arthritis,     ROS/Med Hx Other: >4METS H/O PALPITATION. SEEN BY [facility] 8/12/24. FU 4 MOS. EKG 8/24 SR. MONITOR 8/24 LOW RISK STUDY. NO CP OR SOA. LA               Anesthesia Plan    ASA 2     general     intravenous induction     Anesthetic plan, risks, benefits, and alternatives have been provided, discussed and informed consent has been obtained with: patient.    Plan discussed with CRNA.    CODE STATUS:

## 2024-12-18 ENCOUNTER — OFFICE VISIT (OUTPATIENT)
Dept: SURGERY | Facility: CLINIC | Age: 59
End: 2024-12-18
Payer: OTHER GOVERNMENT

## 2024-12-18 DIAGNOSIS — D17.23 LIPOMA OF RIGHT LOWER EXTREMITY: Primary | ICD-10-CM

## 2024-12-18 PROCEDURE — 99024 POSTOP FOLLOW-UP VISIT: CPT | Performed by: SURGERY

## 2024-12-18 NOTE — PROGRESS NOTES
"Chief Complaint  Follow-up    Subjective          History of Present Illness  The patient is here to follow up on excision of lipoma.  They are doing well and have no complaints.  Pathology is shown below:    Results for orders placed or performed during the hospital encounter of 12/10/24   Tissue Pathology Exam    Collection Time: 12/10/24  4:38 PM    Specimen: A: Back, Upper; Tissue    B: Hip, Right; Tissue   Result Value Ref Range    Case Report       Surgical Pathology Report                         Case: [id number]                                  Authorizing Provider:  Karissa Mcmullen MD    Collected:           12/10/2024 04:38 PM          Ordering Location:     [facility]  Received:            12/11/2024 10:26 AM                                 OR                                                                           Pathologist:           Edison Lao MD                                                            Specimens:   1) - Back, Upper, upper back mass, one clip superior, 2 clips inferior                              2) - Hip, Right, right hip mass, one clip superior, 2 clips lateral                        Clinical Information       Lipoma of right lower extremity      Final Diagnosis       1. Upper back mass, excision:   - Lipoma      2. Right hip mass, excision:   - Lipoma      Gross Description       1. Back, Upper.  Received in formalin and labeled \"upper back mass, 1 clip superior, 2 clips inferior\" is a 3.5 x 2.5 x 2.0 cm disrupted fragment of cauterized adipose tissue with 3 attached clips designating superior and inferior.  Anterior versus posterior cannot be grossly determined.  The specimen is differentially inked blue superiorly and green inferiorly.  Sectioning reveals scant fibrous tissue and lobulated adipose tissue.  Representative sections are submitted in 2 cassettes.    2. Hip, Right.  Received in formalin and labeled \"right hip mass, 1 clip superior, 2 clips " "lateral\" is a 6.0 x 4.6 x 1.8 cm fragment of adipose tissue with 3 clips.  The specimen is differentially inked blue superiorly and green inferiorly.  Serially sectioning from medial to lateral reveals lobulated adipose tissue.  Representative sections are submitted in 3 cassettes.   [place]      Microscopic Description       Microscopic examination performed.          Objective   Vital Signs:   There were no vitals taken for this visit.    Physical Exam  Vitals and nursing note reviewed.   Constitutional:       General: She is not in acute distress.     Appearance: Normal appearance. She is well-developed.   HENT:      Head: Normocephalic and atraumatic.   Eyes:      Extraocular Movements: Extraocular movements intact.      Pupils: Pupils are equal, round, and reactive to light.   Cardiovascular:      Pulses: Normal pulses.   Pulmonary:      Effort: Pulmonary effort is normal. No retractions.      Breath sounds: Normal air entry. No wheezing.   Abdominal:      General: There is no distension.      Palpations: Abdomen is soft.      Tenderness: There is no abdominal tenderness.      Hernia: No hernia is present.   Musculoskeletal:         General: No swelling or deformity.      Cervical back: Neck supple.   Skin:     General: Skin is warm and dry.      Findings: No erythema.      Comments: Surgical Incision Healing Well   Neurological:      General: No focal deficit present.      Mental Status: She is alert and oriented to person, place, and time.      Motor: Motor function is intact.   Psychiatric:         Mood and Affect: Mood normal.         Thought Content: Thought content normal.            Result Review :                 Assessment and Plan    Diagnoses and all orders for this visit:    1. Lipoma of right lower extremity (Primary)    Follow up prn    Follow Up   Return if symptoms worsen or fail to improve.  Patient was given instructions and counseling regarding her condition or for health maintenance advice. " Please see specific information pulled into the AVS if appropriate.

## 2025-01-09 ENCOUNTER — OFFICE VISIT (OUTPATIENT)
Dept: FAMILY MEDICINE CLINIC | Facility: CLINIC | Age: 60
End: 2025-01-09
Payer: OTHER GOVERNMENT

## 2025-01-09 VITALS
TEMPERATURE: 98.1 F | BODY MASS INDEX: 22.49 KG/M2 | HEART RATE: 60 BPM | WEIGHT: 135 LBS | DIASTOLIC BLOOD PRESSURE: 80 MMHG | OXYGEN SATURATION: 100 % | SYSTOLIC BLOOD PRESSURE: 130 MMHG | HEIGHT: 65 IN

## 2025-01-09 DIAGNOSIS — E53.8 VITAMIN B12 DEFICIENCY: ICD-10-CM

## 2025-01-09 DIAGNOSIS — R19.5 ABNORMAL STOOL COLOR: Primary | ICD-10-CM

## 2025-01-09 DIAGNOSIS — R20.2 PARESTHESIA OF SKIN: ICD-10-CM

## 2025-01-09 LAB
ALBUMIN SERPL-MCNC: 4.4 G/DL (ref 3.5–5.2)
ALBUMIN/GLOB SERPL: 1.6 G/DL
ALP SERPL-CCNC: 69 U/L (ref 39–117)
ALT SERPL W P-5'-P-CCNC: 34 U/L (ref 1–33)
ANION GAP SERPL CALCULATED.3IONS-SCNC: 9 MMOL/L (ref 5–15)
AST SERPL-CCNC: 30 U/L (ref 1–32)
BILIRUB SERPL-MCNC: 0.5 MG/DL (ref 0–1.2)
BUN SERPL-MCNC: 15 MG/DL (ref 6–20)
BUN/CREAT SERPL: 27.3 (ref 7–25)
CALCIUM SPEC-SCNC: 9.5 MG/DL (ref 8.6–10.5)
CHLORIDE SERPL-SCNC: 103 MMOL/L (ref 98–107)
CO2 SERPL-SCNC: 27 MMOL/L (ref 22–29)
CREAT SERPL-MCNC: 0.55 MG/DL (ref 0.57–1)
EGFRCR SERPLBLD CKD-EPI 2021: 105.7 ML/MIN/1.73
FOLATE SERPL-MCNC: 15.9 NG/ML (ref 4.78–24.2)
GLOBULIN UR ELPH-MCNC: 2.8 GM/DL
GLUCOSE SERPL-MCNC: 83 MG/DL (ref 65–99)
POTASSIUM SERPL-SCNC: 4.5 MMOL/L (ref 3.5–5.2)
PROT SERPL-MCNC: 7.2 G/DL (ref 6–8.5)
SODIUM SERPL-SCNC: 139 MMOL/L (ref 136–145)
VIT B12 BLD-MCNC: 483 PG/ML (ref 211–946)

## 2025-01-09 PROCEDURE — 36415 COLL VENOUS BLD VENIPUNCTURE: CPT | Performed by: NURSE PRACTITIONER

## 2025-01-09 PROCEDURE — 82746 ASSAY OF FOLIC ACID SERUM: CPT | Performed by: NURSE PRACTITIONER

## 2025-01-09 PROCEDURE — 82274 ASSAY TEST FOR BLOOD FECAL: CPT | Performed by: NURSE PRACTITIONER

## 2025-01-09 PROCEDURE — 82607 VITAMIN B-12: CPT | Performed by: NURSE PRACTITIONER

## 2025-01-09 PROCEDURE — 87338 HPYLORI STOOL AG IA: CPT | Performed by: NURSE PRACTITIONER

## 2025-01-09 PROCEDURE — 80053 COMPREHEN METABOLIC PANEL: CPT | Performed by: NURSE PRACTITIONER

## 2025-01-09 PROCEDURE — 99214 OFFICE O/P EST MOD 30 MIN: CPT | Performed by: NURSE PRACTITIONER

## 2025-01-09 PROCEDURE — 87505 NFCT AGENT DETECTION GI: CPT | Performed by: NURSE PRACTITIONER

## 2025-01-09 NOTE — PROGRESS NOTES
Chief Complaint  Tingling (LT side , since October 2024)    Subjective        Medical History: has a past medical history of Arthritis, Cervical pain (neck) (01/20/2020), Fibroid, Headache, Leiomyoma, Lipoma of right upper extremity (02/02/2021), Neural foraminal stenosis of cervical spine (01/20/2020), Palpitations, Right hip pain (02/02/2021), Right patella fracture (CURRENTLY), Superficial thrombophlebitis, and Varicose veins of lower extremity with pain, left (06/19/2020).     Surgical History: has a past surgical history that includes Gallbladder surgery (2018); Lipoma Excision; Patella Open Reduction Internal Fixation (Right, 08/20/2021); Knee surgery (Left, 2024); Hardware Removal (Right, 09/23/2024); Mole removal; and Excision Lesion (N/A, 12/10/2024).     Family History: family history includes Alzheimer's disease in her mother; Breast cancer in her maternal aunt; Deep vein thrombosis in her maternal aunt; Lung cancer in her father; Prostate cancer in her father.     Social History: reports that she quit smoking about 26 years ago. Her smoking use included cigarettes. She started smoking about 31 years ago. She has a 1.3 pack-year smoking history. She has never used smokeless tobacco. She reports that she does not currently use alcohol. She reports that she does not use drugs.    Leola Montana presents to [facility]    History of Present Illness    History of Present Illness  The patient presents for evaluation of a tingling sensation on her left side and abnormal stools.    She reports experiencing a persistent tingling sensation localized to the left side of her body, extending from the area beneath her left breast to the mid-back region. This symptom has been present since 10/2024, with no identifiable triggering event. Despite her active lifestyle, which includes regular workouts and machine use, she has not observed any improvement in her condition. The tingling  "sensation is intermittent, often manifesting during sleep or upon awakening, but is notably absent during daytime hours. She does not experience any associated pain or rash. Additionally, she reports no exacerbation of symptoms upon palpation, nor any spinal or back pain.       She sought medical attention at an urgent care facility on 11/13/2024 due to the presence of an abnormal, jelly-like substance in her stool. She was advised to consult with her primary care physician. She recalls a recent rectal examination performed by her gynecologist, after which she noticed the aforementioned change in her stool. This abnormality persisted for approximately three days before resolving spontaneously. She reports no recurrence of these symptoms since the initial episode. She also reports no watery stools, abdominal pain, cramping, or hematochezia. However, she does report a temporary increase in stool softness during the three-day period. She has not undergone any stool testing to date.      Objective   Vital Signs:   /80   Pulse 60   Temp 98.1 °F (36.7 °C)   Ht 165.1 cm (65\")   Wt 61.2 kg (135 lb)   SpO2 100%   BMI 22.47 kg/m²       Wt Readings from Last 3 Encounters:   01/09/25 61.2 kg (135 lb)   12/10/24 61.1 kg (134 lb 11.2 oz)   11/13/24 60.3 kg (133 lb)        BP Readings from Last 3 Encounters:   01/09/25 130/80   12/10/24 116/91   11/13/24 108/50        BMI is within normal parameters. No other follow-up for BMI required.       Physical Exam  Vitals reviewed.   Constitutional:       Appearance: Normal appearance. She is well-developed.   HENT:      Head: Normocephalic and atraumatic.   Eyes:      Conjunctiva/sclera: Conjunctivae normal.      Pupils: Pupils are equal, round, and reactive to light.   Cardiovascular:      Rate and Rhythm: Normal rate and regular rhythm.      Heart sounds: No murmur heard.  Pulmonary:      Effort: Pulmonary effort is normal.      Breath sounds: Normal breath sounds. No " wheezing.   Chest:       Abdominal:      General: Abdomen is flat. Bowel sounds are normal.      Palpations: Abdomen is soft.      Tenderness: There is no abdominal tenderness.   Musculoskeletal:         General: No tenderness.        Back:    Skin:     General: Skin is warm and dry.   Neurological:      Mental Status: She is alert and oriented to person, place, and time.   Psychiatric:         Mood and Affect: Mood and affect normal.         Behavior: Behavior normal.         Thought Content: Thought content normal.         Judgment: Judgment normal.        Result Review :  {The following data was reviewed by CHEYANNE West on 01/09/2025.  Common labs          3/21/2024    08:39 8/1/2024    08:49 11/4/2024    14:17   Common Labs   Glucose 101  83     BUN 18  14     Creatinine 0.61  0.71     Sodium 140  141     Potassium 4.8  4.7     Chloride 106  108     Calcium 9.7  9.1     Albumin 4.4  4.3     Total Bilirubin 0.6  0.3     Alkaline Phosphatase 59  51     AST (SGOT) 23  23     ALT (SGPT) 16  15     WBC 4.78      Hemoglobin 13.3      Hematocrit 39.6      Platelets 327      Total Cholesterol 195      Triglycerides 84      HDL Cholesterol 59      LDL Cholesterol  121      Hemoglobin A1C   5.30      Data reviewed : Previous office note and urgent care note             Current Outpatient Medications on File Prior to Visit   Medication Sig Dispense Refill    acetaminophen (TYLENOL) 500 MG tablet Take 1 tablet by mouth Every 6 (Six) Hours As Needed for Mild Pain or Headache.      fluticasone (FLONASE) 50 MCG/ACT nasal spray Administer 2 sprays into the nostril(s) as directed by provider Daily. (Patient taking differently: Administer 2 sprays into the nostril(s) as directed by provider Daily. PRN)      lidocaine (LIDODERM) 5 % Place 1 patch on the skin as directed by provider Daily. Remove & Discard patch within 12 hours or as directed by MD (Patient taking differently: Place 1 patch on the skin as directed by  provider Daily As Needed. Remove & Discard patch within 12 hours or as directed by MD) 30 patch 1    HYDROcodone-acetaminophen (NORCO) 5-325 MG per tablet Take 1 tablet by mouth Every 6 (Six) Hours As Needed for Moderate Pain (Pain). (Patient not taking: Reported on 1/9/2025) 20 tablet 0    multivitamin with minerals tablet tablet Take 1 tablet by mouth Daily. (Patient not taking: Reported on 1/9/2025)       No current facility-administered medications on file prior to visit.        Assessment and Plan  Diagnoses and all orders for this visit:    1. Abnormal stool color (Primary)  -     H. Pylori Antigen, Stool - Stool, Per Rectum; Future  -     Occult Blood, Fecal By Immunoassay - Stool, Per Rectum; Future  -     Enteric Bacterial Panel - Stool, Per Rectum; Future  -     CT Abdomen Pelvis With & Without Contrast; Future  -     H. Pylori Antigen, Stool - Stool, Per Rectum  -     Occult Blood, Fecal By Immunoassay - Stool, Per Rectum  -     Enteric Bacterial Panel - Stool, Per Rectum    2. Paresthesia of left chest wall  -     CT Chest Without Contrast; Future  -     Comprehensive Metabolic Panel  -     Vitamin B12  -     Folate    3. Vitamin B12 deficiency  -     Vitamin B12  -     Folate        Assessment & Plan  1. Tingling sensation on the left side.  The tingling sensation under the left breast extending to the middle of the back has been ongoing since October. It is not associated with pain, rash, or exacerbation upon palpation. Differential diagnoses include nerve irritation or inflammation within the cartilage of the ribs. A CT scan of the chest and abdomen will be ordered to rule out any underlying abnormalities. A comprehensive metabolic panel (CMP) will also be conducted to ensure normal liver enzyme levels and overall health status.    2. Abnormal stools.  The patient reported a jelly-like substance in her stool for about three days following a rectal exam. No recurrence has been noted since. A stool  sample will be collected for laboratory analysis to check for any bacterial infections or other abnormalities.    PROCEDURE  The patient underwent surgical removal of lipomas approximately one month ago.      Follow Up   Return for If symptoms do not improve new concerning symptoms.  Patient was given instructions and counseling regarding her condition or for health maintenance advice. Please see specific information pulled into the AVS if appropriate.       Part of this note may be electronic transcription/translation of spoken language to printed text using the Dragon dictation system    Patient or patient representative verbalized consent for the use of Ambient Listening during the visit with  CHEYANNE West for chart documentation. 1/9/2025  09:04 EST   n/a

## 2025-01-10 LAB
C COLI+JEJ+UPSA DNA STL QL NAA+NON-PROBE: NOT DETECTED
EC STX1+STX2 GENES STL QL NAA+NON-PROBE: NOT DETECTED
H. PYLORI ANTIGEN STOOL: NEGATIVE
HEMOCCULT STL QL IA: NEGATIVE
S ENT+BONG DNA STL QL NAA+NON-PROBE: NOT DETECTED
SHIGELLA SP+EIEC IPAH ST NAA+NON-PROBE: NOT DETECTED

## 2025-01-22 ENCOUNTER — HOSPITAL ENCOUNTER (OUTPATIENT)
Dept: CT IMAGING | Facility: HOSPITAL | Age: 60
Discharge: HOME | End: 2025-01-22
Admitting: NURSE PRACTITIONER
Payer: OTHER GOVERNMENT

## 2025-01-22 DIAGNOSIS — R19.5 ABNORMAL STOOL COLOR: ICD-10-CM

## 2025-01-22 DIAGNOSIS — R20.2 PARESTHESIA OF SKIN: ICD-10-CM

## 2025-01-22 PROCEDURE — 71250 CT THORAX DX C-: CPT

## 2025-01-22 PROCEDURE — 74176 CT ABD & PELVIS W/O CONTRAST: CPT

## 2025-01-27 ENCOUNTER — OFFICE VISIT (OUTPATIENT)
Dept: FAMILY MEDICINE CLINIC | Facility: CLINIC | Age: 60
End: 2025-01-27
Payer: OTHER GOVERNMENT

## 2025-01-27 VITALS
BODY MASS INDEX: 22.33 KG/M2 | HEIGHT: 65 IN | WEIGHT: 134 LBS | HEART RATE: 68 BPM | OXYGEN SATURATION: 99 % | TEMPERATURE: 97.1 F

## 2025-01-27 DIAGNOSIS — E78.2 MIXED HYPERLIPIDEMIA: ICD-10-CM

## 2025-01-27 DIAGNOSIS — I25.10 CORONARY ARTERY DISEASE INVOLVING NATIVE CORONARY ARTERY OF NATIVE HEART WITHOUT ANGINA PECTORIS: ICD-10-CM

## 2025-01-27 DIAGNOSIS — Z13.220 LIPID SCREENING: ICD-10-CM

## 2025-01-27 DIAGNOSIS — I82.890 SUPERFICIAL VEIN THROMBOSIS: Primary | ICD-10-CM

## 2025-01-27 DIAGNOSIS — I83.12 VARICOSE VEINS OF LEFT LOWER EXTREMITY WITH INFLAMMATION: ICD-10-CM

## 2025-01-27 PROCEDURE — 99214 OFFICE O/P EST MOD 30 MIN: CPT | Performed by: NURSE PRACTITIONER

## 2025-01-27 NOTE — PROGRESS NOTES
Chief Complaint  Results (Ct Testing would like to go over.)    Subjective        Medical History: has a past medical history of Arthritis, Cervical pain (neck) (01/20/2020), Fibroid, Headache, Leiomyoma, Lipoma of right upper extremity (02/02/2021), Neural foraminal stenosis of cervical spine (01/20/2020), Palpitations, Right hip pain (02/02/2021), Right patella fracture (CURRENTLY), Superficial thrombophlebitis, and Varicose veins of lower extremity with pain, left (06/19/2020).     Surgical History: has a past surgical history that includes Gallbladder surgery (2018); Lipoma Excision; Patella Open Reduction Internal Fixation (Right, 08/20/2021); Knee surgery (Left, 2024); Hardware Removal (Right, 09/23/2024); Mole removal; and Excision Lesion (N/A, 12/10/2024).     Family History: family history includes Alzheimer's disease in her mother; Breast cancer in her maternal aunt; Deep vein thrombosis in her maternal aunt; Lung cancer in her father; Prostate cancer in her father.     Social History: reports that she quit smoking about 26 years ago. Her smoking use included cigarettes. She started smoking about 31 years ago. She has a 1.3 pack-year smoking history. She has never used smokeless tobacco. She reports that she does not currently use alcohol. She reports that she does not use drugs.    Leola Montana presents to [facility]    History of Present Illness    History of Present Illness  The patient presents for evaluation of kidney stone, arterial plaque, fibroids, varicose veins, and constipation.    She has been diagnosed with a renal calculus, which is currently asymptomatic.    She expresses concern regarding the presence of arterial plaque, which is of moderate size. She also reports a history of hypercholesterolemia. She does not report any family history of cardiovascular events such as myocardial infarction or cerebrovascular accident. She does not experience any chest  "discomfort.    She has a known history of uterine fibroids, which have been present for several years. Her gynecologist has informed her that these fibroids typically reduce in size post-menopause. She does not believe there has been any significant reduction in their size at this point.    She has a history of thrombosis in her varicose veins. She was previously scheduled for a procedure in [place], but due to the departure of her physician, the procedure was not performed. She has since undergone other surgical interventions and has not had the opportunity to reschedule the initial procedure.    She reports experiencing intermittent constipation, which she attributes to inadequate fluid intake.    FAMILY HISTORY  She does not report any family history of heart attack or stroke.      Objective   Vital Signs:   Pulse 68   Temp 97.1 °F (36.2 °C)   Ht 165.1 cm (65\")   Wt 60.8 kg (134 lb)   SpO2 99%   BMI 22.30 kg/m²       Wt Readings from Last 3 Encounters:   01/27/25 60.8 kg (134 lb)   01/09/25 61.2 kg (135 lb)   12/10/24 61.1 kg (134 lb 11.2 oz)        BP Readings from Last 3 Encounters:   01/09/25 130/80   12/10/24 116/91   11/13/24 108/50        BMI is within normal parameters. No other follow-up for BMI required.       Physical Exam  Vitals reviewed.   Constitutional:       Appearance: Normal appearance. She is well-developed.   HENT:      Head: Normocephalic and atraumatic.   Eyes:      Conjunctiva/sclera: Conjunctivae normal.      Pupils: Pupils are equal, round, and reactive to light.   Cardiovascular:      Rate and Rhythm: Normal rate and regular rhythm.      Heart sounds: No murmur heard.  Pulmonary:      Effort: Pulmonary effort is normal.      Breath sounds: Normal breath sounds. No wheezing.   Skin:     General: Skin is warm and dry.   Neurological:      Mental Status: She is alert and oriented to person, place, and time.   Psychiatric:         Mood and Affect: Mood and affect normal.         " Behavior: Behavior normal.         Thought Content: Thought content normal.         Judgment: Judgment normal.        Result Review :  {The following data was reviewed by CHEYANNE West on 01/27/2025.  Common labs          8/1/2024    08:49 11/4/2024    14:17 1/9/2025    09:21   Common Labs   Glucose 83   83    BUN 14   15    Creatinine 0.71   0.55    Sodium 141   139    Potassium 4.7   4.5    Chloride 108   103    Calcium 9.1   9.5    Albumin 4.3   4.4    Total Bilirubin 0.3   0.5    Alkaline Phosphatase 51   69    AST (SGOT) 23   30    ALT (SGPT) 15   34    Hemoglobin A1C  5.30       Data reviewed : Previous office note         CT Chest Without Contrast Diagnostic    Result Date: 1/24/2025  Impression: Normal unenhanced CT scan of the chest. Electronically Signed: Sundeep Tucker MD  1/24/2025 10:53 AM EST  Workstation ID: [id number]    CT Abdomen Pelvis Without Contrast    Result Date: 1/22/2025  Impression: 1. No acute abnormality in the abdomen or pelvis. 2. Lobulated uterus with multiple calcified fibroids. 3. Nonobstructing 2 mm right renal calculus. 4. Prior cholecystectomy. Electronically Signed: Francis Dolan MD  1/22/2025 5:00 PM EST  Workstation ID: [id number]      Current Outpatient Medications on File Prior to Visit   Medication Sig Dispense Refill    acetaminophen (TYLENOL) 500 MG tablet Take 1 tablet by mouth Every 6 (Six) Hours As Needed for Mild Pain or Headache.      fluticasone (FLONASE) 50 MCG/ACT nasal spray Administer 2 sprays into the nostril(s) as directed by provider Daily. (Patient taking differently: Administer 2 sprays into the nostril(s) as directed by provider Daily. PRN)      lidocaine (LIDODERM) 5 % Place 1 patch on the skin as directed by provider Daily. Remove & Discard patch within 12 hours or as directed by MD (Patient taking differently: Place 1 patch on the skin as directed by provider Daily As Needed. Remove & Discard patch within 12 hours or as directed by MD) 30 patch 1     HYDROcodone-acetaminophen (NORCO) 5-325 MG per tablet Take 1 tablet by mouth Every 6 (Six) Hours As Needed for Moderate Pain (Pain). (Patient not taking: Reported on 1/27/2025) 20 tablet 0    multivitamin with minerals tablet tablet Take 1 tablet by mouth Daily. (Patient not taking: Reported on 1/27/2025)       No current facility-administered medications on file prior to visit.        Assessment and Plan  Diagnoses and all orders for this visit:    1. Superficial vein thrombosis (Primary)  -     Ambulatory Referral to Vascular Surgery    2. Varicose veins of left lower extremity with inflammation  -     Ambulatory Referral to Vascular Surgery    3. Coronary artery disease involving native coronary artery of native heart without angina pectoris  -     CT Cardiac Calcium Score Without Dye; Future    4. Lipid screening    5. Mixed hyperlipidemia  -     CBC With Manual Differential; Future  -     Comprehensive metabolic panel; Future  -     Lipid Panel; Future        Assessment & Plan  1. Renal calculus.  The renal calculus is currently asymptomatic and does not necessitate any immediate intervention.    2. Arterial plaque.  A fasting lipid profile will be ordered to assess her cholesterol levels. Additionally, a coronary calcium scan will be conducted to evaluate the extent of arterial and venous plaque. If the calcium score is elevated, aspirin therapy will be initiated, and a referral to cardiology for a stress test will be made. If the score is not significantly high but still above normal, cholesterol-lowering medication and aspirin will be considered, with close monitoring.    3. Uterine fibroids.  The fibroids are expected to reduce in size post-menopause.    4. Varicose veins.  A referral to [facility] in [place] will be made for further evaluation and management of her varicose veins.    5. Constipation.  She has been advised to increase her water intake to aid in stool softening and promote regular  bowel movements.      Follow Up   Return in about 6 months (around 7/27/2025) for Recheck.  Patient was given instructions and counseling regarding her condition or for health maintenance advice. Please see specific information pulled into the AVS if appropriate.       Part of this note may be electronic transcription/translation of spoken language to printed text using the Dragon dictation system    Patient or patient representative verbalized consent for the use of Ambient Listening during the visit with  CHEYANNE West for chart documentation. 1/27/2025  16:08 EST

## 2025-01-28 ENCOUNTER — CLINICAL SUPPORT (OUTPATIENT)
Dept: FAMILY MEDICINE CLINIC | Facility: CLINIC | Age: 60
End: 2025-01-28
Payer: OTHER GOVERNMENT

## 2025-01-28 DIAGNOSIS — E78.2 MIXED HYPERLIPIDEMIA: ICD-10-CM

## 2025-01-28 LAB
ALBUMIN SERPL-MCNC: 4 G/DL (ref 3.5–5.2)
ALBUMIN/GLOB SERPL: 1.3 G/DL
ALP SERPL-CCNC: 74 U/L (ref 39–117)
ALT SERPL W P-5'-P-CCNC: 35 U/L (ref 1–33)
ANION GAP SERPL CALCULATED.3IONS-SCNC: 9.2 MMOL/L (ref 5–15)
AST SERPL-CCNC: 28 U/L (ref 1–32)
BASOPHILS # BLD MANUAL: 0.05 10*3/MM3 (ref 0–0.2)
BASOPHILS NFR BLD MANUAL: 1 % (ref 0–1.5)
BILIRUB SERPL-MCNC: 0.6 MG/DL (ref 0–1.2)
BUN SERPL-MCNC: 17 MG/DL (ref 6–20)
BUN/CREAT SERPL: 22.4 (ref 7–25)
CALCIUM SPEC-SCNC: 9.6 MG/DL (ref 8.6–10.5)
CHLORIDE SERPL-SCNC: 103 MMOL/L (ref 98–107)
CHOLEST SERPL-MCNC: 214 MG/DL (ref 0–200)
CO2 SERPL-SCNC: 26.8 MMOL/L (ref 22–29)
CREAT SERPL-MCNC: 0.76 MG/DL (ref 0.57–1)
DEPRECATED RDW RBC AUTO: 42.3 FL (ref 37–54)
EGFRCR SERPLBLD CKD-EPI 2021: 90.4 ML/MIN/1.73
EOSINOPHIL # BLD MANUAL: 0.32 10*3/MM3 (ref 0–0.4)
EOSINOPHIL NFR BLD MANUAL: 6.1 % (ref 0.3–6.2)
ERYTHROCYTE [DISTWIDTH] IN BLOOD BY AUTOMATED COUNT: 12.9 % (ref 12.3–15.4)
GLOBULIN UR ELPH-MCNC: 3.2 GM/DL
GLUCOSE SERPL-MCNC: 92 MG/DL (ref 65–99)
HCT VFR BLD AUTO: 40.3 % (ref 34–46.6)
HDLC SERPL-MCNC: 66 MG/DL (ref 40–60)
HGB BLD-MCNC: 13.7 G/DL (ref 12–15.9)
LDLC SERPL CALC-MCNC: 136 MG/DL (ref 0–100)
LDLC/HDLC SERPL: 2.04 {RATIO}
LYMPHOCYTES # BLD MANUAL: 1.55 10*3/MM3 (ref 0.7–3.1)
LYMPHOCYTES NFR BLD MANUAL: 3.1 % (ref 5–12)
MCH RBC QN AUTO: 30.2 PG (ref 26.6–33)
MCHC RBC AUTO-ENTMCNC: 34 G/DL (ref 31.5–35.7)
MCV RBC AUTO: 89 FL (ref 79–97)
MONOCYTES # BLD: 0.16 10*3/MM3 (ref 0.1–0.9)
NEUTROPHILS # BLD AUTO: 3.16 10*3/MM3 (ref 1.7–7)
NEUTROPHILS NFR BLD MANUAL: 60.2 % (ref 42.7–76)
PLAT MORPH BLD: NORMAL
PLATELET # BLD AUTO: 303 10*3/MM3 (ref 140–450)
PMV BLD AUTO: 9.6 FL (ref 6–12)
POTASSIUM SERPL-SCNC: 4.8 MMOL/L (ref 3.5–5.2)
PROT SERPL-MCNC: 7.2 G/DL (ref 6–8.5)
RBC # BLD AUTO: 4.53 10*6/MM3 (ref 3.77–5.28)
RBC MORPH BLD: NORMAL
SMUDGE CELLS BLD QL SMEAR: ABNORMAL
SODIUM SERPL-SCNC: 139 MMOL/L (ref 136–145)
TRIGL SERPL-MCNC: 68 MG/DL (ref 0–150)
VARIANT LYMPHS NFR BLD MANUAL: 29.6 % (ref 19.6–45.3)
VLDLC SERPL-MCNC: 12 MG/DL (ref 5–40)
WBC NRBC COR # BLD AUTO: 5.25 10*3/MM3 (ref 3.4–10.8)

## 2025-01-28 PROCEDURE — 36415 COLL VENOUS BLD VENIPUNCTURE: CPT | Performed by: NURSE PRACTITIONER

## 2025-01-28 PROCEDURE — 80053 COMPREHEN METABOLIC PANEL: CPT | Performed by: NURSE PRACTITIONER

## 2025-01-28 PROCEDURE — 85027 COMPLETE CBC AUTOMATED: CPT | Performed by: NURSE PRACTITIONER

## 2025-01-28 PROCEDURE — 80061 LIPID PANEL: CPT | Performed by: NURSE PRACTITIONER

## 2025-01-28 PROCEDURE — 85007 BL SMEAR W/DIFF WBC COUNT: CPT | Performed by: NURSE PRACTITIONER

## 2025-02-06 ENCOUNTER — OFFICE VISIT (OUTPATIENT)
Dept: FAMILY MEDICINE CLINIC | Facility: CLINIC | Age: 60
End: 2025-02-06
Payer: OTHER GOVERNMENT

## 2025-02-06 VITALS
WEIGHT: 135.6 LBS | DIASTOLIC BLOOD PRESSURE: 70 MMHG | SYSTOLIC BLOOD PRESSURE: 112 MMHG | OXYGEN SATURATION: 99 % | BODY MASS INDEX: 22.59 KG/M2 | TEMPERATURE: 97.9 F | HEIGHT: 65 IN | HEART RATE: 66 BPM

## 2025-02-06 DIAGNOSIS — Z71.2 ENCOUNTER TO DISCUSS TEST RESULTS: ICD-10-CM

## 2025-02-06 DIAGNOSIS — Z09 FOLLOW-UP EXAM: Primary | ICD-10-CM

## 2025-02-06 PROCEDURE — 99213 OFFICE O/P EST LOW 20 MIN: CPT | Performed by: NURSE PRACTITIONER

## 2025-02-06 NOTE — PROGRESS NOTES
Chief Complaint  Results (labwork)    Subjective        Medical History: has a past medical history of Arthritis, Cervical pain (neck) (01/20/2020), Fibroid, Headache, Leiomyoma, Lipoma of right upper extremity (02/02/2021), Neural foraminal stenosis of cervical spine (01/20/2020), Palpitations, Right hip pain (02/02/2021), Right patella fracture (CURRENTLY), Superficial thrombophlebitis, and Varicose veins of lower extremity with pain, left (06/19/2020).     Surgical History: has a past surgical history that includes Gallbladder surgery (2018); Lipoma Excision; Patella Open Reduction Internal Fixation (Right, 08/20/2021); Knee surgery (Left, 2024); Hardware Removal (Right, 09/23/2024); Mole removal; and Excision Lesion (N/A, 12/10/2024).     Family History: family history includes Alzheimer's disease in her mother; Breast cancer in her maternal aunt; Deep vein thrombosis in her maternal aunt; Lung cancer in her father; Prostate cancer in her father.     Social History: reports that she quit smoking about 26 years ago. Her smoking use included cigarettes. She started smoking about 31 years ago. She has a 1.3 pack-year smoking history. She has never used smokeless tobacco. She reports that she does not currently use alcohol. She reports that she does not use drugs.    Leola Montana presents to [facility]    History of Present Illness    History of Present Illness  The patient presents for evaluation of elevated cholesterol, elevated liver enzymes, and elevated monocytes.    She has expressed concerns regarding her elevated cholesterol levels, which have shown an increase compared to previous readings. She maintains a regular exercise regimen but acknowledges the need for dietary modifications. Additionally, she reports a history of arterial plaque accumulation seen on recent CT scan of chest abdomen and pelvis.    She is also concerned about her elevated monocyte % count.    She is  "concerned about her elevated liver enzymes. She has been experiencing pain on the left side of her abdomen.      Objective   Vital Signs:   /70   Pulse 66   Temp 97.9 °F (36.6 °C)   Ht 165.1 cm (65\")   Wt 61.5 kg (135 lb 9.6 oz)   SpO2 99%   BMI 22.57 kg/m²       Wt Readings from Last 3 Encounters:   02/06/25 61.5 kg (135 lb 9.6 oz)   01/27/25 60.8 kg (134 lb)   01/09/25 61.2 kg (135 lb)        BP Readings from Last 3 Encounters:   02/06/25 112/70   01/09/25 130/80   12/10/24 116/91        BMI is within normal parameters. No other follow-up for BMI required.       Physical Exam  Vitals reviewed.   Constitutional:       Appearance: Normal appearance. She is well-developed.   HENT:      Head: Normocephalic and atraumatic.   Eyes:      Conjunctiva/sclera: Conjunctivae normal.      Pupils: Pupils are equal, round, and reactive to light.   Cardiovascular:      Rate and Rhythm: Normal rate.   Pulmonary:      Effort: Pulmonary effort is normal.   Skin:     General: Skin is warm and dry.   Neurological:      Mental Status: She is alert and oriented to person, place, and time.   Psychiatric:         Mood and Affect: Mood and affect normal.         Behavior: Behavior normal.         Thought Content: Thought content normal.         Judgment: Judgment normal.        Result Review :  {The following data was reviewed by CHEYANNE West on 02/06/2025.  Common labs          11/4/2024    14:17 1/9/2025    09:21 1/28/2025    08:26   Common Labs   Glucose  83  92    BUN  15  17    Creatinine  0.55  0.76    Sodium  139  139    Potassium  4.5  4.8    Chloride  103  103    Calcium  9.5  9.6    Albumin  4.4  4.0    Total Bilirubin  0.5  0.6    Alkaline Phosphatase  69  74    AST (SGOT)  30  28    ALT (SGPT)  34  35    WBC   5.25    Hemoglobin   13.7    Hematocrit   40.3    Platelets   303    Total Cholesterol   214    Triglycerides   68    HDL Cholesterol   66    LDL Cholesterol    136    Hemoglobin A1C 5.30    "     Data reviewed : Recent radiology notes         CT Chest Without Contrast Diagnostic    Result Date: 1/24/2025  Impression: Normal unenhanced CT scan of the chest. Electronically Signed: Sundeep Tucker MD  1/24/2025 10:53 AM EST  Workstation ID: [id number]    CT Abdomen Pelvis Without Contrast    Result Date: 1/22/2025  Impression: 1. No acute abnormality in the abdomen or pelvis. 2. Lobulated uterus with multiple calcified fibroids. 3. Nonobstructing 2 mm right renal calculus. 4. Prior cholecystectomy. Electronically Signed: Francis Dolan MD  1/22/2025 5:00 PM EST  Workstation ID: [id number]      Current Outpatient Medications on File Prior to Visit   Medication Sig Dispense Refill    acetaminophen (TYLENOL) 500 MG tablet Take 1 tablet by mouth Every 6 (Six) Hours As Needed for Mild Pain or Headache.      fluticasone (FLONASE) 50 MCG/ACT nasal spray Administer 2 sprays into the nostril(s) as directed by provider Daily. (Patient taking differently: Administer 2 sprays into the nostril(s) as directed by provider Daily. PRN)      HYDROcodone-acetaminophen (NORCO) 5-325 MG per tablet Take 1 tablet by mouth Every 6 (Six) Hours As Needed for Moderate Pain (Pain). 20 tablet 0    lidocaine (LIDODERM) 5 % Place 1 patch on the skin as directed by provider Daily. Remove & Discard patch within 12 hours or as directed by MD (Patient taking differently: Place 1 patch on the skin as directed by provider Daily As Needed. Remove & Discard patch within 12 hours or as directed by MD) 30 patch 1    multivitamin with minerals tablet tablet Take 1 tablet by mouth Daily.       No current facility-administered medications on file prior to visit.        Assessment and Plan  Diagnoses and all orders for this visit:    1. Follow-up exam (Primary)    2. Encounter to discuss test results        Assessment & Plan  1. Elevated cholesterol levels.  Her LDL cholesterol is 36 points above the normal range. The goal is to manage this condition without  resorting to medication. She has been advised to adopt a low-cholesterol diet and maintain regular exercise. A calcium score test has been ordered to assess the extent of arterial plaque buildup. If the calcium score indicates moderate to severe plaque buildup, pharmacological intervention will be considered, including the potential addition of baby aspirin to her regimen.    2. Elevated liver enzymes.  Previous imaging results have shown no abnormalities in her liver. The elevated liver enzymes could be a result of her high cholesterol levels. She has been advised to follow a low-fat, low-cholesterol diet to help reduce these levels. If the liver enzymes do not decrease, further investigation will be necessary.    3. Elevated monocytes.  Her monocyte percentage is elevated, but all other white blood cell counts are normal. No immediate action is required unless there is an increase in lymphocytes or absolute monocytes or other white count abnormality.      Follow Up   Return in about 6 months (around 8/6/2025) for Recheck.  Patient was given instructions and counseling regarding her condition or for health maintenance advice. Please see specific information pulled into the AVS if appropriate.       Part of this note may be electronic transcription/translation of spoken language to printed text using the Dragon dictation system    Patient or patient representative verbalized consent for the use of Ambient Listening during the visit with  CHEYANNE West for chart documentation. 2/6/2025  11:26 EST

## 2025-02-07 ENCOUNTER — HOSPITAL ENCOUNTER (OUTPATIENT)
Dept: CT IMAGING | Facility: HOSPITAL | Age: 60
Discharge: HOME | End: 2025-02-07
Admitting: NURSE PRACTITIONER

## 2025-02-07 DIAGNOSIS — I25.10 CORONARY ARTERY DISEASE INVOLVING NATIVE CORONARY ARTERY OF NATIVE HEART WITHOUT ANGINA PECTORIS: ICD-10-CM

## 2025-02-07 PROCEDURE — 75571 CT HRT W/O DYE W/CA TEST: CPT

## 2025-05-06 NOTE — PROGRESS NOTES
"Chief Complaint  Varicose Veins (Pain, swelling, had DVT previous /Was going to get EVLA from Dr Rodriguez, )    Subjective      History of Present Illness  Leola Montana presents to [facility] as a new patient with complaints of left lower extremity varicose vein.  Patient informs me that she was a formal patient of Dr. Rodriguez and that she was set up to have ablation procedure for her left leg in October 2024; however, she did not get to have that procedure due to the departure of Dr. Rodriguez.  She is here today because she is continuing to have issues with her left leg.  She feel like the vein has gotten bigger and has extended further up her medial thigh toward her groin.  Reports aching, heaviness, fatigue,  throbbing in the left leg, worse with prolonged standing and/or at the end of the day.  These symptoms began approximately 31 ago and have gradually worsened. Reports family history of varicose vein involving her mother.  Denies recent trauma.  Denies history of DVTs; however, she has had superficial thrombophlebitis of the left great saphenous vein below the knee in 2023.  No family history of any DVTs.  At this point in time attempts at symptomatic control using elevation, compression and nonsteroidals have been attempted.  Patient describes the discomfort from the veins as affecting their daily life.      Allergies: Prometrium [progesterone]     Leola Montana  reports that she quit smoking about 26 years ago. Her smoking use included cigarettes. She started smoking about 31 years ago. She has a 1.3 pack-year smoking history. She has been exposed to tobacco smoke. She has never used smokeless tobacco..       Objective   Vital Signs:  /72 (BP Location: Right arm)   Ht 165.1 cm (65\")   Wt 59.7 kg (131 lb 11.2 oz)   BMI 21.92 kg/m²   Estimated body mass index is 21.92 kg/m² as calculated from the following:    Height as of this encounter: 165.1 cm (65\").    Weight as of " this encounter: 59.7 kg (131 lb 11.2 oz).     BMI is within normal parameters. No other follow-up for BMI required.      Physical Exam  Constitutional:       Appearance: Normal appearance.   HENT:      Head: Normocephalic.   Eyes:      Pupils: Pupils are equal, round, and reactive to light.   Cardiovascular:      Rate and Rhythm: Normal rate and regular rhythm.      Pulses:           Dorsalis pedis pulses are 2+ on the right side and 2+ on the left side.        Posterior tibial pulses are 2+ on the right side and 2+ on the left side.      Comments: Left leg: visible varicose veins medial thigh, medial calf.  Palpable long the course of the great saphenous vein without any tenderness, cord or warmth       Pulmonary:      Effort: Pulmonary effort is normal.      Breath sounds: Normal breath sounds.   Abdominal:      General: Abdomen is flat.      Palpations: Abdomen is soft.   Musculoskeletal:         General: Normal range of motion.      Right lower leg: No edema.      Left lower leg: No edema.   Skin:     Capillary Refill: Capillary refill takes less than 2 seconds.   Neurological:      Mental Status: She is alert.   Psychiatric:         Mood and Affect: Mood normal.         Behavior: Behavior normal.        Venous Left leg:Varicose Veins  CEAP Classification left leg: C3    Result Review :                     Assessment and Plan     Diagnoses and all orders for this visit:    1. Symptomatic varicose veins of left lower extremity (Primary)    2. Varicose veins of left lower extremity with pain  -     Venous w Reflux Lower Extremity - Unilateral CAR; Future    3. History of thrombophlebitis              We have discussed the natural history and pathophysiology of venous insufficiency. She was measured for graded compression stockings.  I have ordered vein mapping and reflux of the left lower extremity due to previous study is greater than a year old.  The study is to evaluate for superficial and deep system reflux.   Discussed with the patient that the physician will then discuss the results and the treatment options.  In the meantime, it she is to continue conservative management which consist of compression hoses 20 to 30 mmHg, leg elevation, exercise, and NSAIDs as needed for pain.   Follow Up     No follow-ups on file.  Patient was given instructions and counseling regarding her condition or for health maintenance advice. Please see specific information pulled into the AVS if appropriate.

## 2025-05-07 ENCOUNTER — OFFICE VISIT (OUTPATIENT)
Age: 60
End: 2025-05-07
Payer: OTHER GOVERNMENT

## 2025-05-07 VITALS
SYSTOLIC BLOOD PRESSURE: 130 MMHG | BODY MASS INDEX: 21.94 KG/M2 | HEIGHT: 65 IN | WEIGHT: 131.7 LBS | DIASTOLIC BLOOD PRESSURE: 72 MMHG

## 2025-05-07 DIAGNOSIS — I83.812 VARICOSE VEINS OF LEFT LOWER EXTREMITY WITH PAIN: ICD-10-CM

## 2025-05-07 DIAGNOSIS — Z86.72 HISTORY OF THROMBOPHLEBITIS: ICD-10-CM

## 2025-05-07 DIAGNOSIS — I83.892 SYMPTOMATIC VARICOSE VEINS OF LEFT LOWER EXTREMITY: Primary | ICD-10-CM

## 2025-05-13 PROCEDURE — 87086 URINE CULTURE/COLONY COUNT: CPT | Performed by: FAMILY MEDICINE

## 2025-05-15 ENCOUNTER — TELEPHONE (OUTPATIENT)
Dept: FAMILY MEDICINE CLINIC | Facility: CLINIC | Age: 60
End: 2025-05-15
Payer: OTHER GOVERNMENT

## 2025-05-15 DIAGNOSIS — Z12.4 CERVICAL CANCER SCREENING: Primary | ICD-10-CM

## 2025-06-12 ENCOUNTER — HOSPITAL ENCOUNTER (OUTPATIENT)
Dept: MAMMOGRAPHY | Facility: HOSPITAL | Age: 60
Discharge: HOME | End: 2025-06-12
Admitting: OBSTETRICS & GYNECOLOGY
Payer: OTHER GOVERNMENT

## 2025-06-12 DIAGNOSIS — Z01.419 WELL WOMAN EXAM WITH ROUTINE GYNECOLOGICAL EXAM: ICD-10-CM

## 2025-06-12 PROCEDURE — 77063 BREAST TOMOSYNTHESIS BI: CPT

## 2025-06-12 PROCEDURE — 77067 SCR MAMMO BI INCL CAD: CPT

## 2025-06-26 ENCOUNTER — OFFICE VISIT (OUTPATIENT)
Dept: FAMILY MEDICINE CLINIC | Facility: CLINIC | Age: 60
End: 2025-06-26
Payer: OTHER GOVERNMENT

## 2025-06-26 VITALS
OXYGEN SATURATION: 100 % | BODY MASS INDEX: 23.24 KG/M2 | SYSTOLIC BLOOD PRESSURE: 128 MMHG | HEIGHT: 65 IN | TEMPERATURE: 98 F | DIASTOLIC BLOOD PRESSURE: 72 MMHG | HEART RATE: 62 BPM | WEIGHT: 139.5 LBS

## 2025-06-26 DIAGNOSIS — R10.9 LEFT SIDED ABDOMINAL PAIN OF UNKNOWN CAUSE: ICD-10-CM

## 2025-06-26 DIAGNOSIS — Z23 NEED FOR PNEUMOCOCCAL VACCINE: ICD-10-CM

## 2025-06-26 DIAGNOSIS — Z00.00 ANNUAL PHYSICAL EXAM: Primary | ICD-10-CM

## 2025-06-26 NOTE — PROGRESS NOTES
"  ENCOUNTER DATE:  06/26/2025    Leola Graves / 59 y.o. / female      CHIEF COMPLAINT     Annual Exam and Pain (Left side comes and goes )      VITALS     Visit Vitals  /72   Pulse 62   Temp 98 °F (36.7 °C)   Ht 165.1 cm (65\")   Wt 63.3 kg (139 lb 8 oz)   LMP 08/07/2021 (Approximate)   SpO2 100%   BMI 23.21 kg/m²       BP Readings from Last 3 Encounters:   06/26/25 128/72   05/13/25 129/65   05/07/25 130/72     Wt Readings from Last 3 Encounters:   06/26/25 63.3 kg (139 lb 8 oz)   05/13/25 64 kg (141 lb)   05/07/25 59.7 kg (131 lb 11.2 oz)      Body mass index is 23.21 kg/m².    MEDICATIONS     Current Outpatient Medications on File Prior to Visit   Medication Sig Dispense Refill    acetaminophen (TYLENOL) 500 MG tablet Take 1 tablet by mouth Every 6 (Six) Hours As Needed for Mild Pain or Headache.      fluticasone (FLONASE) 50 MCG/ACT nasal spray Administer 2 sprays into the nostril(s) as directed by provider Daily. (Patient taking differently: Administer 2 sprays into the nostril(s) as directed by provider Daily. PRN)       No current facility-administered medications on file prior to visit.         HISTORY OF PRESENT ILLNESS     Leola presents for annual health maintenance visit.  Lab Results   Component Value Date    HPVAPTIMA Negative 11/01/2023      Last health maintenance visit: approximately 1 year ago  General health: good  Lifestyle:  Attempting to lose weight?: Yes   Diet: eats a well balanced, healthy diet  Exercise: exercises rarely  Tobacco: Former smoker   Alcohol: does not drink  Work: Full-time    Magi/Postmenopausal?: Yes   Domestic abuse concerns: No   Depression Screening:          PHQ-9 Depression Screening  Little interest or pleasure in doing things?     Feeling down, depressed, or hopeless?     PHQ-2 Total Score     Trouble falling or staying asleep, or sleeping too much?     Feeling tired or having little energy?     Poor appetite or overeating?     Feeling bad about yourself - or that " you are a failure or have let yourself or your family down?     Trouble concentrating on things, such as reading the newspaper or watching television?     Moving or speaking so slowly that other people could have noticed? Or the opposite - being so fidgety or restless that you have been moving around a lot more than usual?     Thoughts that you would be better off dead, or of hurting yourself in some way?     PHQ-9 Total Score     If you checked off any problems, how difficult have these problems made it for you to do your work, take care of things at home, or get along with other people?         LAN-7           Patient Care Team:  Elissa Miller APRN as PCP - General (Nurse Practitioner)  Karla Sheehan APRN (Inactive) as Nurse Practitioner (Otolaryngology)      ALLERGIES  Allergies   Allergen Reactions    Prometrium [Progesterone] Other (See Comments)     Severe breast tenderness w 100mg        PFSH:     The following portions of the patient's history were reviewed and updated as appropriate: Allergies / Current Medications / Past Medical History / Surgical History / Social History / Family History    PROBLEM LIST   Patient Active Problem List   Diagnosis    Arthritis    Facial aging    Gastrointestinal malfunction arising from mental factors    Headache    Lipoma    Menopausal symptoms    Neural foraminal stenosis of cervical spine    Right hip pain    Thyroid nodule    Varicose veins of left lower extremity with pain    Lipoma of right lower extremity    Closed displaced fracture of right patella    S/P Right Patella Open Reduction Internal Fixation    Aftercare following surgery of right patella ORIF, 8/20/2021    It band syndrome, right    Iron deficiency    Vitamin D insufficiency    Subserous leiomyoma of uterus    Right knee pain    Palpitations    Vaginal atrophy    Subacute vaginitis    Symptomatic varicose veins of left lower extremity    History of thrombophlebitis       PAST MEDICAL  HISTORY  Past Medical History:   Diagnosis Date    Arthritis     Cervical pain (neck) 2020    Fibroid     Headache     Leiomyoma     21 fibroid x2, 4.3cm subserosal and 3.8cm lat/post    Lipoma of right upper extremity 2021    hip/side    Neural foraminal stenosis of cervical spine 2020    Palpitations     SAW DR XIE 24 HAD HOLTER MONITOR (-). PT DENIES CP/SOA AND REPORTS IS ACTIVE    Right hip pain 2021    Right patella fracture CURRENTLY    HAD PROCEDURE AND THEN HARDWARE WAS REMOVED    Superficial thrombophlebitis     Varicose veins of lower extremity with pain, left 2020       SURGICAL HISTORY  Past Surgical History:   Procedure Laterality Date    EXCISION LESION N/A 12/10/2024    Procedure: MASS SOFT TISSUE EXCISION TORSO AND UPPER BACK;  Surgeon: Karissa Mcmullen MD;  [place] [facility] [place] [facility] [place]  Service: General;  Laterality: N/A;    GALLBLADDER SURGERY  2018    HARDWARE REMOVAL Right 2024    Procedure: right KNEE HARDWARE REMOVAL;  Surgeon: Scot Cuevas MD;  [place] [facility] [place] [facility] [place]  Service: Orthopedics;  Laterality: Right;    KNEE SURGERY Left     LIPOMA EXCISION      neck 10 yrs ago    MOLE REMOVAL      PATELLA OPEN REDUCTION INTERNAL FIXATION Right 2021    Procedure: PATELLA OPEN REDUCTION INTERNAL FIXATION;  Surgeon: Scot Cuevas MD;  [facility];  Service: Orthopedics;  Laterality: Right;       SOCIAL HISTORY  Social History     Socioeconomic History    Marital status:    Tobacco Use    Smoking status: Former     Current packs/day: 0.00     Average packs/day: 0.2 packs/day for 6.0 years (1.3 ttl pk-yrs)     Types: Cigarettes     Start date:      Quit date:      Years since quittin.5     Passive exposure: Past    Smokeless tobacco: Never    Tobacco comments:     smoked 5 years, quit 30 years ago   Vaping Use    Vaping status: Never Used   Substance and Sexual Activity    Alcohol use: Not Currently      Comment: occasionally drinks     Drug use: Never    Sexual activity: Defer       FAMILY HISTORY  Family History   Problem Relation Age of Onset    Alzheimer's disease Mother     Prostate cancer Father     Lung cancer Father     Breast cancer Maternal Aunt     Deep vein thrombosis Maternal Aunt     Ovarian cancer Neg Hx     Uterine cancer Neg Hx     Colon cancer Neg Hx     Pulmonary embolism Neg Hx     Malig Hyperthermia Neg Hx        IMMUNIZATION HISTORY  Immunization History   Administered Date(s) Administered    COVID-19 (PFIZER) BIVALENT 12+YRS 11/05/2022    COVID-19 (PFIZER) Purple Cap Monovalent 03/22/2021, 04/08/2021, 11/10/2021    Flu Vaccine Intradermal Quad 18-64YR 09/01/2020    Flu Vaccine Quad PF >36MO 09/21/2018, 09/26/2020, 09/20/2021    Fluzone  >6mos 10/08/2024    Fluzone (or Fluarix & Flulaval for VFC) >6mos 09/21/2018, 09/26/2020, 09/20/2021, 09/09/2022, 09/11/2023    Influenza, Unspecified 09/09/2022    PCV21 (CAPVAXIVE) 06/26/2025    Shingrix 01/22/2020, 01/22/2020, 03/27/2020, 03/27/2020    Tdap 01/18/2022         HPI    History of Present Illness  The patient presents for evaluation of left-sided abdominal pain.    She reports experiencing intermittent, severe cramping on the left side of her abdomen, which she describes as manageable. The pain is not associated with food intake and does not persist. She is not currently taking any medications that could potentially cause these symptoms. She also mentions a burning sensation in the same area, particularly noticeable at night or upon waking in the morning. She recalls an incident during a recent vacation where she experienced sudden onset of cramps, profuse sweating, and a feeling of impending faintness. This was accompanied by nausea, necessitating her to lie down and subsequently visit the bathroom due to diarrhea. These symptoms resolved within an hour. She occasionally experiences constipation but also reports instances of soft stools.  "She has not had any recent illnesses, fevers, or chills. She maintains a balanced diet and engages in regular exercise. She is not experiencing any abdominal pain today. A scan revealed constipation, but no other abnormalities were detected.    Her last colonoscopy was performed in 2017, with the next one scheduled in two years. The results indicated a floppy colon with numerous twists and turns, but no evidence of diverticulosis. She recalls an episode of passing jelly-like stool, which prompted a visit to an urgent care center. However, no intervention was deemed necessary at that time. Subsequent stool testing yielded normal results.    She has not received the pneumonia vaccine.         Physical Exam    REVIEWED DATA      Labs:    Lab Results   Component Value Date     01/28/2025    K 4.8 01/28/2025    CALCIUM 9.6 01/28/2025    AST 28 01/28/2025    ALT 35 (H) 01/28/2025    BUN 17 01/28/2025    CREATININE 0.76 01/28/2025    CREATININE 0.55 (L) 01/09/2025    CREATININE 0.71 08/01/2024    EGFRIFNONA 103 01/20/2022       Lab Results   Component Value Date    HGBA1C 5.30 11/04/2024    TSH 0.893 08/01/2024    FREET4 1.29 01/20/2022       Lab Results   Component Value Date     (H) 01/28/2025    HDL 66 (H) 01/28/2025    TRIG 68 01/28/2025    CHOLHDLRATIO 3.9 02/04/2021       Lab Results   Component Value Date    KPDG63WW 39.1 03/21/2024        Lab Results   Component Value Date    WBC 5.25 01/28/2025    HGB 13.7 01/28/2025    MCV 89.0 01/28/2025     01/28/2025       Lab Results   Component Value Date    GLUCOSEU Negative 08/01/2024    BLOODU Negative 08/01/2024    NITRITEU Negative 08/01/2024    LEUKOCYTESUR Negative 05/13/2025        No results found for: \"HEPCVIRUSABY\"        ASSESSMENT & PLAN     Diagnoses and all orders for this visit:    1. Annual physical exam (Primary)  -     CT Abdomen Pelvis With & Without Contrast; Future  -     Comprehensive Metabolic Panel  -     Lipid Panel    2. Need " for pneumococcal vaccine  -     Pneumococcal Conjugate Vaccine 21-Valent All    3. Left sided abdominal pain of unknown cause        Assessment & Plan  1. Left-sided abdominal pain.  - CT scan revealed no organ enlargement but indicated constipation.  - Reports intermittent cramping and burning sensations, particularly at night or in the morning.  - Referral to a gastroenterologist for further evaluation and potential repeat colonoscopy to rule out diverticulosis or diverticulitis.  - Repeat CT scan with IV contrast will be ordered to get a better look at the intestines and other abdominal structures.    2. Health maintenance.  - Due for baseline labs, including a cholesterol check.  - Will receive the pneumonia vaccine today to protect against certain strains of pneumonia.  - Advised to come in next week for fasting labs to recheck cholesterol levels.         HEALTHCARE MAINTENANCE ISSUES     Cancer Screening:  Colon: Initial/Next screening at age: CURRENT  Repeat colon cancer screening: every 10 years  Breast: Recommended monthly self exams; annual professional exam  Mammogram: every 1 year  Cervical: 3 years  Skin: Monthly self skin examination, annual exam by health professional      Lifestyle Counseling:  Lifestyle Modifications: Attempt to lose weight, Continue good lifestyle choices/modifications, and Improve dietary compliance  Safety Issues: Always wear seatbelt, Avoid texting while driving   Use sunscreen, regular skin examination  Recommended annual dental/vision examination.  Emotional/Stress/Sleep: Reviewed and  given when appropriate    Part of this note may be electronic transcription/translation of spoken language to printed text using the Dragon dictation system      Patient or patient representative verbalized consent for the use of Ambient Listening during the visit with  CHEYANNE West for chart documentation. 6/26/2025  12:11 EDT

## 2025-06-30 ENCOUNTER — LAB (OUTPATIENT)
Facility: HOSPITAL | Age: 60
End: 2025-06-30
Payer: OTHER GOVERNMENT

## 2025-06-30 LAB
ALBUMIN SERPL-MCNC: 4.1 G/DL (ref 3.5–5.2)
ALBUMIN/GLOB SERPL: 1.5 G/DL
ALP SERPL-CCNC: 68 U/L (ref 39–117)
ALT SERPL W P-5'-P-CCNC: 11 U/L (ref 1–33)
ANION GAP SERPL CALCULATED.3IONS-SCNC: 9 MMOL/L (ref 5–15)
AST SERPL-CCNC: 20 U/L (ref 1–32)
BILIRUB SERPL-MCNC: 0.5 MG/DL (ref 0–1.2)
BUN SERPL-MCNC: 13 MG/DL (ref 6–20)
BUN/CREAT SERPL: 20.6 (ref 7–25)
CALCIUM SPEC-SCNC: 9.3 MG/DL (ref 8.6–10.5)
CHLORIDE SERPL-SCNC: 105 MMOL/L (ref 98–107)
CHOLEST SERPL-MCNC: 195 MG/DL (ref 0–200)
CO2 SERPL-SCNC: 24 MMOL/L (ref 22–29)
CREAT SERPL-MCNC: 0.63 MG/DL (ref 0.57–1)
EGFRCR SERPLBLD CKD-EPI 2021: 102.3 ML/MIN/1.73
GLOBULIN UR ELPH-MCNC: 2.8 GM/DL
GLUCOSE SERPL-MCNC: 94 MG/DL (ref 65–99)
HDLC SERPL-MCNC: 55 MG/DL (ref 40–60)
LDLC SERPL CALC-MCNC: 128 MG/DL (ref 0–100)
LDLC/HDLC SERPL: 2.31 {RATIO}
POTASSIUM SERPL-SCNC: 4.5 MMOL/L (ref 3.5–5.2)
PROT SERPL-MCNC: 6.9 G/DL (ref 6–8.5)
SODIUM SERPL-SCNC: 138 MMOL/L (ref 136–145)
TRIGL SERPL-MCNC: 66 MG/DL (ref 0–150)
VLDLC SERPL-MCNC: 12 MG/DL (ref 5–40)

## 2025-06-30 PROCEDURE — 80061 LIPID PANEL: CPT | Performed by: NURSE PRACTITIONER

## 2025-06-30 PROCEDURE — 80053 COMPREHEN METABOLIC PANEL: CPT | Performed by: NURSE PRACTITIONER

## 2025-07-09 ENCOUNTER — HOSPITAL ENCOUNTER (OUTPATIENT)
Dept: CT IMAGING | Facility: HOSPITAL | Age: 60
Discharge: HOME | End: 2025-07-09
Admitting: NURSE PRACTITIONER
Payer: OTHER GOVERNMENT

## 2025-07-09 DIAGNOSIS — Z00.00 ANNUAL PHYSICAL EXAM: ICD-10-CM

## 2025-07-09 PROCEDURE — 25510000001 IOPAMIDOL PER 1 ML: Performed by: NURSE PRACTITIONER

## 2025-07-09 PROCEDURE — 74177 CT ABD & PELVIS W/CONTRAST: CPT

## 2025-07-09 RX ORDER — IOPAMIDOL 755 MG/ML
100 INJECTION, SOLUTION INTRAVASCULAR
Status: COMPLETED | OUTPATIENT
Start: 2025-07-09 | End: 2025-07-09

## 2025-07-09 RX ADMIN — IOPAMIDOL 100 ML: 755 INJECTION, SOLUTION INTRAVENOUS at 13:32

## 2025-07-11 DIAGNOSIS — D25.9 UTERINE LEIOMYOMA, UNSPECIFIED LOCATION: Primary | ICD-10-CM

## 2025-07-11 DIAGNOSIS — R10.9 LEFT SIDED ABDOMINAL PAIN OF UNKNOWN CAUSE: ICD-10-CM

## 2025-08-12 ENCOUNTER — OFFICE VISIT (OUTPATIENT)
Age: 60
End: 2025-08-12
Payer: OTHER GOVERNMENT

## 2025-08-12 ENCOUNTER — HOSPITAL ENCOUNTER (OUTPATIENT)
Facility: HOSPITAL | Age: 60
Discharge: HOME | End: 2025-08-12
Admitting: NURSE PRACTITIONER
Payer: OTHER GOVERNMENT

## 2025-08-12 VITALS
DIASTOLIC BLOOD PRESSURE: 80 MMHG | HEART RATE: 82 BPM | HEIGHT: 65 IN | RESPIRATION RATE: 17 BRPM | BODY MASS INDEX: 23.32 KG/M2 | WEIGHT: 140 LBS | SYSTOLIC BLOOD PRESSURE: 120 MMHG

## 2025-08-12 DIAGNOSIS — Z86.72 HISTORY OF THROMBOPHLEBITIS: ICD-10-CM

## 2025-08-12 DIAGNOSIS — I83.812 VARICOSE VEINS OF LEFT LOWER EXTREMITY WITH PAIN: Primary | ICD-10-CM

## 2025-08-12 DIAGNOSIS — I87.2 VENOUS (PERIPHERAL) INSUFFICIENCY: ICD-10-CM

## 2025-08-12 DIAGNOSIS — I83.812 VARICOSE VEINS OF LEFT LOWER EXTREMITY WITH PAIN: ICD-10-CM

## 2025-08-12 LAB
BH CV LEFT LOWER VAS COMMON FEMORAL REFLUX TIME: 1.23 SEC
BH CV LEFT LOWER VAS GSV KNEE TRANSVERSE DIAMETER: 0.3 CM
BH CV LEFT LOWER VAS GSV MID CALF REFLUX TIME: 5.87 SEC
BH CV LEFT LOWER VAS GSV MID CALF TRANS DIAMETER: 0.4 CM
BH CV LEFT LOWER VAS GSV MID TRANSVERSE DIAMETER: 0.5 CM
BH CV LEFT LOWER VAS GSV PROX REFLUX TIME: 2.65 SEC
BH CV LEFT LOWER VAS GSV PROX TRANSVERSE DIAMETER: 1.1 CM
BH CV LEFT LOWER VAS GSVBELOW KNEE TRANSVERSE DIAMETER: 0.2 CM
BH CV LEFT LOWER VAS SAPHENOFEMORAL JUNCTION REFLUX TIME: 3.68 SEC
BH CV LEFT LOWER VAS SAPHENOFEMORAL JUNCTION TRANSVERSE DIAMETER: 0.6 CM
BH CV LEFT LOWER VAS SSV MID CALF TRANS DIAMETER: 0.3 CM
BH CV LEFT LOWER VAS SSV PROX CALF TRANS DIAMETER: 0.3 CM
BH CV LEFT LOWER VAS VARICOSITY AK TRANS DIAMETER: 0.6 CM
BH CV LEFT LOWER VAS VARICOSITY BK REFLUX TIME: 6.29 SEC
BH CV LOW VAS LEFT COMMON FEM NOT VIS SCRIPTING: 1
BH CV LOW VAS LEFT GREATER SAPH AK VESSEL: 1
BH CV LOW VAS LEFT GSV MID CALF VESSEL: 1
BH CV LOW VAS LEFT GSV MID THIGH VESSEL: 1
BH CV LOW VAS LEFT SAPHENOFEM VESSEL: 1
BH CV LOW VAS LEFT VARICOSITY AK VESSEL: 1
BH CV LOW VAS LEFT VARICOSITY BK VESSEL: 1
BH CV LOWER VAS LEFT GSV DIST THIGH COMPRESSIBILTY: NORMAL
BH CV LOWER VAS LEFT GSV MID CALF COMPRESSIBILTY: NORMAL
BH CV LOWER VAS LEFT GSV MID THIGH COMPRESSIBILTY: NORMAL
BH CV LOWER VASCULAR LEFT COMMON FEMORAL COMPETENT: NORMAL
BH CV LOWER VASCULAR LEFT COMMON FEMORAL COMPRESS: NORMAL
BH CV LOWER VASCULAR LEFT DISTAL FEMORAL COMPRESS: NORMAL
BH CV LOWER VASCULAR LEFT GASTRONEMIUS COMPRESS: NORMAL
BH CV LOWER VASCULAR LEFT GREATER SAPH AK COMPETENT: NORMAL
BH CV LOWER VASCULAR LEFT GREATER SAPH AK COMPRESS: NORMAL
BH CV LOWER VASCULAR LEFT GREATER SAPH BK COMPRESS: NORMAL
BH CV LOWER VASCULAR LEFT GSV DIST THIGH COMPETENT: NORMAL
BH CV LOWER VASCULAR LEFT GSV MID CALF COMPETENT: NORMAL
BH CV LOWER VASCULAR LEFT GSV MID THIGH THROMBUS: NORMAL
BH CV LOWER VASCULAR LEFT LESSER SAPH COMPETENT: NORMAL
BH CV LOWER VASCULAR LEFT LESSER SAPH COMPRESS: NORMAL
BH CV LOWER VASCULAR LEFT MID FEMORAL AUGMENT: NORMAL
BH CV LOWER VASCULAR LEFT MID FEMORAL COMPETENT: NORMAL
BH CV LOWER VASCULAR LEFT MID FEMORAL COMPRESS: NORMAL
BH CV LOWER VASCULAR LEFT MID FEMORAL PHASIC: NORMAL
BH CV LOWER VASCULAR LEFT MID FEMORAL SPONT: NORMAL
BH CV LOWER VASCULAR LEFT PERONEAL COMPRESS: NORMAL
BH CV LOWER VASCULAR LEFT POPLITEAL AUGMENT: NORMAL
BH CV LOWER VASCULAR LEFT POPLITEAL COMPETENT: NORMAL
BH CV LOWER VASCULAR LEFT POPLITEAL COMPRESS: NORMAL
BH CV LOWER VASCULAR LEFT POPLITEAL PHASIC: NORMAL
BH CV LOWER VASCULAR LEFT POPLITEAL SPONT: NORMAL
BH CV LOWER VASCULAR LEFT POSTERIOR TIBIAL COMPRESS: NORMAL
BH CV LOWER VASCULAR LEFT PROFUNDA FEMORAL COMPRESS: NORMAL
BH CV LOWER VASCULAR LEFT PROXIMAL FEMORAL COMPRESS: NORMAL
BH CV LOWER VASCULAR LEFT SAPHENOFEMORAL JUNCTION AUGMENT: NORMAL
BH CV LOWER VASCULAR LEFT SAPHENOFEMORAL JUNCTION COMPETENT: NORMAL
BH CV LOWER VASCULAR LEFT SAPHENOFEMORAL JUNCTION COMPRESS: NORMAL
BH CV LOWER VASCULAR LEFT SAPHENOFEMORAL JUNCTION PHASIC: NORMAL
BH CV LOWER VASCULAR LEFT SAPHENOFEMORAL JUNCTION SPONT: NORMAL
BH CV LOWER VASCULAR LEFT SSV MID CALF COMPRESS: NORMAL
BH CV LOWER VASCULAR LEFT VARICOSITY AK COMPRESS: NORMAL
BH CV LOWER VASCULAR LEFT VARICOSITY AK THROMBUS: NORMAL
BH CV LOWER VASCULAR LEFT VARICOSITY BK COMPETENT: NORMAL
BH CV LOWER VASCULAR LEFT VARICOSITY BK COMPRESS: NORMAL
BH CV LOWER VASCULAR LEFT VARICOSITY BK THROMBUS: NORMAL
BH CV LOWER VASCULAR RIGHT COMMON FEMORAL AUGMENT: NORMAL
BH CV LOWER VASCULAR RIGHT COMMON FEMORAL COMPETENT: NORMAL
BH CV LOWER VASCULAR RIGHT COMMON FEMORAL COMPRESS: NORMAL
BH CV LOWER VASCULAR RIGHT COMMON FEMORAL PHASIC: NORMAL
BH CV LOWER VASCULAR RIGHT COMMON FEMORAL SPONT: NORMAL

## 2025-08-12 PROCEDURE — 93971 EXTREMITY STUDY: CPT

## 2025-08-12 RX ORDER — LORAZEPAM 1 MG/1
1 TABLET ORAL 2 TIMES DAILY
Qty: 2 TABLET | Refills: 0 | Status: SHIPPED | OUTPATIENT
Start: 2025-08-12

## 2025-08-18 ENCOUNTER — OFFICE VISIT (OUTPATIENT)
Age: 60
End: 2025-08-18
Payer: OTHER GOVERNMENT

## 2025-08-18 ENCOUNTER — HOSPITAL ENCOUNTER (OUTPATIENT)
Facility: HOSPITAL | Age: 60
Discharge: HOME | End: 2025-08-18
Admitting: SURGERY
Payer: OTHER GOVERNMENT

## 2025-08-18 VITALS
DIASTOLIC BLOOD PRESSURE: 64 MMHG | WEIGHT: 140 LBS | BODY MASS INDEX: 23.32 KG/M2 | SYSTOLIC BLOOD PRESSURE: 108 MMHG | HEIGHT: 65 IN

## 2025-08-18 DIAGNOSIS — I87.2 VENOUS (PERIPHERAL) INSUFFICIENCY: ICD-10-CM

## 2025-08-18 DIAGNOSIS — Z86.72 HISTORY OF THROMBOPHLEBITIS: ICD-10-CM

## 2025-08-18 DIAGNOSIS — I83.812 VARICOSE VEINS OF LEFT LOWER EXTREMITY WITH PAIN: ICD-10-CM

## 2025-08-18 DIAGNOSIS — I83.812 VARICOSE VEINS OF LEFT LOWER EXTREMITY WITH PAIN: Primary | ICD-10-CM

## 2025-08-18 LAB
BH CV LOW VAS LEFT EXTERNAL ILIAC COMPRESS: NORMAL
BH CV LOW VAS LEFT GREAT SAPH AK CM FIELD: 1.32 CM
BH CV LOW VAS LEFT GREATER SAPH AK VESSEL: 1
BH CV LOW VAS LEFT GREATER SAPH BK VESSEL: 1
BH CV LOW VAS LEFT VARICOSITY BK VESSEL: 1
BH CV LOWER VASCULAR LEFT COMMON FEMORAL COMPRESS: NORMAL
BH CV LOWER VASCULAR LEFT DISTAL FEMORAL COMPRESS: NORMAL
BH CV LOWER VASCULAR LEFT GASTRONEMIUS COMPRESS: NORMAL
BH CV LOWER VASCULAR LEFT GREATER SAPH AK COMPRESS: NORMAL
BH CV LOWER VASCULAR LEFT GREATER SAPH BK COMPRESS: NORMAL
BH CV LOWER VASCULAR LEFT LESSER SAPH COMPRESS: NORMAL
BH CV LOWER VASCULAR LEFT MID FEMORAL COMPRESS: NORMAL
BH CV LOWER VASCULAR LEFT POPLITEAL COMPRESS: NORMAL
BH CV LOWER VASCULAR LEFT PROFUNDA FEMORAL COMPRESS: NORMAL
BH CV LOWER VASCULAR LEFT PROXIMAL FEMORAL COMPRESS: NORMAL
BH CV LOWER VASCULAR LEFT SAPHENOFEMORAL JUNCTION COMPRESS: NORMAL
BH CV LOWER VASCULAR LEFT VARICOSITY BK COMPRESS: NORMAL
BH CV LOWER VASCULAR LEFT VARICOSITY BK THROMBUS: NORMAL

## 2025-08-18 PROCEDURE — 93971 EXTREMITY STUDY: CPT | Performed by: SURGERY

## 2025-08-18 PROCEDURE — 99213 OFFICE O/P EST LOW 20 MIN: CPT | Performed by: NURSE PRACTITIONER

## 2025-08-18 PROCEDURE — 93971 EXTREMITY STUDY: CPT

## 2025-08-21 ENCOUNTER — TELEPHONE (OUTPATIENT)
Age: 60
End: 2025-08-21
Payer: OTHER GOVERNMENT

## (undated) DEVICE — PENCL ES MEGADINE EZ/CLEAN BUTN W/HOLSTR 10FT

## (undated) DEVICE — EXTREMITY-LF: Brand: MEDLINE INDUSTRIES, INC.

## (undated) DEVICE — SUT ETHLN 3-0 FS118IN 663H

## (undated) DEVICE — APPL CHLORAPREP HI/LITE 26ML ORNG

## (undated) DEVICE — ADHS LIQ MASTISOL 2/3ML

## (undated) DEVICE — BNDG ELAS ECON W/CLIP 4IN 5YD LF STRL

## (undated) DEVICE — SUT MNCRYL PLS ANTIB UD 3/0 PS2 27IN

## (undated) DEVICE — DISPOSABLE TOURNIQUET CUFF SINGLE BLADDER, SINGLE PORT AND QUICK CONNECT CONNECTOR: Brand: COLOR CUFF

## (undated) DEVICE — PENCL SMOKE/EVAC MEGADYNE TELESCP 10FT

## (undated) DEVICE — STRIP,CLOSURE,WOUND,MEDI-STRIP,1/2X4: Brand: MEDLINE

## (undated) DEVICE — SLV SCD LEG COMFORT KENDALLSCD MD REPROC

## (undated) DEVICE — DRAPE,TOWEL,LARGE,INVISISHIELD: Brand: MEDLINE

## (undated) DEVICE — UNDYED BRAIDED (POLYGLACTIN 910), SYNTHETIC ABSORBABLE SUTURE: Brand: COATED VICRYL

## (undated) DEVICE — GAUZE,SPONGE,4"X4",16PLY,STRL,LF,10/TRAY: Brand: MEDLINE

## (undated) DEVICE — DRSNG GZ PETROLTM XEROFORM CURAD 1X8IN STRL

## (undated) DEVICE — INTENDED FOR TISSUE SEPARATION, AND OTHER PROCEDURES THAT REQUIRE A SHARP SURGICAL BLADE TO PUNCTURE OR CUT.: Brand: BARD-PARKER ® CARBON RIB-BACK BLADES

## (undated) DEVICE — 3M™ STERI-DRAPE™ X-RAY IMAGE INTENSIFIER DRAPE, 10 PER CARTON / 4 CARTONS PER CASE, 1013: Brand: STERI-DRAPE™

## (undated) DEVICE — BNDG ELAS CO-FLEX SLF ADHR 6IN 5YD LF STRL

## (undated) DEVICE — STERILE POLYISOPRENE POWDER-FREE SURGICAL GLOVES: Brand: PROTEXIS

## (undated) DEVICE — Device

## (undated) DEVICE — GOWN,REINFRCE,POLY,SIRUS,BREATH SLV,XXLG: Brand: MEDLINE

## (undated) DEVICE — STERILE POLYISOPRENE POWDER-FREE SURGICAL GLOVES WITH EMOLLIENT COATING: Brand: PROTEXIS

## (undated) DEVICE — GLV SURG SENSICARE PI ORTHO SZ8 LF STRL

## (undated) DEVICE — DRSNG SURG AQUACEL AG 9X25CM

## (undated) DEVICE — PROXIMATE RH ROTATING HEAD SKIN STAPLERS (35 WIDE) CONTAINS 35 STAINLESS STEEL STAPLES: Brand: PROXIMATE

## (undated) DEVICE — SUT VIC 3/0 SH 27IN J416H

## (undated) DEVICE — BNDG ESMARK 4IN 12FT LF STRL BLU

## (undated) DEVICE — PAD GRND REM POLYHESIVE A/ DISP

## (undated) DEVICE — KNEE IMMOBILIZER: Brand: DEROYAL

## (undated) DEVICE — TRY FOL URINE METER STATLOCK 16F 5CC

## (undated) DEVICE — COVER,C-ARM,41X74: Brand: MEDLINE

## (undated) DEVICE — BNDG COTN/ELAS FLEXMASTER DBL/LENGTH CLIP/CLS 6IN 11YD

## (undated) DEVICE — BLD SHVE RESEC SABRE COOLCUT SJ 2MM 7CM

## (undated) DEVICE — ANTIBACTERIAL UNDYED BRAIDED (POLYGLACTIN 910), SYNTHETIC ABSORBABLE SUTURE: Brand: COATED VICRYL

## (undated) DEVICE — CAST PADDING 6 IN KIT: Brand: CARDINAL HEALTH

## (undated) DEVICE — GLV SURG SENSICARE SLT PF LF 7.5 STRL

## (undated) DEVICE — DRSNG SURG AQUACEL AG/ADVNTGE 9X25CM 3.5X10IN

## (undated) DEVICE — MAJOR-LF: Brand: MEDLINE INDUSTRIES, INC.

## (undated) DEVICE — UNDERCAST PADDING: Brand: DEROYAL

## (undated) DEVICE — KENDALL SCD EXPRESS SLEEVES, KNEE LENGTH, LARGE: Brand: KENDALL SCD

## (undated) DEVICE — GLV SURG SENSICARE PI LF PF 7.5 GRN STRL

## (undated) DEVICE — GOWN,REINFORCE,POLY,SIRUS,BREATH SLV,XLG: Brand: MEDLINE

## (undated) DEVICE — HYPODERMIC SAFETY NEEDLE: Brand: MONOJECT

## (undated) DEVICE — GW THRD 1.25X150MM FOR 3.5 4MM CANN SCRW

## (undated) DEVICE — IMMOB KN 3PNL DLX CANVS 24IN BLU

## (undated) DEVICE — 3M™ STERI-DRAPE™ U-DRAPE 1015: Brand: STERI-DRAPE™

## (undated) DEVICE — DRESSING,GAUZE,XEROFORM,CURAD,1"X8",ST: Brand: CURAD

## (undated) DEVICE — GLV SURG SENSICARE ORTHO PF LF 7 STRL

## (undated) DEVICE — DRSNG PAD ABD 8X10IN STRL

## (undated) DEVICE — SUT VIC 2/0 CT1 36IN

## (undated) DEVICE — KENDALL SCD EXPRESS SLEEVES, KNEE LENGTH, MEDIUM: Brand: KENDALL SCD

## (undated) DEVICE — SOL IRR NACL 0.9PCT 500ML

## (undated) DEVICE — ESMARK: Brand: DEROYAL

## (undated) DEVICE — ANTIBACTERIAL VIOLET BRAIDED (POLYGLACTIN 910), SYNTHETIC ABSORBABLE SUTURE: Brand: COATED VICRYL